# Patient Record
Sex: FEMALE | Race: BLACK OR AFRICAN AMERICAN | Employment: FULL TIME | URBAN - METROPOLITAN AREA
[De-identification: names, ages, dates, MRNs, and addresses within clinical notes are randomized per-mention and may not be internally consistent; named-entity substitution may affect disease eponyms.]

---

## 2017-03-21 ENCOUNTER — OFFICE VISIT (OUTPATIENT)
Dept: FAMILY MEDICINE CLINIC | Age: 22
End: 2017-03-21
Payer: MEDICARE

## 2017-03-21 VITALS
HEART RATE: 69 BPM | TEMPERATURE: 97.4 F | RESPIRATION RATE: 20 BRPM | HEIGHT: 62 IN | WEIGHT: 177.25 LBS | BODY MASS INDEX: 32.62 KG/M2

## 2017-03-21 DIAGNOSIS — E66.9 NON MORBID OBESITY, UNSPECIFIED OBESITY TYPE: ICD-10-CM

## 2017-03-21 DIAGNOSIS — F41.9 ANXIETY: ICD-10-CM

## 2017-03-21 DIAGNOSIS — G40.909 SEIZURE DISORDER (HCC): Primary | ICD-10-CM

## 2017-03-21 PROCEDURE — 99213 OFFICE O/P EST LOW 20 MIN: CPT | Performed by: HOSPITALIST

## 2017-03-21 RX ORDER — SERTRALINE HYDROCHLORIDE 25 MG/1
25 TABLET, FILM COATED ORAL DAILY
Qty: 30 TABLET | Refills: 3 | Status: SHIPPED | OUTPATIENT
Start: 2017-03-21 | End: 2017-05-22 | Stop reason: SDUPTHER

## 2017-03-21 RX ORDER — LEVETIRACETAM 500 MG/1
500 TABLET ORAL 2 TIMES DAILY
Qty: 60 TABLET | Refills: 2 | Status: SHIPPED | OUTPATIENT
Start: 2017-03-21 | End: 2017-05-22 | Stop reason: SDUPTHER

## 2017-03-21 ASSESSMENT — ENCOUNTER SYMPTOMS
APNEA: 0
ABDOMINAL DISTENTION: 0
CHEST TIGHTNESS: 0

## 2017-05-22 ENCOUNTER — OFFICE VISIT (OUTPATIENT)
Dept: FAMILY MEDICINE CLINIC | Age: 22
End: 2017-05-22
Payer: MEDICARE

## 2017-05-22 VITALS
TEMPERATURE: 97.1 F | SYSTOLIC BLOOD PRESSURE: 116 MMHG | WEIGHT: 183 LBS | BODY MASS INDEX: 33.68 KG/M2 | HEART RATE: 77 BPM | DIASTOLIC BLOOD PRESSURE: 66 MMHG | HEIGHT: 62 IN

## 2017-05-22 DIAGNOSIS — F41.9 ANXIETY: ICD-10-CM

## 2017-05-22 DIAGNOSIS — G40.909 SEIZURE DISORDER (HCC): ICD-10-CM

## 2017-05-22 DIAGNOSIS — Z00.00 HEALTH CARE MAINTENANCE: Primary | ICD-10-CM

## 2017-05-22 DIAGNOSIS — B37.31 VAGINAL CANDIDIASIS: ICD-10-CM

## 2017-05-22 PROCEDURE — 99213 OFFICE O/P EST LOW 20 MIN: CPT | Performed by: FAMILY MEDICINE

## 2017-05-22 RX ORDER — SERTRALINE HYDROCHLORIDE 25 MG/1
25 TABLET, FILM COATED ORAL DAILY
Qty: 30 TABLET | Refills: 3 | Status: SHIPPED | OUTPATIENT
Start: 2017-05-22 | End: 2018-03-28 | Stop reason: SDUPTHER

## 2017-05-22 RX ORDER — FLUCONAZOLE 150 MG/1
150 TABLET ORAL DAILY
Qty: 2 TABLET | Refills: 0 | Status: SHIPPED | OUTPATIENT
Start: 2017-05-22 | End: 2017-10-02

## 2017-05-22 RX ORDER — LEVETIRACETAM 500 MG/1
500 TABLET ORAL 2 TIMES DAILY
Qty: 60 TABLET | Refills: 2 | Status: SHIPPED | OUTPATIENT
Start: 2017-05-22 | End: 2017-10-02 | Stop reason: SDUPTHER

## 2017-05-22 ASSESSMENT — ENCOUNTER SYMPTOMS
SHORTNESS OF BREATH: 0
GASTROINTESTINAL NEGATIVE: 1
COUGH: 0

## 2017-08-10 ENCOUNTER — OFFICE VISIT (OUTPATIENT)
Dept: FAMILY MEDICINE CLINIC | Age: 22
End: 2017-08-10
Payer: MEDICARE

## 2017-08-10 VITALS
HEART RATE: 66 BPM | DIASTOLIC BLOOD PRESSURE: 79 MMHG | WEIGHT: 180.2 LBS | HEIGHT: 62 IN | TEMPERATURE: 97.1 F | BODY MASS INDEX: 33.16 KG/M2 | SYSTOLIC BLOOD PRESSURE: 114 MMHG

## 2017-08-10 DIAGNOSIS — H92.02 OTALGIA, LEFT: Primary | ICD-10-CM

## 2017-08-10 PROBLEM — H92.09 OTALGIA: Status: ACTIVE | Noted: 2017-08-10

## 2017-08-10 PROCEDURE — 99213 OFFICE O/P EST LOW 20 MIN: CPT | Performed by: FAMILY MEDICINE

## 2017-08-10 RX ORDER — NEOMYCIN SULFATE, POLYMYXIN B SULFATE AND HYDROCORTISONE 10; 3.5; 1 MG/ML; MG/ML; [USP'U]/ML
SUSPENSION/ DROPS AURICULAR (OTIC)
COMMUNITY
Start: 2017-08-08 | End: 2017-08-18

## 2017-08-10 RX ORDER — AMOXICILLIN AND CLAVULANATE POTASSIUM 875; 125 MG/1; MG/1
1 TABLET, FILM COATED ORAL 2 TIMES DAILY
Qty: 20 TABLET | Refills: 0 | Status: SHIPPED | OUTPATIENT
Start: 2017-08-10 | End: 2017-08-20

## 2017-08-10 ASSESSMENT — ENCOUNTER SYMPTOMS
SINUS PRESSURE: 0
SHORTNESS OF BREATH: 0
CHOKING: 0
COUGH: 0
RHINORRHEA: 0
GASTROINTESTINAL NEGATIVE: 1
EYES NEGATIVE: 1

## 2017-08-17 ENCOUNTER — HOSPITAL ENCOUNTER (OUTPATIENT)
Age: 22
Setting detail: SPECIMEN
Discharge: HOME | End: 2017-08-17
Payer: MEDICARE

## 2017-08-17 ENCOUNTER — OFFICE VISIT (OUTPATIENT)
Dept: FAMILY MEDICINE CLINIC | Age: 22
End: 2017-08-17
Payer: MEDICARE

## 2017-08-17 VITALS
HEART RATE: 76 BPM | BODY MASS INDEX: 33.31 KG/M2 | TEMPERATURE: 98.1 F | WEIGHT: 181 LBS | DIASTOLIC BLOOD PRESSURE: 77 MMHG | HEIGHT: 62 IN | SYSTOLIC BLOOD PRESSURE: 109 MMHG

## 2017-08-17 DIAGNOSIS — Z00.00 HEALTH CARE MAINTENANCE: ICD-10-CM

## 2017-08-17 DIAGNOSIS — H66.002 ACUTE SUPPURATIVE OTITIS MEDIA OF LEFT EAR WITHOUT SPONTANEOUS RUPTURE OF TYMPANIC MEMBRANE, RECURRENCE NOT SPECIFIED: Primary | ICD-10-CM

## 2017-08-17 PROCEDURE — 99213 OFFICE O/P EST LOW 20 MIN: CPT | Performed by: FAMILY MEDICINE

## 2017-08-17 PROCEDURE — 90715 TDAP VACCINE 7 YRS/> IM: CPT | Performed by: FAMILY MEDICINE

## 2017-08-17 PROCEDURE — 90471 IMMUNIZATION ADMIN: CPT | Performed by: FAMILY MEDICINE

## 2017-08-17 ASSESSMENT — PATIENT HEALTH QUESTIONNAIRE - PHQ9
SUM OF ALL RESPONSES TO PHQ9 QUESTIONS 1 & 2: 0
SUM OF ALL RESPONSES TO PHQ QUESTIONS 1-9: 0
2. FEELING DOWN, DEPRESSED OR HOPELESS: 0
1. LITTLE INTEREST OR PLEASURE IN DOING THINGS: 0

## 2017-08-17 ASSESSMENT — ENCOUNTER SYMPTOMS
GASTROINTESTINAL NEGATIVE: 1
COUGH: 0
SHORTNESS OF BREATH: 0
WHEEZING: 0

## 2017-08-18 LAB — C. TRACHOMATIS DNA ,URINE: NEGATIVE

## 2017-10-02 ENCOUNTER — OFFICE VISIT (OUTPATIENT)
Dept: FAMILY MEDICINE CLINIC | Age: 22
End: 2017-10-02
Payer: MEDICARE

## 2017-10-02 VITALS
DIASTOLIC BLOOD PRESSURE: 72 MMHG | BODY MASS INDEX: 32.57 KG/M2 | WEIGHT: 177 LBS | TEMPERATURE: 98.4 F | HEIGHT: 62 IN | HEART RATE: 83 BPM | SYSTOLIC BLOOD PRESSURE: 115 MMHG

## 2017-10-02 DIAGNOSIS — G40.909 SEIZURE DISORDER (HCC): Primary | ICD-10-CM

## 2017-10-02 DIAGNOSIS — Z00.00 HEALTH CARE MAINTENANCE: ICD-10-CM

## 2017-10-02 PROCEDURE — 99213 OFFICE O/P EST LOW 20 MIN: CPT | Performed by: FAMILY MEDICINE

## 2017-10-02 RX ORDER — LEVETIRACETAM 500 MG/1
500 TABLET ORAL 2 TIMES DAILY
Qty: 60 TABLET | Refills: 2 | Status: SHIPPED | OUTPATIENT
Start: 2017-10-02 | End: 2017-12-14 | Stop reason: SDUPTHER

## 2017-10-02 ASSESSMENT — ENCOUNTER SYMPTOMS
CHEST TIGHTNESS: 0
APNEA: 0
CHOKING: 0
COUGH: 0

## 2017-10-02 NOTE — MR AVS SNAPSHOT
cancers. BMI is not perfect. It may overestimate body fat in athletes and people who are more muscular. Even a small weight loss (between 5 and 10 percent of your current weight) by decreasing your calorie intake and becoming more physically active will help lower your risk of developing or worsening diseases associated with obesity. Learn more at: [employer]          Instructions    Thank you for letting us take care of you today. We hope all your questions were addressed. If a question was overlooked or something else comes to mind after you return home, please contact a member of your Care Team listed below. Please make sure you have a routine office visit set up to follow-up on [address]. Your Care Team at [place] [address] is Team #2  Erna Light DO (Faculty)  Mike Haque MD (Resindent)  Sonny Shields MD (Resident)  Mirian Pradhan MD (Resident)  Nathan Chowdhury MD (Resident)  Harley Granda MD (Resident)  Chas Morse TERRELL  [facility] DAVID Escobedo, DAVID Matthews ([facility])  Alan Henriquez RN, ([address] [facility])  Kale Smith, Ph.D., (Behavioral Services)  Kofi Prince, [address] [place] [address] (Clinical Pharmacist)     Office phone number: [phone]    If you need to get in right away due to illness, please be advised we have \"Same Day\" appointments available Monday-Friday. Please call us at [phone] option #1 to schedule your \"Same Day\" appointment. Today's Medication Changes          These changes are accurate as of: 10/2/17  3:06 PM.  If you have any questions, ask your nurse or doctor.                STOP taking these medications           fluconazole 150 MG tablet   Commonly known as:  DIFLUCAN   Stopped by:  Goldy Stapleton MD            Where to Get Your Medications      These medications were sent to [address] [place] [address] - [address] [facility], [address] [place] [address]     Phone:  [phone]     levETIRAcetam 500 MG tablet               Your Current Medications Are              levETIRAcetam (KEPPRA) 500 MG tablet Take 1 tablet by mouth 2 times daily    sertraline (ZOLOFT) 25 MG tablet Take 1 tablet by mouth daily    folic acid (V-R FOLIC ACID) 511 MCG tablet Take 1 tablet by mouth daily    Prenatal Vit-Fe Fumarate-FA (PRENATAL VITAMINS PLUS) 27-1 MG TABS tablet Take 1 tablet by mouth daily      Allergies           No Known Allergies         Additional Information        Basic Information     Date Of Birth Sex Race Ethnicity Preferred Language    1995 Female Black Non-/Non  English      Problem List as of 10/2/2017  Date Reviewed: 3/21/2017                Acute suppurative otitis media of left ear without spontaneous rupture of tympanic membrane    Health care maintenance    Otalgia    Encounter to establish care    Seizure disorder ([facility] [address]    Trying to get pregnant      Immunizations as of 10/2/2017     Name Date    Tdap (Boostrix, Adacel) 8/17/2017      Preventive Care        Date Due    HIV screening is recommended for all people regardless of risk factors  aged 15-65 years at least once (lifetime) who have never been HIV tested. 2/11/2010    Pap Smear 2/11/2016    Yearly Flu Vaccine (1) 10/16/2017 (Originally 9/1/2017)    Tetanus Combination Vaccine (2 - Td) 8/17/2027            MyChart Signup           Our records indicate that you have declined MyChart signup.

## 2017-12-14 ENCOUNTER — HOSPITAL ENCOUNTER (OUTPATIENT)
Age: 22
Discharge: HOME | End: 2017-12-14
Payer: MEDICARE

## 2017-12-14 ENCOUNTER — OFFICE VISIT (OUTPATIENT)
Dept: FAMILY MEDICINE CLINIC | Age: 22
End: 2017-12-14
Payer: MEDICARE

## 2017-12-14 ENCOUNTER — HOSPITAL ENCOUNTER (OUTPATIENT)
Dept: GENERAL RADIOLOGY | Age: 22
Discharge: HOME | End: 2017-12-14
Payer: MEDICARE

## 2017-12-14 VITALS
BODY MASS INDEX: 31.98 KG/M2 | DIASTOLIC BLOOD PRESSURE: 64 MMHG | TEMPERATURE: 97.6 F | WEIGHT: 173.8 LBS | SYSTOLIC BLOOD PRESSURE: 102 MMHG | HEIGHT: 62 IN | HEART RATE: 78 BPM

## 2017-12-14 DIAGNOSIS — Z00.00 HEALTH CARE MAINTENANCE: ICD-10-CM

## 2017-12-14 DIAGNOSIS — M25.571 ACUTE RIGHT ANKLE PAIN: ICD-10-CM

## 2017-12-14 DIAGNOSIS — M25.571 ACUTE RIGHT ANKLE PAIN: Primary | ICD-10-CM

## 2017-12-14 PROCEDURE — G8417 CALC BMI ABV UP PARAM F/U: HCPCS | Performed by: FAMILY MEDICINE

## 2017-12-14 PROCEDURE — 99213 OFFICE O/P EST LOW 20 MIN: CPT | Performed by: FAMILY MEDICINE

## 2017-12-14 PROCEDURE — 73600 X-RAY EXAM OF ANKLE: CPT

## 2017-12-14 PROCEDURE — G8427 DOCREV CUR MEDS BY ELIG CLIN: HCPCS | Performed by: FAMILY MEDICINE

## 2017-12-14 PROCEDURE — 1036F TOBACCO NON-USER: CPT | Performed by: FAMILY MEDICINE

## 2017-12-14 PROCEDURE — G8484 FLU IMMUNIZE NO ADMIN: HCPCS | Performed by: FAMILY MEDICINE

## 2017-12-14 RX ORDER — IBUPROFEN 800 MG/1
800 TABLET ORAL EVERY 6 HOURS PRN
Qty: 120 TABLET | Refills: 0 | Status: SHIPPED | OUTPATIENT
Start: 2017-12-14 | End: 2018-10-23

## 2017-12-14 RX ORDER — LEVETIRACETAM 500 MG/1
500 TABLET ORAL 2 TIMES DAILY
Qty: 60 TABLET | Refills: 2 | Status: SHIPPED | OUTPATIENT
Start: 2017-12-14 | End: 2018-03-28 | Stop reason: SDUPTHER

## 2017-12-14 ASSESSMENT — ENCOUNTER SYMPTOMS
GASTROINTESTINAL NEGATIVE: 1
SHORTNESS OF BREATH: 0
COUGH: 0

## 2017-12-14 NOTE — PROGRESS NOTES
Subjective:      Patient ID: Marie Suazo is a 25 y.o. female. HPI  Patient presents today with complain of right ankle pain   She fell 3 weeks ago down the stairs and hurt her right ankle  Reports swelling right after the injury  Difficult to ambulate  Is not taking anything for the pain  Patient states she put ice on it but swelling continued   Denies smoking or any other complains    Review of Systems   Constitutional: Negative for fatigue and fever. Respiratory: Negative for cough and shortness of breath. Cardiovascular: Negative. Gastrointestinal: Negative. Endocrine: Negative. Musculoskeletal: Positive for arthralgias. Objective:   Physical Exam   Constitutional: She appears well-developed. No distress. Cardiovascular: Normal rate and regular rhythm. Exam reveals no friction rub. No murmur heard. Pulmonary/Chest: Effort normal and breath sounds normal. No respiratory distress. She has no wheezes. Abdominal: Soft. Bowel sounds are normal.   Musculoskeletal:   Swelling and tenderness around the right lateral malleolus   Skin: She is not diaphoretic. Nursing note and vitals reviewed. Assessment and plan:       1. Acute right ankle pain  - XR ANKLE RIGHT (2 VIEWS); Future  - ibuprofen (ADVIL;MOTRIN) 800 MG tablet; Take 1 tablet by mouth every 6 hours as needed for Pain  Dispense: 120 tablet; Refill: 0    2. Health care maintenance  - HIV-1 And HIV-2 Antibodies; Future          Livia received counseling on the following healthy behaviors: nutrition, exercise and medication adherence    Patient given educational materials : see patient instruction        Discussed use, benefit, and side effects of prescribed medications. Barriers to medication compliance addressed. All patient questions answered. Pt voiced understanding. Return in about 4 weeks (around 1/11/2018) for Pap smear.

## 2018-03-28 ENCOUNTER — OFFICE VISIT (OUTPATIENT)
Dept: FAMILY MEDICINE CLINIC | Age: 23
End: 2018-03-28
Payer: MEDICARE

## 2018-03-28 VITALS
DIASTOLIC BLOOD PRESSURE: 75 MMHG | HEIGHT: 62 IN | HEART RATE: 67 BPM | WEIGHT: 166.8 LBS | SYSTOLIC BLOOD PRESSURE: 118 MMHG | TEMPERATURE: 96.7 F | BODY MASS INDEX: 30.69 KG/M2

## 2018-03-28 DIAGNOSIS — G40.909 SEIZURE DISORDER (HCC): Primary | ICD-10-CM

## 2018-03-28 DIAGNOSIS — F41.9 ANXIETY: ICD-10-CM

## 2018-03-28 DIAGNOSIS — Z00.00 HEALTH CARE MAINTENANCE: ICD-10-CM

## 2018-03-28 PROCEDURE — G8484 FLU IMMUNIZE NO ADMIN: HCPCS | Performed by: FAMILY MEDICINE

## 2018-03-28 PROCEDURE — 99213 OFFICE O/P EST LOW 20 MIN: CPT | Performed by: FAMILY MEDICINE

## 2018-03-28 PROCEDURE — G8427 DOCREV CUR MEDS BY ELIG CLIN: HCPCS | Performed by: FAMILY MEDICINE

## 2018-03-28 PROCEDURE — G8417 CALC BMI ABV UP PARAM F/U: HCPCS | Performed by: FAMILY MEDICINE

## 2018-03-28 PROCEDURE — 4004F PT TOBACCO SCREEN RCVD TLK: CPT | Performed by: FAMILY MEDICINE

## 2018-03-28 RX ORDER — LEVETIRACETAM 500 MG/1
500 TABLET ORAL 2 TIMES DAILY
Qty: 60 TABLET | Refills: 0 | Status: SHIPPED | OUTPATIENT
Start: 2018-03-28 | End: 2018-06-21 | Stop reason: SDUPTHER

## 2018-03-28 RX ORDER — SERTRALINE HYDROCHLORIDE 25 MG/1
25 TABLET, FILM COATED ORAL DAILY
Qty: 30 TABLET | Refills: 3 | Status: SHIPPED | OUTPATIENT
Start: 2018-03-28 | End: 2019-07-12 | Stop reason: SDUPTHER

## 2018-03-28 ASSESSMENT — ENCOUNTER SYMPTOMS
WHEEZING: 0
SHORTNESS OF BREATH: 0
COUGH: 0
GASTROINTESTINAL NEGATIVE: 1

## 2018-04-26 ENCOUNTER — OFFICE VISIT (OUTPATIENT)
Dept: FAMILY MEDICINE CLINIC | Age: 23
End: 2018-04-26
Payer: MEDICARE

## 2018-04-26 VITALS — SYSTOLIC BLOOD PRESSURE: 120 MMHG | DIASTOLIC BLOOD PRESSURE: 74 MMHG | HEIGHT: 62 IN

## 2018-04-26 DIAGNOSIS — J03.01 RECURRENT STREPTOCOCCAL TONSILLITIS: ICD-10-CM

## 2018-04-26 DIAGNOSIS — J02.0 ACUTE STREPTOCOCCAL PHARYNGITIS: Primary | ICD-10-CM

## 2018-04-26 LAB — S PYO AG THROAT QL: POSITIVE

## 2018-04-26 PROCEDURE — G8427 DOCREV CUR MEDS BY ELIG CLIN: HCPCS | Performed by: FAMILY MEDICINE

## 2018-04-26 PROCEDURE — 87880 STREP A ASSAY W/OPTIC: CPT | Performed by: FAMILY MEDICINE

## 2018-04-26 PROCEDURE — 99213 OFFICE O/P EST LOW 20 MIN: CPT | Performed by: FAMILY MEDICINE

## 2018-04-26 PROCEDURE — G8417 CALC BMI ABV UP PARAM F/U: HCPCS | Performed by: FAMILY MEDICINE

## 2018-04-26 PROCEDURE — 4004F PT TOBACCO SCREEN RCVD TLK: CPT | Performed by: FAMILY MEDICINE

## 2018-04-26 RX ORDER — PENICILLIN V POTASSIUM 500 MG/1
500 TABLET ORAL 3 TIMES DAILY
Qty: 30 TABLET | Refills: 0 | Status: SHIPPED | OUTPATIENT
Start: 2018-04-26 | End: 2018-05-06

## 2018-04-26 ASSESSMENT — ENCOUNTER SYMPTOMS
SHORTNESS OF BREATH: 0
WHEEZING: 0
RHINORRHEA: 0
SORE THROAT: 1
SINUS PRESSURE: 0

## 2018-04-28 ENCOUNTER — HOSPITAL ENCOUNTER (EMERGENCY)
Age: 23
Discharge: HOME | End: 2018-04-28
Attending: EMERGENCY MEDICINE
Payer: MEDICARE

## 2018-04-28 VITALS
DIASTOLIC BLOOD PRESSURE: 78 MMHG | SYSTOLIC BLOOD PRESSURE: 120 MMHG | OXYGEN SATURATION: 98 % | HEART RATE: 84 BPM | TEMPERATURE: 98.1 F | RESPIRATION RATE: 16 BRPM

## 2018-04-28 DIAGNOSIS — B96.89 BACTERIAL VAGINOSIS: ICD-10-CM

## 2018-04-28 DIAGNOSIS — N76.0 BACTERIAL VAGINOSIS: ICD-10-CM

## 2018-04-28 DIAGNOSIS — B37.9 YEAST INFECTION: Primary | ICD-10-CM

## 2018-04-28 LAB
-: ABNORMAL
AMORPHOUS: ABNORMAL
BACTERIA: ABNORMAL
BILIRUBIN URINE: NEGATIVE
CASTS UA: ABNORMAL /LPF (ref 0–2)
COLOR: ABNORMAL
COMMENT UA: ABNORMAL
CRYSTALS, UA: ABNORMAL /HPF
DIRECT EXAM: ABNORMAL
EPITHELIAL CELLS UA: ABNORMAL /HPF (ref 0–5)
GLUCOSE URINE: NEGATIVE
HCG(URINE) PREGNANCY TEST: NEGATIVE
KETONES, URINE: ABNORMAL
LEUKOCYTE ESTERASE, URINE: ABNORMAL
Lab: ABNORMAL
MUCUS: ABNORMAL
NITRITE, URINE: NEGATIVE
OTHER OBSERVATIONS UA: ABNORMAL
PH UA: 5.5 (ref 5–8)
PROTEIN UA: ABNORMAL
RBC UA: ABNORMAL /HPF (ref 0–2)
RENAL EPITHELIAL, UA: ABNORMAL /HPF
SPECIFIC GRAVITY UA: 1.03 (ref 1–1.03)
SPECIMEN DESCRIPTION: ABNORMAL
STATUS: ABNORMAL
TRICHOMONAS: ABNORMAL
TURBIDITY: ABNORMAL
URINE HGB: ABNORMAL
UROBILINOGEN, URINE: NORMAL
WBC UA: ABNORMAL /HPF (ref 0–5)
YEAST: ABNORMAL

## 2018-04-28 PROCEDURE — 87510 GARDNER VAG DNA DIR PROBE: CPT

## 2018-04-28 PROCEDURE — 6370000000 HC RX 637 (ALT 250 FOR IP): Performed by: EMERGENCY MEDICINE

## 2018-04-28 PROCEDURE — 87491 CHLMYD TRACH DNA AMP PROBE: CPT

## 2018-04-28 PROCEDURE — 87186 SC STD MICRODIL/AGAR DIL: CPT

## 2018-04-28 PROCEDURE — 87591 N.GONORRHOEAE DNA AMP PROB: CPT

## 2018-04-28 PROCEDURE — 87088 URINE BACTERIA CULTURE: CPT

## 2018-04-28 PROCEDURE — 87480 CANDIDA DNA DIR PROBE: CPT

## 2018-04-28 PROCEDURE — 84703 CHORIONIC GONADOTROPIN ASSAY: CPT

## 2018-04-28 PROCEDURE — 99283 EMERGENCY DEPT VISIT LOW MDM: CPT

## 2018-04-28 PROCEDURE — 87086 URINE CULTURE/COLONY COUNT: CPT

## 2018-04-28 PROCEDURE — 87660 TRICHOMONAS VAGIN DIR PROBE: CPT

## 2018-04-28 PROCEDURE — 81001 URINALYSIS AUTO W/SCOPE: CPT

## 2018-04-28 RX ORDER — FLUCONAZOLE 50 MG/1
150 TABLET ORAL ONCE
Status: COMPLETED | OUTPATIENT
Start: 2018-04-28 | End: 2018-04-28

## 2018-04-28 RX ORDER — METRONIDAZOLE 500 MG/1
500 TABLET ORAL 2 TIMES DAILY
Qty: 14 TABLET | Refills: 0 | Status: SHIPPED | OUTPATIENT
Start: 2018-04-28 | End: 2018-05-05

## 2018-04-28 RX ORDER — METRONIDAZOLE 500 MG/1
500 TABLET ORAL ONCE
Status: COMPLETED | OUTPATIENT
Start: 2018-04-28 | End: 2018-04-28

## 2018-04-28 RX ADMIN — METRONIDAZOLE 500 MG: 500 TABLET ORAL at 11:49

## 2018-04-28 RX ADMIN — FLUCONAZOLE 150 MG: 50 TABLET ORAL at 11:49

## 2018-04-28 ASSESSMENT — ENCOUNTER SYMPTOMS
ABDOMINAL PAIN: 0
SHORTNESS OF BREATH: 0
RHINORRHEA: 0

## 2018-04-29 LAB
CULTURE: ABNORMAL
CULTURE: ABNORMAL
Lab: ABNORMAL
ORGANISM: ABNORMAL
SPECIMEN DESCRIPTION: ABNORMAL
STATUS: ABNORMAL

## 2018-04-30 LAB
C TRACH DNA GENITAL QL NAA+PROBE: NEGATIVE
N. GONORRHOEAE DNA: NEGATIVE

## 2018-05-26 ENCOUNTER — HOSPITAL ENCOUNTER (EMERGENCY)
Age: 23
Discharge: HOME | End: 2018-05-26
Attending: EMERGENCY MEDICINE
Payer: MEDICARE

## 2018-05-26 VITALS
OXYGEN SATURATION: 100 % | RESPIRATION RATE: 12 BRPM | HEART RATE: 93 BPM | DIASTOLIC BLOOD PRESSURE: 81 MMHG | SYSTOLIC BLOOD PRESSURE: 127 MMHG | WEIGHT: 174 LBS | BODY MASS INDEX: 32.02 KG/M2 | TEMPERATURE: 98.2 F | HEIGHT: 62 IN

## 2018-05-26 DIAGNOSIS — Z20.2 STD EXPOSURE: Primary | ICD-10-CM

## 2018-05-26 DIAGNOSIS — N30.00 ACUTE CYSTITIS WITHOUT HEMATURIA: ICD-10-CM

## 2018-05-26 LAB
-: ABNORMAL
AMORPHOUS: ABNORMAL
BACTERIA: ABNORMAL
BILIRUBIN URINE: NEGATIVE
CASTS UA: ABNORMAL /LPF (ref 0–2)
COLOR: ABNORMAL
CRYSTALS, UA: ABNORMAL /HPF
DIRECT EXAM: NORMAL
EPITHELIAL CELLS UA: ABNORMAL /HPF (ref 0–5)
GLUCOSE URINE: NEGATIVE
HCG(URINE) PREGNANCY TEST: NEGATIVE
KETONES, URINE: ABNORMAL
LEUKOCYTE ESTERASE, URINE: ABNORMAL
Lab: NORMAL
MUCUS: ABNORMAL
NITRITE, URINE: NEGATIVE
OTHER OBSERVATIONS UA: ABNORMAL
PH UA: 6.5 (ref 5–8)
PROTEIN UA: ABNORMAL
RBC UA: ABNORMAL /HPF (ref 0–2)
RENAL EPITHELIAL, UA: ABNORMAL /HPF
SPECIFIC GRAVITY UA: 1.03 (ref 1–1.03)
SPECIMEN DESCRIPTION: NORMAL
STATUS: NORMAL
TRICHOMONAS: ABNORMAL
TURBIDITY: ABNORMAL
URINE HGB: NEGATIVE
UROBILINOGEN, URINE: NORMAL
WBC UA: ABNORMAL /HPF (ref 0–5)
YEAST: ABNORMAL

## 2018-05-26 PROCEDURE — 81001 URINALYSIS AUTO W/SCOPE: CPT

## 2018-05-26 PROCEDURE — 87591 N.GONORRHOEAE DNA AMP PROB: CPT

## 2018-05-26 PROCEDURE — 87480 CANDIDA DNA DIR PROBE: CPT

## 2018-05-26 PROCEDURE — 87660 TRICHOMONAS VAGIN DIR PROBE: CPT

## 2018-05-26 PROCEDURE — 6370000000 HC RX 637 (ALT 250 FOR IP): Performed by: EMERGENCY MEDICINE

## 2018-05-26 PROCEDURE — 99283 EMERGENCY DEPT VISIT LOW MDM: CPT

## 2018-05-26 PROCEDURE — 84703 CHORIONIC GONADOTROPIN ASSAY: CPT

## 2018-05-26 PROCEDURE — 87510 GARDNER VAG DNA DIR PROBE: CPT

## 2018-05-26 PROCEDURE — 87491 CHLMYD TRACH DNA AMP PROBE: CPT

## 2018-05-26 RX ORDER — CEFTRIAXONE SODIUM 250 MG/1
250 INJECTION, POWDER, FOR SOLUTION INTRAMUSCULAR; INTRAVENOUS ONCE
Status: DISCONTINUED | OUTPATIENT
Start: 2018-05-26 | End: 2018-05-26 | Stop reason: HOSPADM

## 2018-05-26 RX ORDER — AZITHROMYCIN 250 MG/1
1000 TABLET, FILM COATED ORAL ONCE
Status: COMPLETED | OUTPATIENT
Start: 2018-05-26 | End: 2018-05-26

## 2018-05-26 RX ORDER — CEPHALEXIN 500 MG/1
500 CAPSULE ORAL ONCE
Status: COMPLETED | OUTPATIENT
Start: 2018-05-26 | End: 2018-05-26

## 2018-05-26 RX ORDER — CEPHALEXIN 500 MG/1
500 CAPSULE ORAL 2 TIMES DAILY
Qty: 14 CAPSULE | Refills: 0 | Status: SHIPPED | OUTPATIENT
Start: 2018-05-26 | End: 2018-06-02

## 2018-05-26 RX ADMIN — CEPHALEXIN 500 MG: 500 CAPSULE ORAL at 07:10

## 2018-05-26 RX ADMIN — AZITHROMYCIN 1000 MG: 250 TABLET, FILM COATED ORAL at 07:10

## 2018-05-26 ASSESSMENT — ENCOUNTER SYMPTOMS
VOMITING: 0
DIARRHEA: 0
NAUSEA: 0
COUGH: 0
STRIDOR: 0
CHEST TIGHTNESS: 0
ABDOMINAL DISTENTION: 0
SHORTNESS OF BREATH: 0
BLOOD IN STOOL: 0
ABDOMINAL PAIN: 0
WHEEZING: 0
SORE THROAT: 0

## 2018-05-29 LAB
C TRACH DNA GENITAL QL NAA+PROBE: NEGATIVE
N. GONORRHOEAE DNA: ABNORMAL

## 2018-06-22 RX ORDER — LEVETIRACETAM 500 MG/1
TABLET ORAL
Qty: 60 TABLET | Refills: 0 | Status: SHIPPED | OUTPATIENT
Start: 2018-06-22 | End: 2018-06-27 | Stop reason: SDUPTHER

## 2018-06-27 ENCOUNTER — OFFICE VISIT (OUTPATIENT)
Dept: FAMILY MEDICINE CLINIC | Age: 23
End: 2018-06-27
Payer: MEDICARE

## 2018-06-27 VITALS
SYSTOLIC BLOOD PRESSURE: 130 MMHG | BODY MASS INDEX: 29.3 KG/M2 | WEIGHT: 159.2 LBS | HEART RATE: 99 BPM | TEMPERATURE: 97.9 F | HEIGHT: 62 IN | DIASTOLIC BLOOD PRESSURE: 76 MMHG

## 2018-06-27 DIAGNOSIS — A54.9 GONORRHEA: Primary | ICD-10-CM

## 2018-06-27 PROCEDURE — G8427 DOCREV CUR MEDS BY ELIG CLIN: HCPCS | Performed by: FAMILY MEDICINE

## 2018-06-27 PROCEDURE — 4004F PT TOBACCO SCREEN RCVD TLK: CPT | Performed by: FAMILY MEDICINE

## 2018-06-27 PROCEDURE — 99213 OFFICE O/P EST LOW 20 MIN: CPT | Performed by: FAMILY MEDICINE

## 2018-06-27 PROCEDURE — G8417 CALC BMI ABV UP PARAM F/U: HCPCS | Performed by: FAMILY MEDICINE

## 2018-06-27 RX ORDER — CEFTRIAXONE SODIUM 250 MG/1
250 INJECTION, POWDER, FOR SOLUTION INTRAMUSCULAR; INTRAVENOUS ONCE
Status: COMPLETED | OUTPATIENT
Start: 2018-06-27 | End: 2018-06-27

## 2018-06-27 RX ORDER — AZITHROMYCIN 250 MG/1
1000 TABLET, FILM COATED ORAL ONCE
Status: COMPLETED | OUTPATIENT
Start: 2018-06-27 | End: 2018-06-27

## 2018-06-27 RX ORDER — ALBUTEROL SULFATE 90 UG/1
2 AEROSOL, METERED RESPIRATORY (INHALATION) EVERY 6 HOURS PRN
Qty: 1 INHALER | Refills: 3 | Status: SHIPPED | OUTPATIENT
Start: 2018-06-27 | End: 2018-10-24 | Stop reason: SDUPTHER

## 2018-06-27 RX ORDER — LEVETIRACETAM 500 MG/1
TABLET ORAL
Qty: 60 TABLET | Refills: 0 | Status: SHIPPED | OUTPATIENT
Start: 2018-06-27 | End: 2018-10-24 | Stop reason: SDUPTHER

## 2018-06-27 RX ADMIN — AZITHROMYCIN 1000 MG: 250 TABLET, FILM COATED ORAL at 16:28

## 2018-06-27 RX ADMIN — CEFTRIAXONE SODIUM 250 MG: 250 INJECTION, POWDER, FOR SOLUTION INTRAMUSCULAR; INTRAVENOUS at 16:31

## 2018-06-27 ASSESSMENT — ENCOUNTER SYMPTOMS
COUGH: 0
GASTROINTESTINAL NEGATIVE: 1
SHORTNESS OF BREATH: 0

## 2018-07-18 ENCOUNTER — HOSPITAL ENCOUNTER (EMERGENCY)
Age: 23
Discharge: HOME | End: 2018-07-18
Attending: EMERGENCY MEDICINE
Payer: MEDICARE

## 2018-07-18 VITALS
OXYGEN SATURATION: 100 % | SYSTOLIC BLOOD PRESSURE: 112 MMHG | WEIGHT: 150 LBS | BODY MASS INDEX: 27.6 KG/M2 | RESPIRATION RATE: 16 BRPM | HEART RATE: 79 BPM | HEIGHT: 62 IN | TEMPERATURE: 98.4 F | DIASTOLIC BLOOD PRESSURE: 62 MMHG

## 2018-07-18 DIAGNOSIS — G40.919 BREAKTHROUGH SEIZURE (HCC): Primary | ICD-10-CM

## 2018-07-18 LAB
-: ABNORMAL
ABSOLUTE EOS #: 0.12 K/UL (ref 0–0.44)
ABSOLUTE IMMATURE GRANULOCYTE: 0.04 K/UL (ref 0–0.3)
ABSOLUTE LYMPH #: 2.95 K/UL (ref 1.1–3.7)
ABSOLUTE MONO #: 0.72 K/UL (ref 0.1–1.2)
AMORPHOUS: ABNORMAL
ANION GAP SERPL CALCULATED.3IONS-SCNC: 17 MMOL/L (ref 9–17)
BACTERIA: ABNORMAL
BASOPHILS # BLD: 1 % (ref 0–2)
BASOPHILS ABSOLUTE: 0.05 K/UL (ref 0–0.2)
BILIRUBIN URINE: NEGATIVE
BUN BLDV-MCNC: 10 MG/DL (ref 6–20)
BUN/CREAT BLD: ABNORMAL (ref 9–20)
CALCIUM SERPL-MCNC: 8.6 MG/DL (ref 8.6–10.4)
CASTS UA: ABNORMAL /LPF (ref 0–8)
CHLORIDE BLD-SCNC: 103 MMOL/L (ref 98–107)
CO2: 22 MMOL/L (ref 20–31)
COLOR: YELLOW
COMMENT UA: ABNORMAL
CREAT SERPL-MCNC: 1.02 MG/DL (ref 0.5–0.9)
CRYSTALS, UA: ABNORMAL /HPF
DIFFERENTIAL TYPE: NORMAL
EOSINOPHILS RELATIVE PERCENT: 1 % (ref 1–4)
EPITHELIAL CELLS UA: ABNORMAL /HPF (ref 0–5)
GFR AFRICAN AMERICAN: >60 ML/MIN
GFR NON-AFRICAN AMERICAN: >60 ML/MIN
GFR SERPL CREATININE-BSD FRML MDRD: ABNORMAL ML/MIN/{1.73_M2}
GFR SERPL CREATININE-BSD FRML MDRD: ABNORMAL ML/MIN/{1.73_M2}
GLUCOSE BLD-MCNC: 100 MG/DL (ref 70–99)
GLUCOSE URINE: NEGATIVE
HCG QUALITATIVE: NEGATIVE
HCT VFR BLD CALC: 44.1 % (ref 36.3–47.1)
HEMOGLOBIN: 13.9 G/DL (ref 11.9–15.1)
IMMATURE GRANULOCYTES: 0 %
KEPPRA: <2 UG/ML
KETONES, URINE: ABNORMAL
LEUKOCYTE ESTERASE, URINE: NEGATIVE
LYMPHOCYTES # BLD: 27 % (ref 24–43)
MAGNESIUM: 1.8 MG/DL (ref 1.6–2.6)
MCH RBC QN AUTO: 30.8 PG (ref 25.2–33.5)
MCHC RBC AUTO-ENTMCNC: 31.5 G/DL (ref 28.4–34.8)
MCV RBC AUTO: 97.6 FL (ref 82.6–102.9)
MONOCYTES # BLD: 7 % (ref 3–12)
MUCUS: ABNORMAL
NITRITE, URINE: NEGATIVE
NRBC AUTOMATED: 0 PER 100 WBC
OTHER OBSERVATIONS UA: ABNORMAL
PDW BLD-RTO: 12.6 % (ref 11.8–14.4)
PH UA: 6 (ref 5–8)
PLATELET # BLD: 253 K/UL (ref 138–453)
PLATELET ESTIMATE: NORMAL
PMV BLD AUTO: 10.6 FL (ref 8.1–13.5)
POTASSIUM SERPL-SCNC: 3.9 MMOL/L (ref 3.7–5.3)
PROTEIN UA: ABNORMAL
RBC # BLD: 4.52 M/UL (ref 3.95–5.11)
RBC # BLD: NORMAL 10*6/UL
RBC UA: ABNORMAL /HPF (ref 0–4)
RENAL EPITHELIAL, UA: ABNORMAL /HPF
SEG NEUTROPHILS: 64 % (ref 36–65)
SEGMENTED NEUTROPHILS ABSOLUTE COUNT: 7.19 K/UL (ref 1.5–8.1)
SODIUM BLD-SCNC: 142 MMOL/L (ref 135–144)
SPECIFIC GRAVITY UA: 1.03 (ref 1–1.03)
TRICHOMONAS: ABNORMAL
TURBIDITY: CLEAR
URINE HGB: NEGATIVE
UROBILINOGEN, URINE: NORMAL
WBC # BLD: 11.1 K/UL (ref 3.5–11.3)
WBC # BLD: NORMAL 10*3/UL
WBC UA: ABNORMAL /HPF (ref 0–5)
YEAST: ABNORMAL

## 2018-07-18 PROCEDURE — 80177 DRUG SCRN QUAN LEVETIRACETAM: CPT

## 2018-07-18 PROCEDURE — 99284 EMERGENCY DEPT VISIT MOD MDM: CPT

## 2018-07-18 PROCEDURE — 81001 URINALYSIS AUTO W/SCOPE: CPT

## 2018-07-18 PROCEDURE — 80048 BASIC METABOLIC PNL TOTAL CA: CPT

## 2018-07-18 PROCEDURE — 96375 TX/PRO/DX INJ NEW DRUG ADDON: CPT

## 2018-07-18 PROCEDURE — 85025 COMPLETE CBC W/AUTO DIFF WBC: CPT

## 2018-07-18 PROCEDURE — 96365 THER/PROPH/DIAG IV INF INIT: CPT

## 2018-07-18 PROCEDURE — 83735 ASSAY OF MAGNESIUM: CPT

## 2018-07-18 PROCEDURE — 6360000002 HC RX W HCPCS: Performed by: EMERGENCY MEDICINE

## 2018-07-18 PROCEDURE — 94762 N-INVAS EAR/PLS OXIMTRY CONT: CPT

## 2018-07-18 PROCEDURE — 84703 CHORIONIC GONADOTROPIN ASSAY: CPT

## 2018-07-18 PROCEDURE — 87086 URINE CULTURE/COLONY COUNT: CPT

## 2018-07-18 RX ORDER — LEVETIRACETAM 10 MG/ML
1000 INJECTION INTRAVASCULAR ONCE
Status: COMPLETED | OUTPATIENT
Start: 2018-07-18 | End: 2018-07-18

## 2018-07-18 RX ORDER — ONDANSETRON 2 MG/ML
4 INJECTION INTRAMUSCULAR; INTRAVENOUS ONCE
Status: COMPLETED | OUTPATIENT
Start: 2018-07-18 | End: 2018-07-18

## 2018-07-18 RX ADMIN — ONDANSETRON 4 MG: 2 INJECTION INTRAMUSCULAR; INTRAVENOUS at 06:41

## 2018-07-18 RX ADMIN — LEVETIRACETAM 1000 MG: 10 INJECTION INTRAVENOUS at 07:41

## 2018-07-18 ASSESSMENT — ENCOUNTER SYMPTOMS
ABDOMINAL PAIN: 0
VOMITING: 1
EYE PAIN: 0
COUGH: 0
DIARRHEA: 0
NAUSEA: 1
SORE THROAT: 0
EYE DISCHARGE: 0
SHORTNESS OF BREATH: 0

## 2018-07-18 NOTE — ED NOTES
Bed: 15  Expected date: 7/18/18  Expected time: 6:23 AM  Means of arrival:   Comments:  Medic 3   Seizure history       Kimberlee Dempsey RN  07/18/18 0581

## 2018-07-18 NOTE — ED PROVIDER NOTES
known allergies. Home Medications:  Prior to Admission medications    Medication Sig Start Date End Date Taking? Authorizing Provider   levETIRAcetam (KEPPRA) 500 MG tablet take 1 tablet by mouth twice a day 6/27/18  Yes Yudy Rausch MD   albuterol sulfate HFA (VENTOLIN HFA) 108 (90 Base) MCG/ACT inhaler Inhale 2 puffs into the lungs every 6 hours as needed for Wheezing 6/27/18  Yes Yudy Rausch MD   sertraline (ZOLOFT) 25 MG tablet Take 1 tablet by mouth daily 3/28/18  Yes Yudy Rausch MD   ibuprofen (ADVIL;MOTRIN) 800 MG tablet Take 1 tablet by mouth every 6 hours as needed for Pain 12/14/17  Yes Yudy Rausch MD   folic acid (V-R FOLIC ACID) 261 MCG tablet Take 1 tablet by mouth daily 5/12/16  Yes Yudy Rausch MD   Prenatal Vit-Fe Fumarate-FA (PRENATAL VITAMINS PLUS) 27-1 MG TABS tablet Take 1 tablet by mouth daily 5/12/16   Yudy Rausch MD       REVIEW OF SYSTEMS    (2-9 systems for level 4, 10 or more for level 5)      Review of Systems   Constitutional: Negative for chills, diaphoresis and fever. HENT: Negative for congestion and sore throat. Eyes: Negative for pain and discharge. Respiratory: Negative for cough and shortness of breath. Cardiovascular: Negative for chest pain and leg swelling. Gastrointestinal: Positive for nausea and vomiting. Negative for abdominal pain and diarrhea. Endocrine: Negative for polydipsia and polyuria. Genitourinary: Negative for dysuria and hematuria. Musculoskeletal: Negative for neck pain and neck stiffness. Skin: Negative for pallor and rash. Allergic/Immunologic: Negative for environmental allergies and food allergies. Neurological: Positive for seizures. Negative for dizziness, weakness, numbness and headaches. Hematological: Negative for adenopathy. Does not bruise/bleed easily. Psychiatric/Behavioral: Negative for hallucinations and suicidal ideas.        PHYSICAL EXAM   (up to 7 for level 4, 8 or more for level 5)      INITIAL Result Value Ref Range    Magnesium 1.8 1.6 - 2.6 mg/dL   Levetiracetam Level   Result Value Ref Range    Levetiracetam Lvl <2 ug/mL   HCG Qualitative, Serum   Result Value Ref Range    hCG Qual NEGATIVE NEG   Urinalysis Reflex to Culture   Result Value Ref Range    Color, UA YELLOW YEL    Turbidity UA CLEAR CLEAR    Glucose, Ur NEGATIVE NEG    Bilirubin Urine NEGATIVE NEG    Ketones, Urine TRACE (A) NEG    Specific Gravity, UA 1.026 1.005 - 1.030    Urine Hgb NEGATIVE NEG    pH, UA 6.0 5.0 - 8.0    Protein, UA TRACE (A) NEG    Urobilinogen, Urine Normal NORM    Nitrite, Urine NEGATIVE NEG    Leukocyte Esterase, Urine NEGATIVE NEG    Urinalysis Comments NOT REPORTED    Microscopic Urinalysis   Result Value Ref Range    -          WBC, UA 5 TO 10 0 - 5 /HPF    RBC, UA 0 TO 2 0 - 4 /HPF    Casts UA 2 TO 5 HYALINE 0 - 8 /LPF    Crystals UA NOT REPORTED NONE /HPF    Epithelial Cells UA 10 TO 20 0 - 5 /HPF    Renal Epithelial, Urine NOT REPORTED 0 /HPF    Bacteria, UA FEW (A) NONE    Mucus, UA NOT REPORTED NONE    Trichomonas, UA NOT REPORTED NONE    Amorphous, UA NOT REPORTED NONE    Other Observations UA NOT REPORTED NREQ    Yeast, UA NOT REPORTED NONE       IMPRESSION: 66-year-old female with known seizure disorder presents following a seizure lasting approximately 1 minute, patient alert and oriented without any neurologic deficits, denies any symptoms currently. We'll check basic labs, urinalysis, pregnancy and continue to reassess. RADIOLOGY:  None    EKG  None    All EKG's are interpreted by the Emergency Department Physician who either signs or Co-signs this chart in the absence of a cardiologist.    EMERGENCY DEPARTMENT COURSE:  Pt signed out to Dr. Percy Soto, labs pending    PROCEDURES:  None    CONSULTS:  None    CRITICAL CARE:  None    FINAL IMPRESSION      1.  Breakthrough seizure ([address]          DISPOSITION / PLAN     DISPOSITION Decision To Discharge 07/18/2018 08:10:31 AM      PATIENT REFERRED TO:  Lorri Ochoa

## 2018-07-18 NOTE — ED NOTES
Pt to ED room 15 in wheelchair by EMS for c/o seizure this morning. Pt has hx of brain surgery for tumor in 2015 and has had seizures since. Pt was witnessed by friends having a tonic clonic seizure per EMS. Pt was post-ictal when EMS arrived to scene. Pt vomited in ambulance. Pt arrives alert and oriented X3, answering questions appropriately in complete sentences. Pt placed on full cardiac monitoring. IV established, labs drawn and labeled. Resident at bedside to assess. Seizure precautions initiated, side rails up X2 and padded. Call light provided. Waiting further orders.       Gabriel Cardozo RN  07/18/18 2844

## 2018-07-18 NOTE — ED NOTES
Bedside report given to [facility].       Lian Torres, RN  07/18/18 [address], RN  07/18/18 8731

## 2018-07-19 LAB
CULTURE: NORMAL
Lab: NORMAL
SPECIMEN DESCRIPTION: NORMAL
STATUS: NORMAL

## 2018-08-31 ENCOUNTER — HOSPITAL ENCOUNTER (OUTPATIENT)
Age: 23
Setting detail: SPECIMEN
Discharge: HOME | End: 2018-08-31
Payer: MEDICARE

## 2018-08-31 DIAGNOSIS — Z00.00 HEALTH CARE MAINTENANCE: ICD-10-CM

## 2018-08-31 LAB — HIV AG/AB: NONREACTIVE

## 2018-09-26 PROBLEM — Z00.00 HEALTH CARE MAINTENANCE: Status: RESOLVED | Noted: 2017-08-17 | Resolved: 2018-09-26

## 2018-10-23 ENCOUNTER — HOSPITAL ENCOUNTER (EMERGENCY)
Age: 23
Discharge: LEFT BEFORE DISCHARGE | End: 2018-10-23
Attending: EMERGENCY MEDICINE | Admitting: EMERGENCY MEDICINE
Payer: MEDICARE

## 2018-10-23 VITALS
RESPIRATION RATE: 20 BRPM | SYSTOLIC BLOOD PRESSURE: 127 MMHG | HEART RATE: 78 BPM | TEMPERATURE: 98 F | OXYGEN SATURATION: 100 % | DIASTOLIC BLOOD PRESSURE: 53 MMHG | WEIGHT: 150 LBS | BODY MASS INDEX: 27.44 KG/M2

## 2018-10-23 DIAGNOSIS — G40.919 BREAKTHROUGH SEIZURE (HCC): Primary | ICD-10-CM

## 2018-10-23 LAB
ABSOLUTE EOS #: 0.12 K/UL (ref 0–0.44)
ABSOLUTE IMMATURE GRANULOCYTE: <0.03 K/UL (ref 0–0.3)
ABSOLUTE LYMPH #: 2.07 K/UL (ref 1.1–3.7)
ABSOLUTE MONO #: 0.49 K/UL (ref 0.1–1.2)
ANION GAP SERPL CALCULATED.3IONS-SCNC: 13 MMOL/L (ref 9–17)
BASOPHILS # BLD: 1 % (ref 0–2)
BASOPHILS ABSOLUTE: 0.04 K/UL (ref 0–0.2)
BUN BLDV-MCNC: 8 MG/DL (ref 6–20)
BUN/CREAT BLD: ABNORMAL (ref 9–20)
CALCIUM SERPL-MCNC: 8.6 MG/DL (ref 8.6–10.4)
CHLORIDE BLD-SCNC: 103 MMOL/L (ref 98–107)
CO2: 22 MMOL/L (ref 20–31)
CREAT SERPL-MCNC: 0.97 MG/DL (ref 0.5–0.9)
DIFFERENTIAL TYPE: NORMAL
EOSINOPHILS RELATIVE PERCENT: 2 % (ref 1–4)
GFR AFRICAN AMERICAN: >60 ML/MIN
GFR NON-AFRICAN AMERICAN: >60 ML/MIN
GFR SERPL CREATININE-BSD FRML MDRD: ABNORMAL ML/MIN/{1.73_M2}
GFR SERPL CREATININE-BSD FRML MDRD: ABNORMAL ML/MIN/{1.73_M2}
GLUCOSE BLD-MCNC: 86 MG/DL (ref 70–99)
HCT VFR BLD CALC: 42.7 % (ref 36.3–47.1)
HEMOGLOBIN: 13.7 G/DL (ref 11.9–15.1)
IMMATURE GRANULOCYTES: 0 %
KEPPRA: <2 UG/ML
LYMPHOCYTES # BLD: 26 % (ref 24–43)
MCH RBC QN AUTO: 31.1 PG (ref 25.2–33.5)
MCHC RBC AUTO-ENTMCNC: 32.1 G/DL (ref 28.4–34.8)
MCV RBC AUTO: 96.8 FL (ref 82.6–102.9)
MONOCYTES # BLD: 6 % (ref 3–12)
NRBC AUTOMATED: 0 PER 100 WBC
PDW BLD-RTO: 12.3 % (ref 11.8–14.4)
PLATELET # BLD: 241 K/UL (ref 138–453)
PLATELET ESTIMATE: NORMAL
PMV BLD AUTO: 10.6 FL (ref 8.1–13.5)
POTASSIUM SERPL-SCNC: 4.2 MMOL/L (ref 3.7–5.3)
RBC # BLD: 4.41 M/UL (ref 3.95–5.11)
RBC # BLD: NORMAL 10*6/UL
SEG NEUTROPHILS: 65 % (ref 36–65)
SEGMENTED NEUTROPHILS ABSOLUTE COUNT: 5.16 K/UL (ref 1.5–8.1)
SODIUM BLD-SCNC: 138 MMOL/L (ref 135–144)
WBC # BLD: 7.9 K/UL (ref 3.5–11.3)
WBC # BLD: NORMAL 10*3/UL

## 2018-10-23 PROCEDURE — 80048 BASIC METABOLIC PNL TOTAL CA: CPT

## 2018-10-23 PROCEDURE — 12011 RPR F/E/E/N/L/M 2.5 CM/<: CPT

## 2018-10-23 PROCEDURE — 85025 COMPLETE CBC W/AUTO DIFF WBC: CPT

## 2018-10-23 PROCEDURE — 99284 EMERGENCY DEPT VISIT MOD MDM: CPT

## 2018-10-23 PROCEDURE — 99284 EMERGENCY DEPT VISIT MOD MDM: CPT | Performed by: NURSE PRACTITIONER

## 2018-10-23 PROCEDURE — G0378 HOSPITAL OBSERVATION PER HR: HCPCS

## 2018-10-23 PROCEDURE — 80177 DRUG SCRN QUAN LEVETIRACETAM: CPT

## 2018-10-23 PROCEDURE — 6360000002 HC RX W HCPCS: Performed by: EMERGENCY MEDICINE

## 2018-10-23 RX ORDER — LEVETIRACETAM 10 MG/ML
1000 INJECTION INTRAVASCULAR ONCE
Status: DISCONTINUED | OUTPATIENT
Start: 2018-10-23 | End: 2018-10-23

## 2018-10-23 RX ORDER — LORAZEPAM 2 MG/ML
INJECTION INTRAMUSCULAR
Status: DISCONTINUED
Start: 2018-10-23 | End: 2018-10-23 | Stop reason: HOSPADM

## 2018-10-23 RX ORDER — SODIUM CHLORIDE 0.9 % (FLUSH) 0.9 %
10 SYRINGE (ML) INJECTION PRN
Status: CANCELLED | OUTPATIENT
Start: 2018-10-23

## 2018-10-23 RX ORDER — LEVETIRACETAM 100 MG/ML
500 SOLUTION ORAL 2 TIMES DAILY
Status: CANCELLED | OUTPATIENT
Start: 2018-10-23

## 2018-10-23 RX ORDER — LORAZEPAM 2 MG/ML
1 INJECTION INTRAMUSCULAR PRN
Status: CANCELLED | OUTPATIENT
Start: 2018-10-23

## 2018-10-23 RX ORDER — LEVETIRACETAM 10 MG/ML
2000 INJECTION INTRAVASCULAR ONCE
Status: COMPLETED | OUTPATIENT
Start: 2018-10-23 | End: 2018-10-23

## 2018-10-23 RX ORDER — ACETAMINOPHEN 325 MG/1
650 TABLET ORAL EVERY 4 HOURS PRN
Status: CANCELLED | OUTPATIENT
Start: 2018-10-23

## 2018-10-23 RX ORDER — LEVETIRACETAM 100 MG/ML
500 SOLUTION ORAL 2 TIMES DAILY
Status: DISCONTINUED | OUTPATIENT
Start: 2018-10-23 | End: 2018-10-23

## 2018-10-23 RX ORDER — ONDANSETRON 4 MG/1
4 TABLET, FILM COATED ORAL ONCE
Status: COMPLETED | OUTPATIENT
Start: 2018-10-23 | End: 2018-10-23

## 2018-10-23 RX ORDER — LEVETIRACETAM 500 MG/1
500 TABLET ORAL 2 TIMES DAILY
Status: CANCELLED | OUTPATIENT
Start: 2018-10-23

## 2018-10-23 RX ORDER — SODIUM CHLORIDE 0.9 % (FLUSH) 0.9 %
10 SYRINGE (ML) INJECTION EVERY 12 HOURS SCHEDULED
Status: CANCELLED | OUTPATIENT
Start: 2018-10-23

## 2018-10-23 RX ADMIN — LEVETIRACETAM 1000 MG: 10 INJECTION INTRAVENOUS at 11:28

## 2018-10-23 RX ADMIN — ONDANSETRON HYDROCHLORIDE 4 MG: 4 TABLET, FILM COATED ORAL at 10:44

## 2018-10-23 ASSESSMENT — PAIN DESCRIPTION - DESCRIPTORS: DESCRIPTORS: HEADACHE

## 2018-10-23 ASSESSMENT — PAIN SCALES - GENERAL: PAINLEVEL_OUTOF10: 10

## 2018-10-23 NOTE — Clinical Note
Patient Class: Observation [104]   REQUIRED: Diagnosis: Breakthrough seizure ([facility] [address] [232722]   Estimated Length of Stay: Estimated stay of less than 2 midnights   Future Attending Provider: Scout Wall [[id number]]   Admitting Provider: Scout Wall [[id number]]   Preferred Department: [facility]

## 2018-10-23 NOTE — ED PROVIDER NOTES
[facility] ED  Emergency Department Encounter  Emergency Medicine Resident     Pt Name: Hiram Browne  MRN: [id number]  [place] 1995  Date of evaluation: 10/23/18  PCP:  Jabari Porter MD    [address] [place] [address]       Chief Complaint   Patient presents with    Seizures     breakthrough seizure, pt hit head, vomited. no loss of bowel or bladder. pt a&o x4, placed on full monitor       HISTORY OF PRESENT ILLNESS  (Location/Symptom, Timing/Onset, Context/Setting, Quality, Duration, Modifying Factors, Severity.)      Hiram Browne is a 21 y.o. female who presents with Breakthrough seizure. Patient states she's been having 4-5 breakthrough seizures a year since first starting to have seizures in 2015. Briefly patient was diagnosed with brain mass in 2015 which was removed by Dr. Jeny Fried at [facility].  Patient then began to have seizures 2015 and followed up with neurology one time, who started her on Keppra 500 mg twice a day. Patient states she is not seeing neurology again since 2015. Receive family practice prescribes her Keppra. Patient states she was walking door when she woke up lying on the ground with a wound to right face and bite to her tongue prompting her mother to drive her to the emergency room. While in emergency room patient has no other complaints including headache, neck pain, back pain, fevers, chills, weakness, numbness, dysuria, hematuria. Of note patient did have full tonic clonic seizure lasted approximately 2 minutes while she was in the emergency room back on one hour after his prior seizure. Patient's Keppra level came back less than 2. Patient loaded with 2 g of Keppra. Patient was postictal after the second seizure for approximately 10 minutes then back to her baseline. PAST MEDICAL / SURGICAL / SOCIAL / FAMILY HISTORY      has a past medical history of Anxiety and Seizures ([address] has a past surgical history that includes brain surgery.     Social

## 2018-10-24 ENCOUNTER — HOSPITAL ENCOUNTER (OUTPATIENT)
Age: 23
Setting detail: SPECIMEN
Discharge: HOME | End: 2018-10-24
Payer: MEDICARE

## 2018-10-24 ENCOUNTER — OFFICE VISIT (OUTPATIENT)
Dept: FAMILY MEDICINE CLINIC | Age: 23
End: 2018-10-24
Payer: MEDICARE

## 2018-10-24 VITALS
DIASTOLIC BLOOD PRESSURE: 65 MMHG | TEMPERATURE: 97.9 F | WEIGHT: 166 LBS | HEIGHT: 62 IN | BODY MASS INDEX: 30.55 KG/M2 | SYSTOLIC BLOOD PRESSURE: 119 MMHG | HEART RATE: 106 BPM

## 2018-10-24 DIAGNOSIS — J30.9 ALLERGIC RHINITIS, UNSPECIFIED SEASONALITY, UNSPECIFIED TRIGGER: ICD-10-CM

## 2018-10-24 DIAGNOSIS — G40.909 SEIZURE DISORDER (HCC): Primary | ICD-10-CM

## 2018-10-24 DIAGNOSIS — R30.0 DYSURIA: ICD-10-CM

## 2018-10-24 LAB
BILIRUBIN, POC: NEGATIVE
BLOOD URINE, POC: NEGATIVE
CLARITY, POC: CLEAR
COLOR, POC: YELLOW
GLUCOSE URINE, POC: NEGATIVE
KETONES, POC: NEGATIVE
LEUKOCYTE EST, POC: NEGATIVE
NITRITE, POC: NEGATIVE
PH, POC: 6
PROTEIN, POC: NEGATIVE
SPECIFIC GRAVITY, POC: 1.02
UROBILINOGEN, POC: 1

## 2018-10-24 PROCEDURE — 99213 OFFICE O/P EST LOW 20 MIN: CPT | Performed by: STUDENT IN AN ORGANIZED HEALTH CARE EDUCATION/TRAINING PROGRAM

## 2018-10-24 PROCEDURE — 81003 URINALYSIS AUTO W/O SCOPE: CPT | Performed by: STUDENT IN AN ORGANIZED HEALTH CARE EDUCATION/TRAINING PROGRAM

## 2018-10-24 RX ORDER — LEVETIRACETAM 500 MG/1
TABLET ORAL
Qty: 60 TABLET | Refills: 0 | Status: SHIPPED | OUTPATIENT
Start: 2018-10-24 | End: 2018-11-27 | Stop reason: SDUPTHER

## 2018-10-24 RX ORDER — LEVETIRACETAM 500 MG/1
TABLET ORAL
Qty: 60 TABLET | Refills: 0 | Status: SHIPPED | OUTPATIENT
Start: 2018-10-24 | End: 2018-10-24 | Stop reason: SDUPTHER

## 2018-10-24 RX ORDER — CETIRIZINE HYDROCHLORIDE 10 MG/1
10 TABLET ORAL DAILY
Qty: 30 TABLET | Refills: 0 | Status: SHIPPED | OUTPATIENT
Start: 2018-10-24 | End: 2019-07-12 | Stop reason: SDUPTHER

## 2018-10-24 RX ORDER — ALBUTEROL SULFATE 90 UG/1
2 AEROSOL, METERED RESPIRATORY (INHALATION) EVERY 6 HOURS PRN
Qty: 1 INHALER | Refills: 3 | Status: SHIPPED | OUTPATIENT
Start: 2018-10-24 | End: 2020-06-03 | Stop reason: SDUPTHER

## 2018-10-24 ASSESSMENT — PATIENT HEALTH QUESTIONNAIRE - PHQ9
2. FEELING DOWN, DEPRESSED OR HOPELESS: 0
SUM OF ALL RESPONSES TO PHQ9 QUESTIONS 1 & 2: 0
SUM OF ALL RESPONSES TO PHQ QUESTIONS 1-9: 0
1. LITTLE INTEREST OR PLEASURE IN DOING THINGS: 0
SUM OF ALL RESPONSES TO PHQ QUESTIONS 1-9: 0

## 2018-10-24 ASSESSMENT — ENCOUNTER SYMPTOMS
BACK PAIN: 0
NAUSEA: 0
VOMITING: 0

## 2018-10-24 NOTE — LETTER
[address]  [address] [place] [address]  Phone: [phone]  Fax: [phone]    Alvin Farmer MD        October 24, 2018     Patient: Michael Tolbert   YOB: 1995   Date of Visit: 10/24/2018       To Whom It May Concern: It is my medical opinion that Leticia Land may return to full duty immediately with no restrictions. If you have any questions or concerns, please don't hesitate to call.     Sincerely,            Alvin Farmer MD

## 2018-10-25 LAB
CULTURE: NORMAL
Lab: NORMAL
SPECIMEN DESCRIPTION: NORMAL
STATUS: NORMAL

## 2018-10-29 ENCOUNTER — TELEPHONE (OUTPATIENT)
Dept: ADMINISTRATIVE | Age: 23
End: 2018-10-29

## 2018-12-18 ENCOUNTER — TELEPHONE (OUTPATIENT)
Dept: FAMILY MEDICINE CLINIC | Age: 23
End: 2018-12-18

## 2019-01-08 ENCOUNTER — OFFICE VISIT (OUTPATIENT)
Dept: FAMILY MEDICINE CLINIC | Age: 24
End: 2019-01-08
Payer: MEDICARE

## 2019-01-08 VITALS
WEIGHT: 163 LBS | TEMPERATURE: 98.4 F | BODY MASS INDEX: 30 KG/M2 | HEART RATE: 71 BPM | SYSTOLIC BLOOD PRESSURE: 107 MMHG | HEIGHT: 62 IN | DIASTOLIC BLOOD PRESSURE: 70 MMHG

## 2019-01-08 DIAGNOSIS — G40.909 SEIZURE DISORDER (HCC): Primary | ICD-10-CM

## 2019-01-08 DIAGNOSIS — N89.8 VAGINAL DISCHARGE: ICD-10-CM

## 2019-01-08 PROCEDURE — 99213 OFFICE O/P EST LOW 20 MIN: CPT | Performed by: FAMILY MEDICINE

## 2019-01-08 PROCEDURE — G8484 FLU IMMUNIZE NO ADMIN: HCPCS | Performed by: FAMILY MEDICINE

## 2019-01-08 PROCEDURE — G8427 DOCREV CUR MEDS BY ELIG CLIN: HCPCS | Performed by: FAMILY MEDICINE

## 2019-01-08 PROCEDURE — 4004F PT TOBACCO SCREEN RCVD TLK: CPT | Performed by: FAMILY MEDICINE

## 2019-01-08 PROCEDURE — G8417 CALC BMI ABV UP PARAM F/U: HCPCS | Performed by: FAMILY MEDICINE

## 2019-01-08 RX ORDER — FLUCONAZOLE 150 MG/1
150 TABLET ORAL ONCE
Qty: 1 TABLET | Refills: 0 | Status: SHIPPED | OUTPATIENT
Start: 2019-01-08 | End: 2019-01-08

## 2019-01-08 ASSESSMENT — ENCOUNTER SYMPTOMS
ABDOMINAL PAIN: 0
NAUSEA: 0
CONSTIPATION: 0
DIARRHEA: 0
COUGH: 0
VOMITING: 0
SHORTNESS OF BREATH: 0

## 2019-03-09 ENCOUNTER — HOSPITAL ENCOUNTER (EMERGENCY)
Age: 24
Discharge: HOME | End: 2019-03-09
Attending: EMERGENCY MEDICINE
Payer: MEDICARE

## 2019-03-09 VITALS
DIASTOLIC BLOOD PRESSURE: 70 MMHG | SYSTOLIC BLOOD PRESSURE: 103 MMHG | RESPIRATION RATE: 16 BRPM | BODY MASS INDEX: 29.07 KG/M2 | HEART RATE: 79 BPM | WEIGHT: 159 LBS | TEMPERATURE: 97.2 F | OXYGEN SATURATION: 100 %

## 2019-03-09 DIAGNOSIS — N39.0 URINARY TRACT INFECTION WITHOUT HEMATURIA, SITE UNSPECIFIED: Primary | ICD-10-CM

## 2019-03-09 LAB
-: ABNORMAL
AMORPHOUS: ABNORMAL
BACTERIA: ABNORMAL
BILIRUBIN URINE: NEGATIVE
CASTS UA: ABNORMAL /LPF (ref 0–2)
CASTS UA: ABNORMAL /LPF (ref 0–2)
COLOR: ABNORMAL
CRYSTALS, UA: ABNORMAL /HPF
CRYSTALS, UA: ABNORMAL /HPF
DIRECT EXAM: NORMAL
EPITHELIAL CELLS UA: ABNORMAL /HPF (ref 0–5)
GLUCOSE URINE: NEGATIVE
HCG(URINE) PREGNANCY TEST: NEGATIVE
KETONES, URINE: ABNORMAL
LEUKOCYTE ESTERASE, URINE: ABNORMAL
Lab: NORMAL
MUCUS: ABNORMAL
NITRITE, URINE: NEGATIVE
OTHER OBSERVATIONS UA: ABNORMAL
PH UA: 6 (ref 5–8)
PROTEIN UA: ABNORMAL
RBC UA: ABNORMAL /HPF (ref 0–2)
RENAL EPITHELIAL, UA: ABNORMAL /HPF
SPECIFIC GRAVITY UA: 1.04 (ref 1–1.03)
SPECIMEN DESCRIPTION: NORMAL
TRICHOMONAS: ABNORMAL
TURBIDITY: ABNORMAL
URINE HGB: NEGATIVE
UROBILINOGEN, URINE: NORMAL
WBC UA: ABNORMAL /HPF (ref 0–5)
YEAST: ABNORMAL

## 2019-03-09 PROCEDURE — 87591 N.GONORRHOEAE DNA AMP PROB: CPT

## 2019-03-09 PROCEDURE — 87660 TRICHOMONAS VAGIN DIR PROBE: CPT

## 2019-03-09 PROCEDURE — 84703 CHORIONIC GONADOTROPIN ASSAY: CPT

## 2019-03-09 PROCEDURE — 87491 CHLMYD TRACH DNA AMP PROBE: CPT

## 2019-03-09 PROCEDURE — G0382 LEV 3 HOSP TYPE B ED VISIT: HCPCS

## 2019-03-09 PROCEDURE — 87480 CANDIDA DNA DIR PROBE: CPT

## 2019-03-09 PROCEDURE — 87510 GARDNER VAG DNA DIR PROBE: CPT

## 2019-03-09 PROCEDURE — 81001 URINALYSIS AUTO W/SCOPE: CPT

## 2019-03-09 RX ORDER — CEPHALEXIN 500 MG/1
500 CAPSULE ORAL ONCE
Status: DISCONTINUED | OUTPATIENT
Start: 2019-03-09 | End: 2019-03-09 | Stop reason: HOSPADM

## 2019-03-09 RX ORDER — METRONIDAZOLE 500 MG/1
500 TABLET ORAL 3 TIMES DAILY
COMMUNITY
End: 2019-03-24 | Stop reason: SDUPTHER

## 2019-03-09 RX ORDER — CEPHALEXIN 250 MG/5ML
500 POWDER, FOR SUSPENSION ORAL 4 TIMES DAILY
Qty: 400 ML | Refills: 0 | Status: SHIPPED | OUTPATIENT
Start: 2019-03-09 | End: 2019-03-19

## 2019-03-09 RX ORDER — CEPHALEXIN 500 MG/1
500 CAPSULE ORAL 4 TIMES DAILY
Qty: 28 CAPSULE | Refills: 0 | Status: SHIPPED | OUTPATIENT
Start: 2019-03-09 | End: 2019-03-09

## 2019-03-09 RX ORDER — CEPHALEXIN 500 MG/1
500 CAPSULE ORAL 4 TIMES DAILY
Qty: 40 CAPSULE | Refills: 0 | Status: SHIPPED | OUTPATIENT
Start: 2019-03-09 | End: 2019-03-09

## 2019-03-09 ASSESSMENT — ENCOUNTER SYMPTOMS
EYE PAIN: 0
SORE THROAT: 0
PHOTOPHOBIA: 0
COUGH: 0
NAUSEA: 0
VOMITING: 0
EYE DISCHARGE: 0
SHORTNESS OF BREATH: 0
BACK PAIN: 0
ABDOMINAL DISTENTION: 0
RHINORRHEA: 0
WHEEZING: 0
CHEST TIGHTNESS: 0

## 2019-03-11 LAB
C TRACH DNA GENITAL QL NAA+PROBE: NEGATIVE
N. GONORRHOEAE DNA: NEGATIVE
SPECIMEN DESCRIPTION: NORMAL

## 2019-03-24 ENCOUNTER — HOSPITAL ENCOUNTER (EMERGENCY)
Age: 24
Discharge: HOME | End: 2019-03-24
Attending: EMERGENCY MEDICINE
Payer: MEDICARE

## 2019-03-24 VITALS
SYSTOLIC BLOOD PRESSURE: 124 MMHG | DIASTOLIC BLOOD PRESSURE: 81 MMHG | RESPIRATION RATE: 16 BRPM | HEART RATE: 80 BPM | OXYGEN SATURATION: 100 % | TEMPERATURE: 97.9 F

## 2019-03-24 DIAGNOSIS — B96.89 BACTERIAL VAGINOSIS: Primary | ICD-10-CM

## 2019-03-24 DIAGNOSIS — N76.0 BACTERIAL VAGINOSIS: Primary | ICD-10-CM

## 2019-03-24 DIAGNOSIS — N39.0 URINARY TRACT INFECTION WITHOUT HEMATURIA, SITE UNSPECIFIED: ICD-10-CM

## 2019-03-24 LAB
-: ABNORMAL
AMORPHOUS: ABNORMAL
AMPHETAMINE SCREEN URINE: NEGATIVE
BACTERIA: ABNORMAL
BARBITURATE SCREEN URINE: NEGATIVE
BENZODIAZEPINE SCREEN, URINE: NEGATIVE
BILIRUBIN URINE: ABNORMAL
BUPRENORPHINE URINE: ABNORMAL
CANNABINOID SCREEN URINE: POSITIVE
CASTS UA: ABNORMAL /LPF (ref 0–8)
COCAINE METABOLITE, URINE: NEGATIVE
COLOR: ABNORMAL
COMMENT UA: ABNORMAL
CRYSTALS, UA: ABNORMAL /HPF
DIRECT EXAM: ABNORMAL
DIRECT EXAM: NORMAL
EPITHELIAL CELLS UA: ABNORMAL /HPF (ref 0–5)
GLUCOSE URINE: NEGATIVE
HCG(URINE) PREGNANCY TEST: NEGATIVE
KETONES, URINE: ABNORMAL
LEUKOCYTE ESTERASE, URINE: NEGATIVE
Lab: ABNORMAL
Lab: NORMAL
MDMA URINE: ABNORMAL
METHADONE SCREEN, URINE: NEGATIVE
METHAMPHETAMINE, URINE: ABNORMAL
MUCUS: ABNORMAL
NITRITE, URINE: NEGATIVE
OPIATES, URINE: NEGATIVE
OTHER OBSERVATIONS UA: ABNORMAL
OXYCODONE SCREEN URINE: NEGATIVE
PH UA: 6 (ref 5–8)
PHENCYCLIDINE, URINE: NEGATIVE
PROPOXYPHENE, URINE: ABNORMAL
PROTEIN UA: ABNORMAL
RBC UA: ABNORMAL /HPF (ref 0–4)
RENAL EPITHELIAL, UA: ABNORMAL /HPF
SPECIFIC GRAVITY UA: 1.03 (ref 1–1.03)
SPECIMEN DESCRIPTION: ABNORMAL
SPECIMEN DESCRIPTION: NORMAL
TEST INFORMATION: ABNORMAL
TRICHOMONAS: ABNORMAL
TRICYCLIC ANTIDEPRESSANTS, UR: ABNORMAL
TURBIDITY: ABNORMAL
URINE HGB: NEGATIVE
UROBILINOGEN, URINE: NORMAL
WBC UA: ABNORMAL /HPF (ref 0–5)
YEAST: ABNORMAL

## 2019-03-24 PROCEDURE — 80307 DRUG TEST PRSMV CHEM ANLYZR: CPT

## 2019-03-24 PROCEDURE — 87804 INFLUENZA ASSAY W/OPTIC: CPT

## 2019-03-24 PROCEDURE — 84703 CHORIONIC GONADOTROPIN ASSAY: CPT

## 2019-03-24 PROCEDURE — 81001 URINALYSIS AUTO W/SCOPE: CPT

## 2019-03-24 PROCEDURE — 87491 CHLMYD TRACH DNA AMP PROBE: CPT

## 2019-03-24 PROCEDURE — 87591 N.GONORRHOEAE DNA AMP PROB: CPT

## 2019-03-24 PROCEDURE — 87510 GARDNER VAG DNA DIR PROBE: CPT

## 2019-03-24 PROCEDURE — 87660 TRICHOMONAS VAGIN DIR PROBE: CPT

## 2019-03-24 PROCEDURE — 87480 CANDIDA DNA DIR PROBE: CPT

## 2019-03-24 PROCEDURE — 99284 EMERGENCY DEPT VISIT MOD MDM: CPT

## 2019-03-24 PROCEDURE — 6370000000 HC RX 637 (ALT 250 FOR IP): Performed by: STUDENT IN AN ORGANIZED HEALTH CARE EDUCATION/TRAINING PROGRAM

## 2019-03-24 PROCEDURE — 87086 URINE CULTURE/COLONY COUNT: CPT

## 2019-03-24 RX ORDER — CEPHALEXIN 500 MG/1
500 CAPSULE ORAL 2 TIMES DAILY
Qty: 14 CAPSULE | Refills: 0 | Status: SHIPPED | OUTPATIENT
Start: 2019-03-24 | End: 2019-03-31

## 2019-03-24 RX ORDER — ONDANSETRON 4 MG/1
4 TABLET, FILM COATED ORAL 3 TIMES DAILY PRN
Qty: 20 TABLET | Refills: 0 | Status: SHIPPED | OUTPATIENT
Start: 2019-03-24 | End: 2020-11-19

## 2019-03-24 RX ORDER — FAMOTIDINE 20 MG/1
20 TABLET, FILM COATED ORAL ONCE
Status: COMPLETED | OUTPATIENT
Start: 2019-03-24 | End: 2019-03-24

## 2019-03-24 RX ORDER — DICYCLOMINE HYDROCHLORIDE 10 MG/1
10 CAPSULE ORAL ONCE
Status: COMPLETED | OUTPATIENT
Start: 2019-03-24 | End: 2019-03-24

## 2019-03-24 RX ORDER — ONDANSETRON 4 MG/1
4 TABLET, FILM COATED ORAL ONCE
Status: COMPLETED | OUTPATIENT
Start: 2019-03-24 | End: 2019-03-24

## 2019-03-24 RX ORDER — METRONIDAZOLE 500 MG/1
500 TABLET ORAL 2 TIMES DAILY
Qty: 14 TABLET | Refills: 0 | Status: SHIPPED | OUTPATIENT
Start: 2019-03-24 | End: 2019-03-31

## 2019-03-24 RX ORDER — DICYCLOMINE HYDROCHLORIDE 10 MG/1
10 CAPSULE ORAL DAILY PRN
Qty: 10 CAPSULE | Refills: 0 | Status: SHIPPED | OUTPATIENT
Start: 2019-03-24 | End: 2019-07-12

## 2019-03-24 RX ADMIN — ONDANSETRON HYDROCHLORIDE 4 MG: 4 TABLET, FILM COATED ORAL at 09:57

## 2019-03-24 RX ADMIN — FAMOTIDINE 20 MG: 20 TABLET, FILM COATED ORAL at 09:57

## 2019-03-24 RX ADMIN — DICYCLOMINE HYDROCHLORIDE 10 MG: 10 CAPSULE ORAL at 09:57

## 2019-03-24 ASSESSMENT — ENCOUNTER SYMPTOMS
RHINORRHEA: 0
WHEEZING: 0
EYE DISCHARGE: 0
ABDOMINAL DISTENTION: 0
VOMITING: 1
EYE ITCHING: 0
DIARRHEA: 1
COUGH: 1
ABDOMINAL PAIN: 1
SHORTNESS OF BREATH: 0
BACK PAIN: 0
NAUSEA: 1

## 2019-03-24 ASSESSMENT — PAIN DESCRIPTION - LOCATION: LOCATION: ABDOMEN

## 2019-03-24 ASSESSMENT — PAIN DESCRIPTION - ORIENTATION: ORIENTATION: LOWER

## 2019-03-24 ASSESSMENT — PAIN DESCRIPTION - PAIN TYPE: TYPE: ACUTE PAIN

## 2019-03-24 ASSESSMENT — PAIN SCALES - GENERAL: PAINLEVEL_OUTOF10: 7

## 2019-03-25 LAB
CULTURE: NORMAL
Lab: NORMAL
SPECIMEN DESCRIPTION: NORMAL

## 2019-04-24 ENCOUNTER — APPOINTMENT (OUTPATIENT)
Dept: MRI IMAGING | Age: 24
DRG: 053 | End: 2019-04-24
Payer: MEDICARE

## 2019-04-24 ENCOUNTER — APPOINTMENT (OUTPATIENT)
Dept: GENERAL RADIOLOGY | Age: 24
DRG: 053 | End: 2019-04-24
Payer: MEDICARE

## 2019-04-24 ENCOUNTER — HOSPITAL ENCOUNTER (INPATIENT)
Age: 24
LOS: 1 days | Discharge: HOME | DRG: 053 | End: 2019-04-25
Attending: EMERGENCY MEDICINE | Admitting: PSYCHIATRY & NEUROLOGY
Payer: MEDICARE

## 2019-04-24 DIAGNOSIS — R56.9 SEIZURE (HCC): Primary | ICD-10-CM

## 2019-04-24 LAB
-: NORMAL
ABSOLUTE EOS #: 0.11 K/UL (ref 0–0.44)
ABSOLUTE IMMATURE GRANULOCYTE: 0.07 K/UL (ref 0–0.3)
ABSOLUTE LYMPH #: 2.99 K/UL (ref 1.1–3.7)
ABSOLUTE MONO #: 0.8 K/UL (ref 0.1–1.2)
AMORPHOUS: NORMAL
AMPHETAMINE SCREEN URINE: NEGATIVE
ANION GAP SERPL CALCULATED.3IONS-SCNC: 18 MMOL/L (ref 9–17)
BACTERIA: NORMAL
BARBITURATE SCREEN URINE: NEGATIVE
BASOPHILS # BLD: 0 % (ref 0–2)
BASOPHILS ABSOLUTE: 0.03 K/UL (ref 0–0.2)
BENZODIAZEPINE SCREEN, URINE: NEGATIVE
BILIRUBIN URINE: NEGATIVE
BUN BLDV-MCNC: 10 MG/DL (ref 6–20)
BUN/CREAT BLD: ABNORMAL (ref 9–20)
BUPRENORPHINE URINE: ABNORMAL
CALCIUM SERPL-MCNC: 8.9 MG/DL (ref 8.6–10.4)
CANNABINOID SCREEN URINE: POSITIVE
CASTS UA: NORMAL /LPF (ref 0–8)
CHLORIDE BLD-SCNC: 101 MMOL/L (ref 98–107)
CO2: 16 MMOL/L (ref 20–31)
COCAINE METABOLITE, URINE: NEGATIVE
COLOR: YELLOW
COMMENT UA: ABNORMAL
CREAT SERPL-MCNC: 1.05 MG/DL (ref 0.5–0.9)
CRYSTALS, UA: NORMAL /HPF
CULTURE: NO GROWTH
DIFFERENTIAL TYPE: ABNORMAL
EOSINOPHILS RELATIVE PERCENT: 1 % (ref 1–4)
EPITHELIAL CELLS UA: NORMAL /HPF (ref 0–5)
GFR AFRICAN AMERICAN: >60 ML/MIN
GFR NON-AFRICAN AMERICAN: >60 ML/MIN
GFR SERPL CREATININE-BSD FRML MDRD: ABNORMAL ML/MIN/{1.73_M2}
GFR SERPL CREATININE-BSD FRML MDRD: ABNORMAL ML/MIN/{1.73_M2}
GLUCOSE BLD-MCNC: 94 MG/DL (ref 70–99)
GLUCOSE URINE: NEGATIVE
HCG QUALITATIVE: NEGATIVE
HCG(URINE) PREGNANCY TEST: NEGATIVE
HCT VFR BLD CALC: 44.1 % (ref 36.3–47.1)
HEMOGLOBIN: 14.2 G/DL (ref 11.9–15.1)
IMMATURE GRANULOCYTES: 1 %
KEPPRA: <2 UG/ML
KETONES, URINE: ABNORMAL
LEUKOCYTE ESTERASE, URINE: ABNORMAL
LYMPHOCYTES # BLD: 24 % (ref 24–43)
Lab: NORMAL
MCH RBC QN AUTO: 31.2 PG (ref 25.2–33.5)
MCHC RBC AUTO-ENTMCNC: 32.2 G/DL (ref 28.4–34.8)
MCV RBC AUTO: 96.9 FL (ref 82.6–102.9)
MDMA URINE: ABNORMAL
METHADONE SCREEN, URINE: NEGATIVE
METHAMPHETAMINE, URINE: ABNORMAL
MONOCYTES # BLD: 6 % (ref 3–12)
MUCUS: NORMAL
NITRITE, URINE: NEGATIVE
NRBC AUTOMATED: 0 PER 100 WBC
OPIATES, URINE: NEGATIVE
OTHER OBSERVATIONS UA: NORMAL
OXYCODONE SCREEN URINE: NEGATIVE
PDW BLD-RTO: 12.5 % (ref 11.8–14.4)
PH UA: 5.5 (ref 5–8)
PHENCYCLIDINE, URINE: NEGATIVE
PLATELET # BLD: 218 K/UL (ref 138–453)
PLATELET ESTIMATE: ABNORMAL
PMV BLD AUTO: 10.6 FL (ref 8.1–13.5)
POTASSIUM SERPL-SCNC: 3.7 MMOL/L (ref 3.7–5.3)
PROPOXYPHENE, URINE: ABNORMAL
PROTEIN UA: NEGATIVE
RBC # BLD: 4.55 M/UL (ref 3.95–5.11)
RBC # BLD: ABNORMAL 10*6/UL
RBC UA: NORMAL /HPF (ref 0–4)
RENAL EPITHELIAL, UA: NORMAL /HPF
SEG NEUTROPHILS: 68 % (ref 36–65)
SEGMENTED NEUTROPHILS ABSOLUTE COUNT: 8.53 K/UL (ref 1.5–8.1)
SODIUM BLD-SCNC: 135 MMOL/L (ref 135–144)
SPECIFIC GRAVITY UA: 1.02 (ref 1–1.03)
SPECIMEN DESCRIPTION: NORMAL
TEST INFORMATION: ABNORMAL
TRICHOMONAS: NORMAL
TRICYCLIC ANTIDEPRESSANTS, UR: ABNORMAL
TURBIDITY: CLEAR
URINE HGB: ABNORMAL
UROBILINOGEN, URINE: NORMAL
WBC # BLD: 12.5 K/UL (ref 3.5–11.3)
WBC # BLD: ABNORMAL 10*3/UL
WBC UA: NORMAL /HPF (ref 0–5)
YEAST: NORMAL

## 2019-04-24 PROCEDURE — 96375 TX/PRO/DX INJ NEW DRUG ADDON: CPT

## 2019-04-24 PROCEDURE — 2580000003 HC RX 258: Performed by: EMERGENCY MEDICINE

## 2019-04-24 PROCEDURE — 94762 N-INVAS EAR/PLS OXIMTRY CONT: CPT

## 2019-04-24 PROCEDURE — 70553 MRI BRAIN STEM W/O & W/DYE: CPT

## 2019-04-24 PROCEDURE — 81001 URINALYSIS AUTO W/SCOPE: CPT

## 2019-04-24 PROCEDURE — 6370000000 HC RX 637 (ALT 250 FOR IP): Performed by: PSYCHIATRY & NEUROLOGY

## 2019-04-24 PROCEDURE — 2580000003 HC RX 258: Performed by: FAMILY MEDICINE

## 2019-04-24 PROCEDURE — 80048 BASIC METABOLIC PNL TOTAL CA: CPT

## 2019-04-24 PROCEDURE — 85025 COMPLETE CBC W/AUTO DIFF WBC: CPT

## 2019-04-24 PROCEDURE — 6370000000 HC RX 637 (ALT 250 FOR IP): Performed by: FAMILY MEDICINE

## 2019-04-24 PROCEDURE — 96365 THER/PROPH/DIAG IV INF INIT: CPT

## 2019-04-24 PROCEDURE — 99406 BEHAV CHNG SMOKING 3-10 MIN: CPT

## 2019-04-24 PROCEDURE — 80307 DRUG TEST PRSMV CHEM ANLYZR: CPT

## 2019-04-24 PROCEDURE — 71045 X-RAY EXAM CHEST 1 VIEW: CPT

## 2019-04-24 PROCEDURE — 6360000002 HC RX W HCPCS: Performed by: EMERGENCY MEDICINE

## 2019-04-24 PROCEDURE — 87086 URINE CULTURE/COLONY COUNT: CPT

## 2019-04-24 PROCEDURE — 99285 EMERGENCY DEPT VISIT HI MDM: CPT

## 2019-04-24 PROCEDURE — 1200000000 HC SEMI PRIVATE

## 2019-04-24 PROCEDURE — 99222 1ST HOSP IP/OBS MODERATE 55: CPT | Performed by: PSYCHIATRY & NEUROLOGY

## 2019-04-24 PROCEDURE — A9576 INJ PROHANCE MULTIPACK: HCPCS | Performed by: FAMILY MEDICINE

## 2019-04-24 PROCEDURE — 80177 DRUG SCRN QUAN LEVETIRACETAM: CPT

## 2019-04-24 PROCEDURE — 6360000004 HC RX CONTRAST MEDICATION: Performed by: FAMILY MEDICINE

## 2019-04-24 PROCEDURE — 84703 CHORIONIC GONADOTROPIN ASSAY: CPT

## 2019-04-24 RX ORDER — ALBUTEROL SULFATE 90 UG/1
2 AEROSOL, METERED RESPIRATORY (INHALATION) EVERY 6 HOURS PRN
Status: DISCONTINUED | OUTPATIENT
Start: 2019-04-24 | End: 2019-04-25 | Stop reason: HOSPADM

## 2019-04-24 RX ORDER — SERTRALINE HYDROCHLORIDE 25 MG/1
25 TABLET, FILM COATED ORAL DAILY
Status: DISCONTINUED | OUTPATIENT
Start: 2019-04-24 | End: 2019-04-25 | Stop reason: HOSPADM

## 2019-04-24 RX ORDER — 0.9 % SODIUM CHLORIDE 0.9 %
1000 INTRAVENOUS SOLUTION INTRAVENOUS ONCE
Status: COMPLETED | OUTPATIENT
Start: 2019-04-24 | End: 2019-04-24

## 2019-04-24 RX ORDER — ONDANSETRON 4 MG/1
4 TABLET, FILM COATED ORAL 3 TIMES DAILY PRN
Status: DISCONTINUED | OUTPATIENT
Start: 2019-04-24 | End: 2019-04-25 | Stop reason: HOSPADM

## 2019-04-24 RX ORDER — LORAZEPAM 2 MG/ML
1 INJECTION INTRAMUSCULAR ONCE
Status: COMPLETED | OUTPATIENT
Start: 2019-04-24 | End: 2019-04-24

## 2019-04-24 RX ORDER — SODIUM CHLORIDE 0.9 % (FLUSH) 0.9 %
10 SYRINGE (ML) INJECTION EVERY 12 HOURS SCHEDULED
Status: DISCONTINUED | OUTPATIENT
Start: 2019-04-24 | End: 2019-04-25 | Stop reason: HOSPADM

## 2019-04-24 RX ORDER — SODIUM CHLORIDE 0.9 % (FLUSH) 0.9 %
10 SYRINGE (ML) INJECTION PRN
Status: DISCONTINUED | OUTPATIENT
Start: 2019-04-24 | End: 2019-04-25 | Stop reason: HOSPADM

## 2019-04-24 RX ORDER — CETIRIZINE HYDROCHLORIDE 10 MG/1
10 TABLET ORAL DAILY
Status: DISCONTINUED | OUTPATIENT
Start: 2019-04-24 | End: 2019-04-25 | Stop reason: HOSPADM

## 2019-04-24 RX ORDER — ONDANSETRON 2 MG/ML
4 INJECTION INTRAMUSCULAR; INTRAVENOUS EVERY 6 HOURS PRN
Status: DISCONTINUED | OUTPATIENT
Start: 2019-04-24 | End: 2019-04-25 | Stop reason: HOSPADM

## 2019-04-24 RX ORDER — ACETAMINOPHEN 325 MG/1
650 TABLET ORAL EVERY 4 HOURS PRN
Status: DISCONTINUED | OUTPATIENT
Start: 2019-04-24 | End: 2019-04-25 | Stop reason: HOSPADM

## 2019-04-24 RX ORDER — LEVETIRACETAM 500 MG/1
500 TABLET ORAL 2 TIMES DAILY
Status: DISCONTINUED | OUTPATIENT
Start: 2019-04-24 | End: 2019-04-24

## 2019-04-24 RX ORDER — LEVETIRACETAM 100 MG/ML
500 SOLUTION ORAL 2 TIMES DAILY
Status: DISCONTINUED | OUTPATIENT
Start: 2019-04-24 | End: 2019-04-25 | Stop reason: HOSPADM

## 2019-04-24 RX ORDER — DICYCLOMINE HYDROCHLORIDE 10 MG/1
10 CAPSULE ORAL DAILY PRN
Status: DISCONTINUED | OUTPATIENT
Start: 2019-04-24 | End: 2019-04-25 | Stop reason: HOSPADM

## 2019-04-24 RX ORDER — LEVETIRACETAM 10 MG/ML
1000 INJECTION INTRAVASCULAR ONCE
Status: COMPLETED | OUTPATIENT
Start: 2019-04-24 | End: 2019-04-24

## 2019-04-24 RX ORDER — LORAZEPAM 2 MG/ML
1 INJECTION INTRAMUSCULAR PRN
Status: DISCONTINUED | OUTPATIENT
Start: 2019-04-24 | End: 2019-04-25 | Stop reason: HOSPADM

## 2019-04-24 RX ADMIN — LEVETIRACETAM 1000 MG: 10 INJECTION INTRAVENOUS at 04:01

## 2019-04-24 RX ADMIN — GADOTERIDOL 13 ML: 279.3 INJECTION, SOLUTION INTRAVENOUS at 15:15

## 2019-04-24 RX ADMIN — Medication 10 ML: at 09:03

## 2019-04-24 RX ADMIN — SODIUM CHLORIDE, PRESERVATIVE FREE 10 ML: 5 INJECTION INTRAVENOUS at 09:04

## 2019-04-24 RX ADMIN — LEVETIRACETAM 500 MG: 500 TABLET, FILM COATED ORAL at 11:11

## 2019-04-24 RX ADMIN — Medication 10 ML: at 20:15

## 2019-04-24 RX ADMIN — SERTRALINE 25 MG: 50 TABLET, FILM COATED ORAL at 20:13

## 2019-04-24 RX ADMIN — LORAZEPAM 1 MG: 2 INJECTION INTRAMUSCULAR; INTRAVENOUS at 03:11

## 2019-04-24 RX ADMIN — LEVETIRACETAM 500 MG: 100 SOLUTION ORAL at 20:13

## 2019-04-24 RX ADMIN — LORAZEPAM 1 MG: 2 INJECTION INTRAMUSCULAR; INTRAVENOUS at 03:08

## 2019-04-24 RX ADMIN — SODIUM CHLORIDE 1000 ML: 9 INJECTION, SOLUTION INTRAVENOUS at 04:29

## 2019-04-24 RX ADMIN — CETIRIZINE HYDROCHLORIDE 10 MG: 10 TABLET ORAL at 11:11

## 2019-04-24 ASSESSMENT — ENCOUNTER SYMPTOMS
VOMITING: 0
RHINORRHEA: 0
EYE PAIN: 0
COLOR CHANGE: 0
ABDOMINAL PAIN: 0
SORE THROAT: 0
COUGH: 0
NAUSEA: 0
SHORTNESS OF BREATH: 0

## 2019-04-24 NOTE — ED NOTES
Attempted to call report to floor, they will call back when they have obtained seizure pads.       Marta June, RN  04/24/19 6027

## 2019-04-24 NOTE — ED PROVIDER NOTES
[address]  ED  eMERGENCY dEPARTMENT eNCOUnter   Attending Attestation     Pt Name: Alireza Pino  MRN: [id number]  [place] 1995  Date of evaluation: 4/24/19       Alireza Pino is a 25 y.o. female who presents with Seizures (Pt has known seizure disorder but generally only has one a week, today she had 2. )      History: Pt presents with seizures. Pt had two seizures prior to arrival. Limited hx due to seizure and post ictal state. Pt having tonic clonic seizure shortly after arrival. All seizures within 1.5 hours. Pt stated to resident that she is taking her meds but missed a dose yesterday. Exam: HR elevated, lungs CTABL, abdomen soft and non tender. Pt somnolent after ativan. Plan for levels, bloodwork, urinalysis, pregnancy, will give O2. Plan for admission. Will consider airway protection if pt continues to seize. CT brain if seizures continue. I performed a history and physical examination of the patient and discussed management with the resident. I reviewed the residents note and agree with the documented findings and plan of care. Any areas of disagreement are noted on the chart. I was personally present for the key portions of any procedures. I have documented in the chart those procedures where I was not present during the key portions. I have personally reviewed all images and agree with the resident's interpretation. I have reviewed the emergency nurses triage note. I agree with the chief complaint, past medical history, past surgical history, allergies, medications, social and family history as documented unless otherwise noted below. Documentation of the HPI, Physical Exam and Medical Decision Making performed by medical students or scribes is based on my personal performance of the HPI, PE and MDM.  For Phys Assistant/ Nurse Practitioner cases/documentation I have had a face to face evaluation of this patient and have completed at least one if not all key elements

## 2019-04-24 NOTE — ED PROVIDER NOTES
Non-medical: Not on file   Tobacco Use    Smoking status: Current Every Day Smoker     Packs/day: 0.50     Types: Cigarettes    Smokeless tobacco: Current User   Substance and Sexual Activity    Alcohol use: No    Drug use: Yes     Types: Marijuana     Comment: daily    Sexual activity: Not on file   Lifestyle    Physical activity:     Days per week: Not on file     Minutes per session: Not on file    Stress: Not on file   Relationships    Social connections:     Talks on phone: Not on file     Gets together: Not on file     Attends Buddhist service: Not on file     Active member of club or organization: Not on file     Attends meetings of clubs or organizations: Not on file     Relationship status: Not on file    Intimate partner violence:     Fear of current or ex partner: Not on file     Emotionally abused: Not on file     Physically abused: Not on file     Forced sexual activity: Not on file   Other Topics Concern    Not on file   Social History Narrative    Not on file       Family History   Problem Relation Age of Onset    Asthma Mother     Depression Mother     High Blood Pressure Mother        Allergies:  Patient has no known allergies. Home Medications:  Prior to Admission medications    Medication Sig Start Date End Date Taking?  Authorizing Provider   ondansetron (ZOFRAN) 4 MG tablet Take 1 tablet by mouth 3 times daily as needed for Nausea or Vomiting 3/24/19   Pastor Bee MD   dicyclomine (BENTYL) 10 MG capsule Take 1 capsule by mouth daily as needed (for abdominal pain) 3/24/19   Jami Guallpa MD   levETIRAcetam (KEPPRA) 500 MG tablet take 1 tablet by mouth twice a day 3/5/19   Td Minaya MD   albuterol sulfate HFA (VENTOLIN HFA) 108 (90 Base) MCG/ACT inhaler Inhale 2 puffs into the lungs every 6 hours as needed for Wheezing 10/24/18   Td Minaya MD   cetirizine (ZYRTEC ALLERGY) 10 MG tablet Take 1 tablet by mouth daily 10/24/18   Td Minaya MD   sertraline motor subscore is 6. Skin: Skin is warm and dry.    Psychiatric: Her behavior is normal.       DIFFERENTIAL  DIAGNOSIS     PLAN (LABS / [place]o Fine / EKG):  Orders Placed This Encounter   Procedures    Urine Culture    XR CHEST PORTABLE    Basic Metabolic Panel    HCG Qualitative, Serum    Levetiracetam Level    Urinalysis Reflex to Culture    CBC Auto Differential    Microscopic Urinalysis    Diet NPO Effective Now    Vital signs per unit routine    Notify physician    Notify physician    Up with assistance    Place intermittent pneumatic compression device    Full Code    Inpatient consult to Neurology    Insert peripheral IV    PATIENT STATUS (FROM ED OR OR/PROCEDURAL) Inpatient       MEDICATIONS ORDERED:  Orders Placed This Encounter   Medications    LORazepam (ATIVAN) injection 1 mg    LORazepam (ATIVAN) injection 1 mg    levetiracetam (KEPPRA) 1000 mg/100 mL IVPB    0.9 % sodium chloride bolus    sodium chloride flush 0.9 % injection 10 mL    sodium chloride flush 0.9 % injection 10 mL    acetaminophen (TYLENOL) tablet 650 mg       DIAGNOSTIC RESULTS / EMERGENCY DEPARTMENT COURSE / MDM     LABS:  Results for orders placed or performed during the hospital encounter of 16/16/74   Basic Metabolic Panel   Result Value Ref Range    Glucose 94 70 - 99 mg/dL    BUN 10 6 - 20 mg/dL    CREATININE 1.05 (H) 0.50 - 0.90 mg/dL    Bun/Cre Ratio NOT REPORTED 9 - 20    Calcium 8.9 8.6 - 10.4 mg/dL    Sodium 135 135 - 144 mmol/L    Potassium 3.7 3.7 - 5.3 mmol/L    Chloride 101 98 - 107 mmol/L    CO2 16 (L) 20 - 31 mmol/L    Anion Gap 18 (H) 9 - 17 mmol/L    GFR Non-African American >60 >60 mL/min    GFR African American >60 >60 mL/min    GFR Comment          GFR Staging NOT REPORTED    HCG Qualitative, Serum   Result Value Ref Range    hCG Qual NEGATIVE NEGATIVE   Levetiracetam Level   Result Value Ref Range    Levetiracetam Lvl <2 ug/mL   Urinalysis Reflex to Culture   Result Value Ref Range    Color, UA YELLOW YELLOW    Turbidity UA CLEAR CLEAR    Glucose, Ur NEGATIVE NEGATIVE    Bilirubin Urine NEGATIVE NEGATIVE    Ketones, Urine TRACE (A) NEGATIVE    Specific Gravity, UA 1.019 1.005 - 1.030    Urine Hgb TRACE (A) NEGATIVE    pH, UA 5.5 5.0 - 8.0    Protein, UA NEGATIVE NEGATIVE    Urobilinogen, Urine Normal Normal    Nitrite, Urine NEGATIVE NEGATIVE    Leukocyte Esterase, Urine TRACE (A) NEGATIVE    Urinalysis Comments NOT REPORTED    CBC Auto Differential   Result Value Ref Range    WBC 12.5 (H) 3.5 - 11.3 k/uL    RBC 4.55 3.95 - 5.11 m/uL    Hemoglobin 14.2 11.9 - 15.1 g/dL    Hematocrit 44.1 36.3 - 47.1 %    MCV 96.9 82.6 - 102.9 fL    MCH 31.2 25.2 - 33.5 pg    MCHC 32.2 28.4 - 34.8 g/dL    RDW 12.5 11.8 - 14.4 %    Platelets 354 261 - 683 k/uL    MPV 10.6 8.1 - 13.5 fL    NRBC Automated 0.0 0.0 per 100 WBC    Differential Type NOT REPORTED     Seg Neutrophils 68 (H) 36 - 65 %    Lymphocytes 24 24 - 43 %    Monocytes 6 3 - 12 %    Eosinophils % 1 1 - 4 %    Basophils 0 0 - 2 %    Immature Granulocytes 1 (H) 0 %    Segs Absolute 8.53 (H) 1.50 - 8.10 k/uL    Absolute Lymph # 2.99 1.10 - 3.70 k/uL    Absolute Mono # 0.80 0.10 - 1.20 k/uL    Absolute Eos # 0.11 0.00 - 0.44 k/uL    Basophils # 0.03 0.00 - 0.20 k/uL    Absolute Immature Granulocyte 0.07 0.00 - 0.30 k/uL    WBC Morphology NOT REPORTED     RBC Morphology NOT REPORTED     Platelet Estimate NOT REPORTED    Microscopic Urinalysis   Result Value Ref Range    -          WBC, UA 2 TO 5 0 - 5 /HPF    RBC, UA 0 TO 2 0 - 4 /HPF    Casts UA  0 - 8 /LPF     0 TO 2 HYALINE Reference range defined for non-centrifuged specimen. Crystals UA NOT REPORTED None /HPF    Epithelial Cells UA 0 TO 2 0 - 5 /HPF    Renal Epithelial, Urine NOT REPORTED 0 /HPF    Bacteria, UA NOT REPORTED None    Mucus, UA NOT REPORTED None    Trichomonas, UA NOT REPORTED None    Amorphous, UA NOT REPORTED None    Other Observations UA NOT REPORTED NOT REQ.     Yeast, UA NOT REPORTED None       IMPRESSION: Patient presents with seizure. Has a known history of seizure, supposed be on Keppra. However, patient apparently has been missing doses. Seizures could be due to subtherapeutic doses. Patient is currently at her baseline. She is neurologically intact with no gross motor sensory deficit. She does have some evidence of tongue biting. Given her presentation, we'll plan to obtain a Keppra level, basic labs, reassess. RADIOLOGY:  No results found. EKG  None    All EKG's are interpreted by the Emergency Department Physician who either signs or Co-signs this chart in the absence of a cardiologist.    EMERGENCY DEPARTMENT COURSE:  3:05 AM  After walking out of the patient's room, she had another seizure. Had tonic-clonic shaking with tongue biting. Nursing staff quickly stabilized the patient, placed her on a non-rebreather and she was given 2 mg of Ativan. Seizure stopped, now post-ictal.    4:28 AM  Spoke with neurology team, who is agreeable with plans for admission. Patient reassessed and found to be sleeping on cot. PROCEDURES:  None    CONSULTS:  IP CONSULT TO NEUROLOGY    CRITICAL CARE:  None    FINAL IMPRESSION      1.  Seizure [facility])          DISPOSITION / PLAN     DISPOSITION Admitted 04/24/2019 04:28:02 AM      PATIENT REFERRED TO:  Sima Antonio MD  [facility] [address] [phone]            DISCHARGE MEDICATIONS:  Current Discharge Medication List          Kalli Kemp MD  Emergency Medicine Resident    (Please note that portions of thisnote were completed with a voice recognition program.  Efforts were made to edit the dictations but occasionally words are mis-transcribed.)       Serina Holter, MD  Resident  04/24/19 4103

## 2019-04-24 NOTE — ED TRIAGE NOTES
Pt states that she has had seizures since she was 19 but generally only has one per week, today she had two consecutive seizures so her bf called 911. Still postictal for EMS per report, AAOx4 once she arrived here. Pt states the forgot to take her keppra last night. Side rails padded, suction set up at bedside. Will continue to monitor.

## 2019-04-24 NOTE — H&P
Department of Neurology                                         Resident H&P        CHIEF COMPLAINT:       seizures    HISTORY OF PRESENT ILLNESS:       The patient is a 25 y.o. female who presented on 04/24/19 with 3 seizures. She was sleeping in her bed and was noted to have a seizure,witnessed by the boyfriend, duration unknown, she had another seizure 30 minutes after and 1 witnessed tonic-clonic seizure in the ER. She is on keppra,is non compliant. keppra level in the ER <2.  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 Patient was sleepy. History was obtained from staff and chart review. No family at bedside. First seizure ;23years old,she had nocturnal seizures,tonic-clonic. 3-4 episodes/year. She was started on keppra about 4-5 years ago. she was last in the hospital for seizure in oct. 2018. At that time also she reported that she did nottake her morning dose of keppra and her keppra level was<2.             PAST MEDICAL HISTORY :       Past Medical History:        Diagnosis Date    Anxiety     Seizures ([facility] [address]        Past Surgical History:        Procedure Laterality Date    BRAIN SURGERY         Social History:   Social History     Socioeconomic History    Marital status: Single     Spouse name: Not on file    Number of children: Not on file    Years of education: Not on file    Highest education level: Not on file   Occupational History    Not on file   Social Needs    Financial resource strain: Not on file    Food insecurity:     Worry: Not on file     Inability: Not on file    Transportation needs:     Medical: Not on file     Non-medical: Not on file   Tobacco Use    Smoking status: Current Every Day Smoker     Packs/day: 0.50     Types: Cigarettes    Smokeless tobacco: Current User   Substance and Sexual Activity    Alcohol use: No    Drug use: Yes     Types: Marijuana     Comment: daily    Sexual activity: Not on file   Lifestyle    Physical activity:     Days per week: Not on file     Minutes per session: Not on file    Stress: Not on file   Relationships    Social connections:     Talks on phone: Not on file     Gets together: Not on file     Attends [employer] service: Not on file     Active member of club or organization: Not on file     Attends meetings of clubs or organizations: Not on file     Relationship status: Not on file    Intimate partner violence:     Fear of current or ex partner: Not on file     Emotionally abused: Not on file     Physically abused: Not on file     Forced sexual activity: Not on file   Other Topics Concern    Not on file   Social History Narrative    Not on file       Family History:       Problem Relation Age of Onset    Asthma Mother     Depression Mother     High Blood Pressure Mother        Allergies:  Patient has no known allergies. Home Medications:  Prior to Admission medications    Medication Sig Start Date End Date Taking? Authorizing Provider   ondansetron (ZOFRAN) 4 MG tablet Take 1 tablet by mouth 3 times daily as needed for Nausea or Vomiting 3/24/19   Pastor Bee MD   dicyclomine (BENTYL) 10 MG capsule Take 1 capsule by mouth daily as needed (for abdominal pain) 3/24/19   Jami Guallpa MD   levETIRAcetam (KEPPRA) 500 MG tablet take 1 tablet by mouth twice a day 3/5/19   Td Minaya MD   albuterol sulfate HFA (VENTOLIN HFA) 108 (90 Base) MCG/ACT inhaler Inhale 2 puffs into the lungs every 6 hours as needed for Wheezing 10/24/18   Td Minaya MD   cetirizine (ZYRTEC ALLERGY) 10 MG tablet Take 1 tablet by mouth daily 10/24/18   Td Minaya MD   sertraline (ZOLOFT) 25 MG tablet Take 1 tablet by mouth daily 3/28/18   Merced Gleason MD       Current Medications:   No current facility-administered medications for this encounter.      REVIEW OF SYSTEMS: CONSTITUTIONAL: negative for fatigue and malaise   EYES: negative for double vision and photophobia    HEENT: negative for tinnitus and sore throat   RESPIRATORY: negative for cough, shortness of breath   CARDIOVASCULAR: negative for chest pain, palpitations   GASTROINTESTINAL: negative for nausea, vomiting   GENITOURINARY: negative for incontinence   MUSCULOSKELETAL: negative for neck or back pain   NEUROLOGICAL: negative for seizures   PSYCHIATRIC: negative for fatigue     Review of systems otherwise negative. PHYSICAL EXAM:       BP 91/69   Pulse 100   Temp 98 °F (36.7 °C) (Oral)   Resp 18   Ht 5' (1.524 m)   Wt 149 lb (67.6 kg)   SpO2 100%   BMI 29.10 kg/m²     CONSTITUTIONAL:  Well developed, well nourished, alert and oriented x 3, in no acute distress. GCS 15, nontoxic. + dysarthria(due to tongue biting), no aphasia. EOMI.     HEAD:  normocephalic, atraumatic    EYES:  PERRLA, EOMI.   ENT:  moist mucous membranes   NECK:  supple, symmetric, no midline tenderness to palpation    BACK:  without midline tenderness, step-offs or deformities    LUNGS:  Equal air entry bilaterally   CARDIOVASCULAR:  normal s1 / s2   ABDOMEN:  Soft, no rigidity   NEUROLOGIC:    Mental status   Alert and oriented; intact memory with no confusion,slurred speech due to tongue biting; no hallucinations or delusions     Cranial nerves   II - visual fields intact to confrontation                                                III, IV, VI - extra-ocular muscles full: no pupillary defect; no HOLLY, no nystagmus, no ptosis                                                                      V - normal facial sensation                                                               VII - normal facial symmetry                                                             VIII - intact hearing                                                                             IX, X - symmetrical palate XI - symmetrical shoulder shrug                                                       XII - midline tongue without atrophy or fasciculation     Motor function  Normal muscle bulk and tone; normal power 4+/5     Sensory function Intact to touch,proprioception     Cerebellar  No involuntary movements or tremors     Reflex function Intact 1+ DTR and symmetric with no pathologic reflex or Babinski sign     Gait                  Not tested             INITIAL NIH STROKE SCALE:    Time Performed:  05:30 AM     1a. Level of consciousness:  0 - alert; keenly responsive  1b. Level of consciousness questions:  0 - answers both questions correctly  1c. Level of consciousness questions:  0 - performs both tasks correctly  2. Best Gaze:  0 - normal  3. Visual:  0 - no visual loss  4. Facial Palsy:  0 - normal symmetric movement  5a. Motor left arm:  0 - no drift, limb holds 90 (or 45) degrees for full 10 seconds  5b. Motor right arm:  0 - no drift, limb holds 90 (or 45) degrees for full 10 seconds  6a. Motor left le - no drift; leg holds 30 degree position for full 5 seconds  6b. Motor right le - no drift; leg holds 30 degree position for full 5 seconds  7. Limb Ataxia:  0 - absent  8. Sensory:  0 - normal; no sensory loss  9. Best Language:  0 - no aphasia, normal  10. Dysarthria:  1 - mild to moderate, patient slurs at least some words and at worst, can be understood with some difficulty  11.   Extinction and Inattention:  0 - no abnormality    TOTAL:  1     SKIN:  no rash      LABS AND IMAGING:     CBC with Differential:    Lab Results   Component Value Date    WBC 12.5 2019    RBC 4.55 2019    HGB 14.2 2019    HCT 44.1 2019     2019    MCV 96.9 2019    MCH 31.2 2019    MCHC 32.2 2019    RDW 12.5 2019    LYMPHOPCT 24 2019    MONOPCT 6 2019    BASOPCT 0 2019    MONOSABS 0.80 2019 LYMPHSABS 2.99 04/24/2019    EOSABS 0.11 04/24/2019    BASOSABS 0.03 04/24/2019    DIFFTYPE NOT REPORTED 04/24/2019     BMP:    Lab Results   Component Value Date     04/24/2019    K 3.7 04/24/2019     04/24/2019    CO2 16 04/24/2019    BUN 10 04/24/2019    LABALBU 3.9 11/10/2013    CREATININE 1.05 04/24/2019    CALCIUM 8.9 04/24/2019    GFRAA >60 04/24/2019    LABGLOM >60 04/24/2019    GLUCOSE 94 04/24/2019       Radiology Review:  As above      ASSESSMENT AND PLAN:       Patient Active Problem List   Diagnosis    Encounter to establish care    Seizure disorder ([address]    Trying to get pregnant    Otalgia    Acute suppurative otitis media of left ear without spontaneous rupture of tympanic membrane    Breakthrough seizure ([address]    Bacterial vaginosis    Urinary tract infection without hematuria    Seizure ([address]       25 y.o. female who presents with seizures,PMHx of L temporal lobe mass s/p resection.       - MRI Brain  w WO   - s/p keppra 1 g             -cont. keppra 500 mg bid             -seizures precaution            -ativan 1 mg prn for seizures lasting >1 minute,max 4 mg/day. - Fasting Lipid panel   - PT, OT, Speech eval    - Hydrate with  IVF NS @ 75cc/hr    - Telemetry    - Neuro checks per protocol  - We recommend SBP normotension  - Blood glucose goal less than 180  - Please avoid dextrose containing solutions    Additional recommendations may follow    Please contact neurology with any changes in patients neurologic status. Thank you for your consult.        Misha Acosta MD   4/24/2019  5:36 AM

## 2019-04-24 NOTE — PROGRESS NOTES
Patient states she is unable to swallow any pills/tabs. Meds slurried in water and pt took. Dr. Delmar Holliday notified.

## 2019-04-24 NOTE — PROGRESS NOTES
Prior to arrival nurse set up room for seizure precautions including continuous telemetry and pulse ox monitoring,  telesitter monitoring, bed fellows, oxygen and tubing suction for safety. Patient arrived on unit very weak and tired. Patient very somnolant during completion of head to toe assessment, and also through admission history questions. Nurse will attempt admission history questions when patient is more alert. Physician at bedside for assessment and stated will put in more orders including Keppra and seizure precautions. Nurse will continue to monitor closely.

## 2019-04-24 NOTE — ED NOTES
Boyfriend at bedside states the patient regularly skips her medication. Patients mother has also \"been on her\" about it.       Ruth Bernardo RN  04/24/19 [id number]

## 2019-04-24 NOTE — PROGRESS NOTES
Smoking Cessation - topics covered   [x]  Health Risks  [x]  Benefits of Quitting   [x]  Smoking Cessation  []  Patient has no history of tobacco use  []  Patient is former smoker. []  No need for tobacco cessation education. [x]  Booklet given  [x]  Patient verbalizes understanding. []  Patient denies need for tobacco cessation education. []  Unable to meet with patient today. Will follow up as able.   BA HINES  1:32 PM

## 2019-04-24 NOTE — CARE COORDINATION
Case Management Initial Discharge Plan  Ning Willson,             Met with:patient to discuss discharge plans. Information verified: address, contacts, phone number, , insurance Yes  PCP: Tracey Wan MD  Date of last visit: 19    Insurance Provider: [employer]    Discharge Planning    Living Arrangements:  Family Members   Support Systems:  Spouse/Significant Other, Parent    Home has 1 stories  6 stairs to climb to get into front door, stairs to climb to reach second floor  Location of bedroom/bathroom in home     Patient able to perform ADL's:Independent    Current Services (outpatient & in home) none  DME equipment: none  DME provider:     Pharmacy: [facility] Medications:     Does patient want to participate in local refill/ meds to beds program?       Potential Assistance Needed:  N/A    Patient agreeable to home care: n/a  Freedom of choice provided:  n/a    Prior SNF/Rehab Placement and Facility: n/a  Agreeable to SNF/Rehab: n/a  [place] of choice provided: n/a   Evaluation: no    Expected Discharge date:     Patient expects to be discharged to:  home  Follow Up Appointment: Best Day/ Time:      Transportation provider: mom  Transportation arrangements needed for discharge: No    Readmission Risk              Risk of Unplanned Readmission:        10             Does patient have a readmission risk score greater than 14?: No  If yes, follow-up appointment must be made within 7 days of discharge. Discharge Plan: Home independently w/support from mother and significant other. Pt stays at significant other's home frequently but mother's address is home. Pt will need return to work note from physician.           Electronically signed by Goldie Valerio RN on 19 at 3:51 PM

## 2019-04-24 NOTE — LETTER
[facility]  [address] [address]  Phone: [phone]             April 25, 2019    Patient: Lilian Manriquez   YOB: 1995   Date of Visit: 4/24/2019       To Whom It May Concern:    Alexia Martinez was seen and treated in our facility  beginning 4/24/2019 until 4/25/2019. She was treated for breakthrough seizures. She may return to work on 4/26/2019.       Sincerely,       Rina Moreno DO         Signature:__________________________________

## 2019-04-25 VITALS
WEIGHT: 149 LBS | OXYGEN SATURATION: 100 % | RESPIRATION RATE: 12 BRPM | TEMPERATURE: 98.3 F | DIASTOLIC BLOOD PRESSURE: 62 MMHG | HEIGHT: 60 IN | HEART RATE: 84 BPM | BODY MASS INDEX: 29.25 KG/M2 | SYSTOLIC BLOOD PRESSURE: 108 MMHG

## 2019-04-25 LAB
ABSOLUTE EOS #: 0.12 K/UL (ref 0–0.44)
ABSOLUTE IMMATURE GRANULOCYTE: 0.03 K/UL (ref 0–0.3)
ABSOLUTE LYMPH #: 3.67 K/UL (ref 1.1–3.7)
ABSOLUTE MONO #: 0.83 K/UL (ref 0.1–1.2)
ANION GAP SERPL CALCULATED.3IONS-SCNC: 10 MMOL/L (ref 9–17)
BASOPHILS # BLD: 0 % (ref 0–2)
BASOPHILS ABSOLUTE: 0.04 K/UL (ref 0–0.2)
BUN BLDV-MCNC: 7 MG/DL (ref 6–20)
BUN/CREAT BLD: ABNORMAL (ref 9–20)
CALCIUM SERPL-MCNC: 8.2 MG/DL (ref 8.6–10.4)
CHLORIDE BLD-SCNC: 107 MMOL/L (ref 98–107)
CO2: 20 MMOL/L (ref 20–31)
CREAT SERPL-MCNC: 0.8 MG/DL (ref 0.5–0.9)
DIFFERENTIAL TYPE: NORMAL
EOSINOPHILS RELATIVE PERCENT: 1 % (ref 1–4)
GFR AFRICAN AMERICAN: >60 ML/MIN
GFR NON-AFRICAN AMERICAN: >60 ML/MIN
GFR SERPL CREATININE-BSD FRML MDRD: ABNORMAL ML/MIN/{1.73_M2}
GFR SERPL CREATININE-BSD FRML MDRD: ABNORMAL ML/MIN/{1.73_M2}
GLUCOSE BLD-MCNC: 89 MG/DL (ref 70–99)
HCT VFR BLD CALC: 40 % (ref 36.3–47.1)
HEMOGLOBIN: 13.2 G/DL (ref 11.9–15.1)
IMMATURE GRANULOCYTES: 0 %
LYMPHOCYTES # BLD: 38 % (ref 24–43)
MCH RBC QN AUTO: 31.3 PG (ref 25.2–33.5)
MCHC RBC AUTO-ENTMCNC: 33 G/DL (ref 28.4–34.8)
MCV RBC AUTO: 94.8 FL (ref 82.6–102.9)
MONOCYTES # BLD: 9 % (ref 3–12)
NRBC AUTOMATED: 0 PER 100 WBC
PDW BLD-RTO: 12.5 % (ref 11.8–14.4)
PLATELET # BLD: 205 K/UL (ref 138–453)
PLATELET ESTIMATE: NORMAL
PMV BLD AUTO: 10.6 FL (ref 8.1–13.5)
POTASSIUM SERPL-SCNC: 3.6 MMOL/L (ref 3.7–5.3)
RBC # BLD: 4.22 M/UL (ref 3.95–5.11)
RBC # BLD: NORMAL 10*6/UL
SEG NEUTROPHILS: 52 % (ref 36–65)
SEGMENTED NEUTROPHILS ABSOLUTE COUNT: 5.07 K/UL (ref 1.5–8.1)
SODIUM BLD-SCNC: 137 MMOL/L (ref 135–144)
WBC # BLD: 9.8 K/UL (ref 3.5–11.3)
WBC # BLD: NORMAL 10*3/UL

## 2019-04-25 PROCEDURE — 6370000000 HC RX 637 (ALT 250 FOR IP): Performed by: FAMILY MEDICINE

## 2019-04-25 PROCEDURE — 85025 COMPLETE CBC W/AUTO DIFF WBC: CPT

## 2019-04-25 PROCEDURE — 99239 HOSP IP/OBS DSCHRG MGMT >30: CPT | Performed by: PSYCHIATRY & NEUROLOGY

## 2019-04-25 PROCEDURE — 80048 BASIC METABOLIC PNL TOTAL CA: CPT

## 2019-04-25 PROCEDURE — 2580000003 HC RX 258: Performed by: FAMILY MEDICINE

## 2019-04-25 PROCEDURE — 6360000002 HC RX W HCPCS: Performed by: FAMILY MEDICINE

## 2019-04-25 PROCEDURE — 6370000000 HC RX 637 (ALT 250 FOR IP): Performed by: PSYCHIATRY & NEUROLOGY

## 2019-04-25 PROCEDURE — 36415 COLL VENOUS BLD VENIPUNCTURE: CPT

## 2019-04-25 RX ORDER — LEVETIRACETAM 100 MG/ML
500 SOLUTION ORAL 2 TIMES DAILY
Qty: 300 ML | Refills: 3 | Status: SHIPPED | OUTPATIENT
Start: 2019-04-25 | End: 2019-07-12 | Stop reason: SDUPTHER

## 2019-04-25 RX ADMIN — LEVETIRACETAM 500 MG: 100 SOLUTION ORAL at 09:27

## 2019-04-25 RX ADMIN — Medication 10 ML: at 09:32

## 2019-04-25 RX ADMIN — CETIRIZINE HYDROCHLORIDE 10 MG: 10 TABLET ORAL at 09:27

## 2019-04-25 RX ADMIN — SERTRALINE 25 MG: 50 TABLET, FILM COATED ORAL at 09:31

## 2019-04-25 ASSESSMENT — PAIN SCALES - GENERAL: PAINLEVEL_OUTOF10: 0

## 2019-04-25 NOTE — CARE COORDINATION
Discharge [facility] Case Management Department  Written by: Saad Burk RN    Patient Name: Alireza Pino  Attending Provider: Archie Guy DO  Admit Date: 2019  2:38 AM  MRN: [id number]  Account: [de-identified]                     : 1995  Discharge Date: 19      Disposition: home    Saad Burk RN

## 2019-04-25 NOTE — PROGRESS NOTES
Physical Therapy  DATE: 2019    NAME: Ashwin Hernandez  MRN: [id number]   : 1995    Patient not seen this date for Physical Therapy due to:  [] Blood transfusion in progress  [] Hemodialysis  []  Patient Declined  [] Spine Precautions   [] Strict Bedrest  [] Surgery/ Procedure  [] Testing      [] Other        [x] PT being discontinued at this time. Patient independently ambulating in the hallways. No further acute PT needs. [] PT being discontinued at this time as the patient has been transferred to palliative care. No further needs.     Lenora Dhaliwal, PT

## 2019-04-25 NOTE — DISCHARGE SUMMARY
[facility] Neurology  [address] [facility]    Discharge Summary     Patient ID: Radha Peters  :  1995   MRN: [id number]     ACCOUNT:  [de-identified]   Patient's PCP: Jim Jacobo MD  Admit Date: 2019   Discharge Date: 2019   Length of Stay: 1  Code Status:  Full Code  Admitting Physician: Brook Orozco DO  Discharge Physician: Brook Orozco DO     Active Discharge Diagnoses:     Hospital Problem Lists:  Active Problems:    Seizure ([address]    Localization-related epilepsy ([address]    History of brain surgery  Resolved Problems:    * No resolved hospital problems. *      Admission Condition:  good     Discharged Condition: good    Hospital Stay:     Hospital Course:  Radha Peters is a 25 y.o. female who was admitted for the management of breakthrough seizures. Patient has history of localization-related epilepsy, status post left temporal lobe mass resection in . She is supposed to be on Keppra 500 mg twice a day which she has been noncompliant with due to difficulty in swallowing oral pills. She follows with [facility] neurology. She presented with 3 nocturnal seizures. Levetiracetam level is undetectable in ED. Patient admitted for observation. Updated MRI brain was performed which showed evidence of left temporal gliosis and lobectomy without any residual mass or abnormal enhancement. Patient was watched for over 24 hours and was stable without any further seizures. She was instructed to perform an EEG as an outpatient and follow up with her neurologist. Her Keppra was switched from tablet form to liquid formulation which she was able to tolerate and requested to stay on. Patient was stable for discharge on this day.     Significant therapeutic interventions: Ativan, Keppra    Significant Diagnostic Studies:   Labs / Micro:  Levetiracetam level < 2    Radiology:    Xr Chest Portable    Result Date: 2019  EXAMINATION: SINGLE XRAY VIEW OF THE CHEST 4/24/2019 7:03 am COMPARISON: None HISTORY: ORDERING SYSTEM PROVIDED HISTORY: seizure TECHNOLOGIST PROVIDED HISTORY: seizure FINDINGS: Low lung volumes. No focal consolidation. No cardiomegaly. No acute pulmonary process. Mri Brain With And Without Contrast    Result Date: 4/24/2019  EXAMINATION: MRI OF THE BRAIN WITHOUT AND WITH CONTRAST  4/24/2019 2:33 pm TECHNIQUE: Multiplanar multisequence MRI of the head/brain was performed without and with the administration of intravenous contrast. COMPARISON: CT head 05/11/2015 HISTORY: ORDERING SYSTEM PROVIDED HISTORY: left temporal mass ,s/p resection,seizures FINDINGS: INTRACRANIAL STRUCTURES/VENTRICLES:  There are changes of interval partial left temporal lobectomy with mild T2/FLAIR hyperintensity surrounding the resection cavity. No mass is seen. There is no acute infarct. No mass effect or midline shift. No evidence of an acute intracranial hemorrhage. The ventricles and sulci are normal in size and configuration. The sellar/suprasellar regions appear unremarkable. The normal signal voids within the major intracranial vessels appear maintained. No abnormal focus of enhancement is seen within the brain. ORBITS: The visualized portion of the orbits demonstrate no acute abnormality. SINUSES: The visualized paranasal sinuses and mastoid air cells are well aerated. BONES/SOFT TISSUES: Changes of interval left temporal craniotomy. Marrow signal is within normal limits. No acute soft tissue abnormality is seen. Changes of partial left temporal lobectomy with mild gliosis surrounding the resection cavity. No residual mass or abnormal enhancement. Consultations:    Consults:     Final Specialist Recommendations/Findings:   IP CONSULT TO NEUROLOGY      The patient was seen and examined on day of discharge and this discharge summary is in conjunction with any daily progress note from day of discharge.     Discharge plan:     Disposition: Home    Physician Follow Up:     [facility] [address] [phone]    Schedule an appointment as soon as possible for a visit  always recommended to follow up with your PCP in the next few weeks to update on hospitalizationj    [facility]  [phone]  In 2 weeks  seizure hospital follow up. Requiring Further Evaluation/Follow Up POST HOSPITALIZATION/Incidental Findings:     Diet: regular diet    Activity: As tolerated    Instructions to Patient: No driving for 6 months.     Discharge Medications:      Medication List      START taking these medications    levETIRAcetam 100 MG/ML solution  Commonly known as:  KEPPRA  Take 5 mLs by mouth 2 times daily  Replaces:  levETIRAcetam 500 MG tablet        CONTINUE taking these medications    albuterol sulfate  (90 Base) MCG/ACT inhaler  Commonly known as:  VENTOLIN HFA  Inhale 2 puffs into the lungs every 6 hours as needed for Wheezing     cetirizine 10 MG tablet  Commonly known as:  ZYRTEC ALLERGY  Take 1 tablet by mouth daily     dicyclomine 10 MG capsule  Commonly known as:  BENTYL  Take 1 capsule by mouth daily as needed (for abdominal pain)     ondansetron 4 MG tablet  Commonly known as:  ZOFRAN  Take 1 tablet by mouth 3 times daily as needed for Nausea or Vomiting     sertraline 25 MG tablet  Commonly known as:  ZOLOFT  Take 1 tablet by mouth daily        STOP taking these medications    levETIRAcetam 500 MG tablet  Commonly known as:  KEPPRA  Replaced by:  levETIRAcetam 100 MG/ML solution           Where to Get Your Medications      These medications were sent to [address] [place] [address]    Phone:  [phone]   · levETIRAcetam 100 MG/ML solution         Time Spent on discharge is  32 mins in patient examination, evaluation, counseling as well as medication reconciliation, prescriptions for required medications, discharge plan and follow up. Electronically signed by   Facundo Bonilla DO  4/25/2019  9:35 AM      Thank you Dr. Erik Hoffman MD for the opportunity to be involved in this patient's care.

## 2019-06-26 ENCOUNTER — HOSPITAL ENCOUNTER (EMERGENCY)
Age: 24
Discharge: HOME | End: 2019-06-26
Attending: EMERGENCY MEDICINE

## 2019-06-26 VITALS
SYSTOLIC BLOOD PRESSURE: 118 MMHG | TEMPERATURE: 100.4 F | DIASTOLIC BLOOD PRESSURE: 84 MMHG | WEIGHT: 149 LBS | HEART RATE: 110 BPM | RESPIRATION RATE: 16 BRPM | OXYGEN SATURATION: 99 % | BODY MASS INDEX: 27.42 KG/M2 | HEIGHT: 62 IN

## 2019-06-26 DIAGNOSIS — J02.9 ACUTE PHARYNGITIS, UNSPECIFIED ETIOLOGY: Primary | ICD-10-CM

## 2019-06-26 LAB
DIRECT EXAM: NORMAL
Lab: NORMAL
SPECIMEN DESCRIPTION: NORMAL

## 2019-06-26 PROCEDURE — 96372 THER/PROPH/DIAG INJ SC/IM: CPT

## 2019-06-26 PROCEDURE — 99283 EMERGENCY DEPT VISIT LOW MDM: CPT

## 2019-06-26 PROCEDURE — 87880 STREP A ASSAY W/OPTIC: CPT

## 2019-06-26 PROCEDURE — 6360000002 HC RX W HCPCS: Performed by: STUDENT IN AN ORGANIZED HEALTH CARE EDUCATION/TRAINING PROGRAM

## 2019-06-26 RX ORDER — DEXAMETHASONE SODIUM PHOSPHATE 10 MG/ML
10 INJECTION INTRAMUSCULAR; INTRAVENOUS ONCE
Status: COMPLETED | OUTPATIENT
Start: 2019-06-26 | End: 2019-06-26

## 2019-06-26 RX ADMIN — PENICILLIN G BENZATHINE AND PENICILLIN G PROCAINE 1.2 MILLION UNITS: 600000; 600000 INJECTION, SUSPENSION INTRAMUSCULAR at 16:33

## 2019-06-26 RX ADMIN — DEXAMETHASONE SODIUM PHOSPHATE 10 MG: 10 INJECTION INTRAMUSCULAR; INTRAVENOUS at 16:40

## 2019-06-26 ASSESSMENT — PAIN DESCRIPTION - FREQUENCY: FREQUENCY: CONTINUOUS

## 2019-06-26 ASSESSMENT — ENCOUNTER SYMPTOMS
ABDOMINAL PAIN: 0
SORE THROAT: 1
VOMITING: 0
NAUSEA: 0
COUGH: 0
ABDOMINAL DISTENTION: 0
TROUBLE SWALLOWING: 1
WHEEZING: 0
VOICE CHANGE: 1
BACK PAIN: 0
SHORTNESS OF BREATH: 0

## 2019-06-26 ASSESSMENT — PAIN DESCRIPTION - PAIN TYPE: TYPE: ACUTE PAIN

## 2019-06-26 ASSESSMENT — PAIN DESCRIPTION - LOCATION: LOCATION: THROAT

## 2019-06-26 ASSESSMENT — PAIN SCALES - GENERAL: PAINLEVEL_OUTOF10: 8

## 2019-06-26 ASSESSMENT — PAIN DESCRIPTION - DESCRIPTORS: DESCRIPTORS: SORE

## 2019-06-26 NOTE — ED PROVIDER NOTES
[facility]  Emergency Department Encounter  Emergency Medicine Resident     Pt Name: Koko Richard  MRN: [id number]  [place] 1995  Date of evaluation: 6/26/19  PCP:  Portia Davison MD    CHIEF COMPLAINT       Chief Complaint   Patient presents with    Pharyngitis       HISTORY OF PRESENT ILLNESS  (Location/Symptom, Timing/Onset, Context/Setting, Quality, Duration, Modifying Factors, Severity.)    Koko Richard is a 25 y.o. female who presents with sore throat. Patient states that her throat began to hurt yesterday. Patient states that she has a long-standing history of strep throat. Also states that her dentist wants to know if she has another episode of strep throat as she will likely need her tonsils removed. Patient states that her voice feels different but she is able to speak and she is able to swallow fluids. Patient endorses some odontophagia with solids. Reports no odynophagia with fluids. Denies any fever or chills at home. Denies any nausea or vomiting. Denies any abdominal pain. Denies any tongue or subungual pain. Denies any submandibular pain. Denies any neck rigidity and denies any difficulty in turning her head. PAST MEDICAL / SURGICAL / SOCIAL / FAMILY HISTORY    has a past medical history of Anxiety and Seizures ([facility] [address] has a past surgical history that includes brain surgery.     Social History     Socioeconomic History    Marital status: Single     Spouse name: Not on file    Number of children: Not on file    Years of education: Not on file    Highest education level: Not on file   Occupational History    Not on file   Social Needs    Financial resource strain: Not on file    Food insecurity:     Worry: Not on file     Inability: Not on file    Transportation needs:     Medical: Not on file     Non-medical: Not on file   Tobacco Use    Smoking status: Current Every Day Smoker     Packs/day: 0.50     Types: Cigarettes    Smokeless tobacco: Current User   Substance and Sexual Activity    Alcohol use: No    Drug use: Yes     Types: Marijuana     Comment: daily    Sexual activity: Not on file   Lifestyle    Physical activity:     Days per week: Not on file     Minutes per session: Not on file    Stress: Not on file   Relationships    Social connections:     Talks on phone: Not on file     Gets together: Not on file     Attends Anabaptism service: Not on file     Active member of club or organization: Not on file     Attends meetings of clubs or organizations: Not on file     Relationship status: Not on file    Intimate partner violence:     Fear of current or ex partner: Not on file     Emotionally abused: Not on file     Physically abused: Not on file     Forced sexual activity: Not on file   Other Topics Concern    Not on file   Social History Narrative    Not on file       Family History   Problem Relation Age of Onset    Asthma Mother     Depression Mother     High Blood Pressure Mother        Allergies:    Patient has no known allergies. Home Medications:  Prior to Admission medications    Medication Sig Start Date End Date Taking?  Authorizing Provider   levETIRAcetam (KEPPRA) 100 MG/ML solution Take 5 mLs by mouth 2 times daily 4/25/19   Segundo Rice DO   ondansetron (ZOFRAN) 4 MG tablet Take 1 tablet by mouth 3 times daily as needed for Nausea or Vomiting 3/24/19   Pastor Frye MD   dicyclomine (BENTYL) 10 MG capsule Take 1 capsule by mouth daily as needed (for abdominal pain) 3/24/19   Pastor Frye MD   albuterol sulfate HFA (VENTOLIN HFA) 108 (90 Base) MCG/ACT inhaler Inhale 2 puffs into the lungs every 6 hours as needed for Wheezing 10/24/18   Warfield Space, MD   cetirizine (ZYRTEC ALLERGY) 10 MG tablet Take 1 tablet by mouth daily 10/24/18   Katherine Quevedo MD   sertraline (ZOLOFT) 25 MG tablet Take 1 tablet by mouth daily 3/28/18   Eliezer Hagan MD       REVIEW OF SYSTEMS    (2-9 systems for level 4, 10 or more for level 2+ on the left side. Pulmonary/Chest: Effort normal and breath sounds normal. No respiratory distress. She has no decreased breath sounds. Abdominal: Soft. Bowel sounds are normal. She exhibits no distension. There is no tenderness. Neurological: She is alert and oriented to person, place, and time. Skin: Skin is warm and dry. Capillary refill takes less than 2 seconds. Nursing note and vitals reviewed. DIFFERENTIAL  DIAGNOSIS   PLAN (LABS / IMAGING / EKG):  Orders Placed This Encounter   Procedures    STREP SCREEN GROUP A THROAT       MEDICATIONS ORDERED:  Orders Placed This Encounter   Medications    penicillin G benzathine and procaine (BICILLIN C-R) [id number] UNIT per 2ML suspension 1.2 Million Units    dexamethasone (DECADRON) injection 10 mg       DDX:   Epiglottitis, peritonsilar abscess, strep pharyngitis, uvulitis, post-nasal drip, GC/Chl, viral pharyngitis, dental abscess, hand-foot-mouth disease, herpetic stomatitis, other viral infections    Evaluate for: fever, cough, abdominal pain, exudates, uvula midline, voice changes, tender cervical nodes, drooling. DIAGNOSTIC RESULTS / EMERGENCYDEPARTMENT COURSE / MDM   LABS:  Labs Reviewed   STREP SCREEN GROUP A THROAT       RADIOLOGY:  No results found. EKG    none    EMERGENCY DEPARTMENT COURSE:   Patient presented with sore throat. Significant pharyngeal edema with tonsillar exudate. Denied cough. Patient has a mild temperature of 100.4. Patient has a Centor score of 4. Given the patient has had a long-standing history of strep throat with significant symptoms, I decided to go ahead and treat patient with IM penicillin. Patient stated that she would like documentation of strep screening as her physician is keeping track of this as she may require tonsillectomy in the future if she continues to have multiple episodes of pharyngitis per year. Patient was also given IM Decadron. Rapid strep screen was negative.   Instructed patient to follow-up with her primary care provider. Instructed patient to return to the emergency room if symptoms worsened she had any difficulty swallowing or talking. Patient voiced understanding of these instructions. Patient stated that she began feeling slightly better and felt that she was able to swallow a little easier. MDM  Number of Diagnoses or Management Options  Acute pharyngitis, unspecified etiology: new, needed workup  Diagnosis management comments: Negative testing for strep. Treated with IM penicillin empirically. Testing was done so patient could have documentation. Patient to follow-up with her primary care provider or to return to the emergency room if symptoms worsen. Patient voiced understanding of these instructions. Amount and/or Complexity of Data Reviewed  Clinical lab tests: ordered and reviewed  Review and summarize past medical records: yes  Discuss the patient with other providers: yes  Independent visualization of images, tracings, or specimens: yes    Risk of Complications, Morbidity, and/or Mortality  Presenting problems: low  Diagnostic procedures: low  Management options: low    Patient Progress  Patient progress: improved      PROCEDURES:  none    CONSULTS:  None    CRITICAL CARE:  Please see attending note    FINAL IMPRESSION     1. Acute pharyngitis, unspecified etiology          DISPOSITION / PLAN   DISPOSITION Decision To Discharge 06/26/2019 05:08:29 PM      Evaluation and treatment course in the ED, and plan of care upon discharge was discussed in length with the patient. Patient had no further questions prior to being discharged and was instructed to return to the ED for new or worsening symptoms. Any changes to existing medications or new prescriptions were reviewed with patient and they expressed understanding of how to correctly take their medications and the possible side effects.     PATIENT REFERRED TO:  Sung Gibson MD  [facility]

## 2019-07-08 ENCOUNTER — TELEPHONE (OUTPATIENT)
Dept: FAMILY MEDICINE CLINIC | Age: 24
End: 2019-07-08

## 2019-07-12 ENCOUNTER — OFFICE VISIT (OUTPATIENT)
Dept: FAMILY MEDICINE CLINIC | Age: 24
End: 2019-07-12

## 2019-07-12 VITALS
TEMPERATURE: 98.2 F | BODY MASS INDEX: 28.85 KG/M2 | DIASTOLIC BLOOD PRESSURE: 78 MMHG | HEIGHT: 62 IN | HEART RATE: 85 BPM | WEIGHT: 156.8 LBS | SYSTOLIC BLOOD PRESSURE: 114 MMHG

## 2019-07-12 DIAGNOSIS — F41.9 ANXIETY: ICD-10-CM

## 2019-07-12 DIAGNOSIS — J30.9 ALLERGIC RHINITIS, UNSPECIFIED SEASONALITY, UNSPECIFIED TRIGGER: ICD-10-CM

## 2019-07-12 DIAGNOSIS — Z02.89 PHYSICAL EXAMINATION OF EMPLOYEE: Primary | ICD-10-CM

## 2019-07-12 DIAGNOSIS — J45.909 UNCOMPLICATED ASTHMA, UNSPECIFIED ASTHMA SEVERITY, UNSPECIFIED WHETHER PERSISTENT: ICD-10-CM

## 2019-07-12 DIAGNOSIS — G40.909 SEIZURE DISORDER (HCC): ICD-10-CM

## 2019-07-12 PROCEDURE — 90707 MMR VACCINE SC: CPT | Performed by: HOSPITALIST

## 2019-07-12 PROCEDURE — 99213 OFFICE O/P EST LOW 20 MIN: CPT | Performed by: STUDENT IN AN ORGANIZED HEALTH CARE EDUCATION/TRAINING PROGRAM

## 2019-07-12 PROCEDURE — 99211 OFF/OP EST MAY X REQ PHY/QHP: CPT | Performed by: STUDENT IN AN ORGANIZED HEALTH CARE EDUCATION/TRAINING PROGRAM

## 2019-07-12 RX ORDER — ALBUTEROL SULFATE 90 UG/1
2 AEROSOL, METERED RESPIRATORY (INHALATION) 2 TIMES DAILY
Qty: 1 INHALER | Refills: 3 | Status: SHIPPED
Start: 2019-07-12 | End: 2020-02-18 | Stop reason: SDUPTHER

## 2019-07-12 RX ORDER — LEVETIRACETAM 500 MG/1
500 TABLET ORAL
COMMUNITY
End: 2019-07-12

## 2019-07-12 RX ORDER — LEVETIRACETAM 100 MG/ML
500 SOLUTION ORAL 2 TIMES DAILY
Qty: 300 ML | Refills: 3 | Status: SHIPPED | OUTPATIENT
Start: 2019-07-12 | End: 2019-11-07 | Stop reason: SDUPTHER

## 2019-07-12 RX ORDER — CETIRIZINE HYDROCHLORIDE 10 MG/1
10 TABLET ORAL DAILY
Qty: 30 TABLET | Refills: 1 | Status: SHIPPED | OUTPATIENT
Start: 2019-07-12

## 2019-07-12 RX ORDER — SERTRALINE HYDROCHLORIDE 25 MG/1
25 TABLET, FILM COATED ORAL DAILY
Qty: 30 TABLET | Refills: 3 | Status: SHIPPED | OUTPATIENT
Start: 2019-07-12 | End: 2022-01-20 | Stop reason: SDUPTHER

## 2019-07-12 RX ORDER — ALBUTEROL SULFATE 90 UG/1
AEROSOL, METERED RESPIRATORY (INHALATION)
COMMUNITY
Start: 2018-10-24 | End: 2019-07-12 | Stop reason: SDUPTHER

## 2019-07-12 ASSESSMENT — PATIENT HEALTH QUESTIONNAIRE - PHQ9
SUM OF ALL RESPONSES TO PHQ QUESTIONS 1-9: 0
2. FEELING DOWN, DEPRESSED OR HOPELESS: 0
SUM OF ALL RESPONSES TO PHQ9 QUESTIONS 1 & 2: 0
SUM OF ALL RESPONSES TO PHQ QUESTIONS 1-9: 0
1. LITTLE INTEREST OR PLEASURE IN DOING THINGS: 0

## 2019-07-12 ASSESSMENT — ENCOUNTER SYMPTOMS
SHORTNESS OF BREATH: 0
ABDOMINAL PAIN: 0
CHEST TIGHTNESS: 0
ABDOMINAL DISTENTION: 0
SORE THROAT: 0
SINUS PAIN: 0
APNEA: 0
WHEEZING: 0

## 2019-07-12 NOTE — PROGRESS NOTES
Subjective:    Treva Stock is a 25 y.o. female with  has a past medical history of Anxiety, Asthma, and Seizures ([facility] [address] Presented to the office today for:  Chief Complaint   Patient presents with    Employment Physical     Work physical. Had physical 3 months ago for another job. HPI    Pt is a 25year old female with pmhx of seizure disorder and asthma. She is presenting to the clinic today for a work physical. 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 As a part of the documentation, pt was required to have Tdap and MMR. Pt also requested for medication refill. Review of Systems   Constitutional: Negative for activity change, fever and unexpected weight change. HENT: Negative for sinus pain and sore throat. Respiratory: Negative for apnea, chest tightness, shortness of breath and wheezing. Cardiovascular: Negative for chest pain and palpitations. Gastrointestinal: Negative for abdominal distention and abdominal pain. Genitourinary: Negative for difficulty urinating, menstrual problem and urgency. Musculoskeletal: Negative for arthralgias and myalgias. Neurological: Negative for dizziness, numbness and headaches. The patient has a   Family History   Problem Relation Age of Onset    Asthma Mother     Depression Mother     High Blood Pressure Mother        Objective:    /78 (Site: Left Upper Arm, Position: Sitting, Cuff Size: Medium Adult) Comment: machine  Pulse 85   Temp 98.2 °F (36.8 °C) (Oral)   Ht 5' 2.01\" (1.575 m)   Wt 156 lb 12.8 oz (71.1 kg)   LMP 06/18/2019   BMI 28.67 kg/m²    BP Readings from Last 3 Encounters:   07/12/19 114/78   06/26/19 118/84   04/25/19 108/62       Physical Exam   Constitutional: She is oriented to person, place, and time.  She appears well-developed and well-nourished. HENT:   Head: Normocephalic and atraumatic. Right Ear: External ear normal.   Left Ear: External ear normal.   Mouth/Throat: Oropharynx is clear and moist.   Eyes: Pupils are equal, round, and reactive to light. Conjunctivae and EOM are normal.   Neck: Normal range of motion. Cardiovascular: Normal rate, regular rhythm and normal heart sounds. Exam reveals no gallop and no friction rub. No murmur heard. Pulmonary/Chest: Effort normal and breath sounds normal. No respiratory distress. She has no wheezes. She exhibits no tenderness. Abdominal: Soft. Bowel sounds are normal. She exhibits no distension. There is no tenderness. Musculoskeletal: Normal range of motion. Neurological: She is alert and oriented to person, place, and time. No cranial nerve deficit. She exhibits normal muscle tone. Skin: Skin is warm. Lab Results   Component Value Date    WBC 9.8 04/25/2019    HGB 13.2 04/25/2019    HCT 40.0 04/25/2019     04/25/2019    ALT 25 11/10/2013    AST 21 11/10/2013     04/25/2019    K 3.6 (L) 04/25/2019     04/25/2019    CREATININE 0.80 04/25/2019    BUN 7 04/25/2019    CO2 20 04/25/2019     Lab Results   Component Value Date    CALCIUM 8.2 (L) 04/25/2019     No results found for: LDLCALC, LDLCHOLESTEROL, LDLDIRECT    Assessment and Plan:    1. Physical examination of employee  - Tdap received 2017  - MMR vaccine subcutaneous - last dose 2001 as per [facility] records, booster administered in office today    2. Anxiety  - sertraline (ZOLOFT) 25 MG tablet; Take 1 tablet by mouth daily  Dispense: 30 tablet; Refill: 3    3. Allergic rhinitis, unspecified seasonality, unspecified trigger  - cetirizine (ZYRTEC ALLERGY) 10 MG tablet; Take 1 tablet by mouth daily  Dispense: 30 tablet; Refill: 1    4. Uncomplicated asthma, unspecified asthma severity, unspecified whether persistent  - albuterol sulfate HFA (PROVENTIL HFA) 108 (90 Base) MCG/ACT inhaler;  Inhale 2 puffs into

## 2019-11-07 ENCOUNTER — OFFICE VISIT (OUTPATIENT)
Dept: FAMILY MEDICINE CLINIC | Age: 24
End: 2019-11-07

## 2019-11-07 VITALS
BODY MASS INDEX: 28.49 KG/M2 | SYSTOLIC BLOOD PRESSURE: 121 MMHG | HEART RATE: 67 BPM | WEIGHT: 155.8 LBS | DIASTOLIC BLOOD PRESSURE: 79 MMHG

## 2019-11-07 DIAGNOSIS — G40.909 SEIZURE DISORDER (HCC): ICD-10-CM

## 2019-11-07 PROCEDURE — 99213 OFFICE O/P EST LOW 20 MIN: CPT | Performed by: STUDENT IN AN ORGANIZED HEALTH CARE EDUCATION/TRAINING PROGRAM

## 2019-11-07 RX ORDER — LEVETIRACETAM 100 MG/ML
500 SOLUTION ORAL 2 TIMES DAILY
Qty: 300 ML | Refills: 3 | Status: SHIPPED | OUTPATIENT
Start: 2019-11-07 | End: 2020-01-03 | Stop reason: SDUPTHER

## 2019-11-07 ASSESSMENT — ENCOUNTER SYMPTOMS
COUGH: 0
SHORTNESS OF BREATH: 0

## 2020-01-03 ENCOUNTER — OFFICE VISIT (OUTPATIENT)
Dept: FAMILY MEDICINE CLINIC | Age: 25
End: 2020-01-03
Payer: MEDICAID

## 2020-01-03 VITALS
SYSTOLIC BLOOD PRESSURE: 98 MMHG | DIASTOLIC BLOOD PRESSURE: 61 MMHG | BODY MASS INDEX: 29.59 KG/M2 | HEIGHT: 62 IN | HEART RATE: 73 BPM | WEIGHT: 160.8 LBS

## 2020-01-03 PROCEDURE — 99213 OFFICE O/P EST LOW 20 MIN: CPT | Performed by: STUDENT IN AN ORGANIZED HEALTH CARE EDUCATION/TRAINING PROGRAM

## 2020-01-03 PROCEDURE — 99211 OFF/OP EST MAY X REQ PHY/QHP: CPT | Performed by: FAMILY MEDICINE

## 2020-01-03 RX ORDER — LEVETIRACETAM 100 MG/ML
500 SOLUTION ORAL 2 TIMES DAILY
Qty: 300 ML | Refills: 3 | Status: SHIPPED | OUTPATIENT
Start: 2020-01-03 | End: 2020-02-18 | Stop reason: SDUPTHER

## 2020-01-03 ASSESSMENT — ENCOUNTER SYMPTOMS
EYE PAIN: 0
PHOTOPHOBIA: 0
SHORTNESS OF BREATH: 0
NAUSEA: 0
ABDOMINAL PAIN: 0
SORE THROAT: 0
WHEEZING: 0
COUGH: 0
VOMITING: 0
RHINORRHEA: 0

## 2020-01-03 NOTE — PATIENT INSTRUCTIONS
Patient Education        Deciding About Using Medicines To Quit Smoking  How can you decide about using medicines to quit smoking? What are the medicines you can use? Your doctor may prescribe varenicline (Chantix) or bupropion (Zyban). These medicines can help you cope with cravings for tobacco. They are pills that don't contain nicotine. You also can use nicotine replacement products. These do contain nicotine. There are many types. · Gum and lozenges slowly release nicotine into your mouth. · Patches stick to your skin. They slowly release nicotine into your bloodstream.  · An inhaler has a robins that contains nicotine. You breathe in a puff of nicotine vapor through your mouth and throat. · Nasal spray releases a mist that contains nicotine. What are key points about this decision? · Using medicines can double your chances of quitting smoking. They can ease cravings and withdrawal symptoms. · Getting counseling along with using medicine can raise your chances of quitting even more. · If you smoke fewer than 5 cigarettes a day, you may not need medicines to help you quit smoking. · These medicines have less nicotine than cigarettes. And by itself, nicotine is not nearly as harmful as smoking. The tars, carbon monoxide, and other toxic chemicals in tobacco cause the harmful effects. · The side effects of nicotine replacement products depend on the type of product. For example, a patch can make your skin red and itchy. Medicines in pill form can make you sick to your stomach. They can also cause dry mouth and trouble sleeping. For most people, the side effects are not bad enough to make them stop using the products. Why might you choose to use medicines to quit smoking? · You have tried on your own to stop smoking, but you were not able to stop. · You smoke more than 5 cigarettes a day. · You want to increase your chances of quitting smoking.   · You want to reduce your cravings and withdrawal

## 2020-01-03 NOTE — PROGRESS NOTES
Subjective:    Shivam Dalton is a 25 y.o. female with  has a past medical history of Anxiety, Asthma, and Seizures ([address] Family History   Problem Relation Age of Onset    Asthma Mother     Depression Mother     High Blood Pressure Mother        Presented tot office today for:  Chief Complaint   Patient presents with    Seizures    Orders     Pt wants MRI order     Nicotine Dependence     discuss treatment options, pt interested in patches        HPI   Patient is a 25year old female here for follow up of seizure disorder  Last seen by Neurology inpatient at [facility] on 4/25/19- Dr. Bhandari Speak    Patient has history of localization-related epilepsy, status post left temporal lobe mass resection in 2015. She is supposed to be on Keppra 500 mg twice a day    Follows with [facility] Neurology  Has not been seen there in more than 1 year    Last MRI Brain 4/24/19 was performed which showed evidence of left temporal gliosis and lobectomy without any residual mass or abnormal enhancement. States she is compliant with Keppra solution. Has been told recently of increasing absence seizures. Has also been having increased anxiety. Requesting Emotional Support Animal.    Also interested in in smoking cessation. Smokes 5-6 cigarettes a day for the past 5 years      Review of Systems   Constitutional: Negative for chills and fever. HENT: Negative for congestion, rhinorrhea and sore throat. Eyes: Negative for photophobia and pain. Respiratory: Negative for cough, shortness of breath and wheezing. Cardiovascular: Negative for chest pain and palpitations. Gastrointestinal: Negative for abdominal pain, nausea and vomiting. Genitourinary: Negative for frequency and urgency. Musculoskeletal: Negative for arthralgias and myalgias. Neurological: Positive for seizures. Negative for dizziness, light-headedness and headaches.        Objective:    BP 98/61 (Site: Left Upper Arm, Position: Sitting, Cuff

## 2020-01-03 NOTE — PROGRESS NOTES
Attending Physician Statement  I have discussed the care of Slick Hayward, including pertinent history and exam findings,  with the resident. I have reviewed the key elements of all parts of the encounter with the resident. I agree with the assessment, plan and orders as documented by the resident.   (GE Modifier)    Arlyn Morgan

## 2020-02-06 ENCOUNTER — TELEPHONE (OUTPATIENT)
Dept: NEUROSURGERY | Age: 25
End: 2020-02-06

## 2020-02-17 ENCOUNTER — TELEPHONE (OUTPATIENT)
Dept: FAMILY MEDICINE CLINIC | Age: 25
End: 2020-02-17

## 2020-02-17 NOTE — TELEPHONE ENCOUNTER
Patient called back, wants forms sent to [address] [place] [address], [address] [facility]

## 2020-02-17 NOTE — TELEPHONE ENCOUNTER
Opened chart for purpose of chart review, verification of service requested as per Home Health orders / documentation review for utilities shut off extension.     Mynor Pina DO

## 2020-02-17 NOTE — TELEPHONE ENCOUNTER
Message left-Pt. to call with correct address for completion of [employer] form. We have two different addresses.

## 2020-02-18 ENCOUNTER — OFFICE VISIT (OUTPATIENT)
Dept: NEUROLOGY | Age: 25
End: 2020-02-18
Payer: COMMERCIAL

## 2020-02-18 VITALS
HEART RATE: 72 BPM | DIASTOLIC BLOOD PRESSURE: 75 MMHG | SYSTOLIC BLOOD PRESSURE: 113 MMHG | OXYGEN SATURATION: 100 % | WEIGHT: 159.6 LBS | HEIGHT: 62 IN | BODY MASS INDEX: 29.37 KG/M2

## 2020-02-18 PROCEDURE — 99205 OFFICE O/P NEW HI 60 MIN: CPT | Performed by: STUDENT IN AN ORGANIZED HEALTH CARE EDUCATION/TRAINING PROGRAM

## 2020-02-18 RX ORDER — LEVETIRACETAM 100 MG/ML
750 SOLUTION ORAL 2 TIMES DAILY
Qty: 450 ML | Refills: 3 | Status: SHIPPED | OUTPATIENT
Start: 2020-02-18 | End: 2020-05-20 | Stop reason: SDUPTHER

## 2020-02-18 NOTE — PROGRESS NOTES
[address] [place] [address], [facility] [address], [facility], [address] [facility], [address] [place] [address]  P: [phone]  F: [address] NOTE     PATIENT NAME: Paradise Callaway  PATIENT MRN: [id number]  PRIMARY CARE PHYSICIAN: Yoanna Gordon MD    HPI:      Paradise Callaway is a 22 y.o. 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 She used to follow-up with [facility] neurology, as per patient last time she was seen was around 1-1/2-year ago. Patient started having seizures when she was 23years old, reports nocturnal seizures with generalized tonic-clonic activity at that time. On an average complains of 3-4 seizures per year. She had been seen multiple times in the hospital for breakthrough seizures due to medication noncompliance and found to have subtherapeutic Keppra levels on presentation. Currently she is on Keppra 500 mg twice a day. Patient also complains of staring spells associated with decreased awareness, difficulty with speech, sometimes followed by generalized tonic-clonic seizures. Patient was taking extra dose of Keppra when she was having staring spells to prevent generalized tonic-clonic seizures. The staring spells started around couple of months before, endorses a lot of stress going on in her life too. Currently she is having staring spells 2 or 3 times in a week. Denies any tongue bite or bowel bladder incontinence associated with the staring spells, endorses postictal confusion lasting for 10 to 15 minutes.   As per patient she is compliant with Keppra 500 mg twice a day but still having the staring  nicotine (NICODERM CQ) 7 MG/24HR Place 1 patch onto the skin every 24 hours 30 patch 3    sertraline (ZOLOFT) 25 MG tablet Take 1 tablet by mouth daily 30 tablet 3    cetirizine (ZYRTEC ALLERGY) 10 MG tablet Take 1 tablet by mouth daily 30 tablet 1    albuterol sulfate HFA (VENTOLIN HFA) 108 (90 Base) MCG/ACT inhaler Inhale 2 puffs into the lungs every 6 hours as needed for Wheezing 1 Inhaler 3    ondansetron (ZOFRAN) 4 MG tablet Take 1 tablet by mouth 3 times daily as needed for Nausea or Vomiting (Patient not taking: Reported on 2/18/2020) 20 tablet 0     No current facility-administered medications for this visit. Allergies  No Known Allergies     REVIEW OF SYSTEMS:     Review of Systems   Constitutional: Positive for fatigue. Negative for chills, diaphoresis, fever and unexpected weight change. HENT: Negative for congestion, ear discharge, ear pain, hearing loss, sinus pain and sore throat. Eyes: Negative for photophobia, pain, discharge, redness and visual disturbance. Respiratory: Negative for cough and shortness of breath. Cardiovascular: Negative for chest pain, palpitations and leg swelling. Gastrointestinal: Negative for abdominal pain, constipation, diarrhea, nausea and vomiting. Endocrine: Negative for polydipsia and polyuria. Genitourinary: Negative for difficulty urinating and hematuria. Musculoskeletal: Negative for neck pain. Skin: Negative for pallor and rash. Neurological: Positive for seizures. Negative for dizziness, tremors, syncope, facial asymmetry, speech difficulty, weakness, light-headedness, numbness and headaches. Hematological: Does not bruise/bleed easily. Psychiatric/Behavioral: Positive for confusion, decreased concentration, dysphoric mood and sleep disturbance. Negative for agitation, behavioral problems and hallucinations. The patient is nervous/anxious.          VITALS  /75 (Site: Right Upper Arm, Position: Sitting, Cuff Size: Medium Adult)   Pulse 72   Ht 5' 2\" (1.575 m)   Wt 159 lb 9.6 oz (72.4 kg)   LMP 01/23/2020 (Exact Date)   SpO2 100%   BMI 29.19 kg/m²      PHYSICAL EXAMINATION:     Constitutional: Well developed, well nourished and in no acute distress. Head:  normocephalic, atraumatic. Neck: supple, no carotid bruits, thyroid not palpable  Respiratory: Clear to auscultation bilaterally with no use of accessory muscles during respiration. Cardiovascular: normal rate, regular rhythm, no murmur, gallop, rub. Abdomen: Soft, nontender, nondistended, normal bowel sounds, no hepatomegaly or splenomegaly  Extremities:  peripheral pulses palpable, no pedal edema or calf pain with palpation  Psych: normal affect      NEUROLOGICAL EXAMINATION:     Mental status   Alert and oriented; intact memory with no confusion, speech or language problems; no hallucinations or delusions     Cranial nerves   II - visual fields intact to confrontation                                                III, IV, VI - extra-ocular muscles full: no pupillary defect; no HOLLY, no nystagmus, no ptosis   V - normal facial sensation                                                               VII - normal facial symmetry                                                             VIII - intact hearing                                                                             IX, X - symmetrical palate                                                                  XI - symmetrical shoulder shrug                                                       XII - midline tongue without atrophy or fasciculation     Motor function  Normal muscle bulk and tone  Muscle strength: normal power 5/5     Sensory function Intact to touch in bilateral upper and lower extremities. Cerebellar Intact fine motor movement. No involuntary movements or tremors     Reflex function Intact 2+ DTR and symmetric.  Negative Babinski     Gait                  Normal station and gait understanding.       - Follow up in the clinic in 4 weeks  - Instructed patient to call the clinic if symptoms worsen or develop any new symptoms. I have spent 60 minutes face to face with the patient more than 50% of this time was spent counseling and coordinating care.       Electronically signed by Lucila Sanders MD on 2/18/2020 at 4:41 PM

## 2020-02-19 ASSESSMENT — ENCOUNTER SYMPTOMS
EYE REDNESS: 0
SINUS PAIN: 0
COUGH: 0
SHORTNESS OF BREATH: 0
CONSTIPATION: 0
EYE DISCHARGE: 0
NAUSEA: 0
SORE THROAT: 0
EYE PAIN: 0
DIARRHEA: 0
VOMITING: 0
ABDOMINAL PAIN: 0
PHOTOPHOBIA: 0

## 2020-03-19 ENCOUNTER — TELEPHONE (OUTPATIENT)
Dept: FAMILY MEDICINE CLINIC | Age: 25
End: 2020-03-19

## 2020-03-24 ENCOUNTER — TELEPHONE (OUTPATIENT)
Dept: FAMILY MEDICINE CLINIC | Age: 25
End: 2020-03-24

## 2020-03-24 NOTE — TELEPHONE ENCOUNTER
Patient follows with Dr. Feng Burnham (Neurology) who is managing her seizure disorder. Pt should contact their office. Thank you.

## 2020-03-24 NOTE — TELEPHONE ENCOUNTER
Attempted to contact patient, no answer vm was left. If patient calls back please advise of message below.

## 2020-03-24 NOTE — TELEPHONE ENCOUNTER
Please have patient contact her Neurologist as they are managing her seizure disorder and would be best to assess if it is needed.  Thank you

## 2020-03-24 NOTE — TELEPHONE ENCOUNTER
Patient called stating she is due for her refill of keppra tomorrow and is using liquid but wants to know if she can go back to taking tablets instead. Please advise.

## 2020-05-20 RX ORDER — LEVETIRACETAM 100 MG/ML
SOLUTION ORAL
Qty: 450 ML | Refills: 3 | Status: SHIPPED | OUTPATIENT
Start: 2020-05-20 | End: 2020-06-03 | Stop reason: SDUPTHER

## 2020-05-20 NOTE — TELEPHONE ENCOUNTER
E-scribe requesting refill for Keppra. Please review and e-scribe if applicable.      Last Visit Date: 2.18.2020    Next Visit Date:  Future Appointments   Date Time Provider Travis Viktoriya   6/1/2020  3:15 PM Mario Muniz MD [facility]

## 2020-06-03 ENCOUNTER — VIRTUAL VISIT (OUTPATIENT)
Dept: FAMILY MEDICINE CLINIC | Age: 25
End: 2020-06-03
Payer: MEDICARE

## 2020-06-03 VITALS — BODY MASS INDEX: 27.8 KG/M2 | WEIGHT: 152 LBS

## 2020-06-03 PROBLEM — K21.9 GASTROESOPHAGEAL REFLUX DISEASE: Status: ACTIVE | Noted: 2020-06-03

## 2020-06-03 PROCEDURE — 99212 OFFICE O/P EST SF 10 MIN: CPT | Performed by: STUDENT IN AN ORGANIZED HEALTH CARE EDUCATION/TRAINING PROGRAM

## 2020-06-03 RX ORDER — LEVETIRACETAM 100 MG/ML
SOLUTION ORAL
Qty: 450 ML | Refills: 0 | Status: SHIPPED | OUTPATIENT
Start: 2020-06-03 | End: 2020-06-18

## 2020-06-03 RX ORDER — OMEPRAZOLE 40 MG/1
40 CAPSULE, DELAYED RELEASE ORAL
Qty: 90 CAPSULE | Refills: 1 | Status: SHIPPED | OUTPATIENT
Start: 2020-06-03 | End: 2020-11-19

## 2020-06-03 RX ORDER — ALBUTEROL SULFATE 90 UG/1
2 AEROSOL, METERED RESPIRATORY (INHALATION) EVERY 6 HOURS PRN
Qty: 1 INHALER | Refills: 3 | Status: SHIPPED | OUTPATIENT
Start: 2020-06-03 | End: 2022-02-02 | Stop reason: SDUPTHER

## 2020-06-03 NOTE — PATIENT INSTRUCTIONS
Patient Education        Gastroesophageal Reflux Disease (GERD): Care Instructions  Your Care Instructions     Gastroesophageal reflux disease (GERD) is the backward flow of stomach acid into the esophagus. The esophagus is the tube that leads from your throat to your stomach. A one-way valve prevents the stomach acid from backing up into this tube. When you have GERD, this valve does not close tightly enough. This can also cause pain and swelling in your esophagus (esophagitis). If you have mild GERD symptoms including heartburn, you may be able to control the problem with antacids or over-the-counter medicine. Changing your diet and eating habits, such as not eating late at night, losing weight, and making other lifestyle changes can also help reduce symptoms. Follow-up care is a key part of your treatment and safety. Be sure to make and go to all appointments, and call your doctor if you are having problems. It's also a good idea to know your test results and keep a list of the medicines you take. How can you care for yourself at home? · Take your medicines exactly as prescribed. Call your doctor if you think you are having a problem with your medicine. · Your doctor may recommend over-the-counter medicine. For mild or occasional indigestion, antacids, such as Tums, Gaviscon, Mylanta, or Maalox, may help. Your doctor also may recommend over-the-counter acid reducers, such as Pepcid AC (famotidine), Tagamet HB (cimetidine), or Prilosec (omeprazole). Read and follow all instructions on the label. If you use these medicines often, talk with your doctor. · Change your eating habits. ? It's best to eat several small meals instead of two or three large meals. ? After you eat, wait 2 to 3 hours before you lie down. ? Chocolate, mint, and alcohol can make GERD worse. ? Spicy foods, foods that have a lot of acid (like tomatoes and oranges), and coffee can make GERD symptoms worse in some people.  If your symptoms

## 2020-06-18 ENCOUNTER — OFFICE VISIT (OUTPATIENT)
Dept: NEUROLOGY | Age: 25
End: 2020-06-18
Payer: MEDICARE

## 2020-06-18 VITALS
OXYGEN SATURATION: 100 % | SYSTOLIC BLOOD PRESSURE: 112 MMHG | WEIGHT: 150 LBS | TEMPERATURE: 98.1 F | HEIGHT: 62 IN | DIASTOLIC BLOOD PRESSURE: 72 MMHG | BODY MASS INDEX: 27.6 KG/M2 | HEART RATE: 79 BPM

## 2020-06-18 PROCEDURE — G8419 CALC BMI OUT NRM PARAM NOF/U: HCPCS | Performed by: STUDENT IN AN ORGANIZED HEALTH CARE EDUCATION/TRAINING PROGRAM

## 2020-06-18 PROCEDURE — 99214 OFFICE O/P EST MOD 30 MIN: CPT | Performed by: STUDENT IN AN ORGANIZED HEALTH CARE EDUCATION/TRAINING PROGRAM

## 2020-06-18 PROCEDURE — G8427 DOCREV CUR MEDS BY ELIG CLIN: HCPCS | Performed by: STUDENT IN AN ORGANIZED HEALTH CARE EDUCATION/TRAINING PROGRAM

## 2020-06-18 PROCEDURE — 4004F PT TOBACCO SCREEN RCVD TLK: CPT | Performed by: STUDENT IN AN ORGANIZED HEALTH CARE EDUCATION/TRAINING PROGRAM

## 2020-06-18 RX ORDER — LEVETIRACETAM 1000 MG/1
1000 TABLET ORAL 2 TIMES DAILY
Qty: 60 TABLET | Refills: 2 | Status: SHIPPED | OUTPATIENT
Start: 2020-06-18 | End: 2020-11-09 | Stop reason: SDUPTHER

## 2020-06-18 ASSESSMENT — ENCOUNTER SYMPTOMS
SINUS PAIN: 0
COUGH: 0
SORE THROAT: 0
EYE DISCHARGE: 0
PHOTOPHOBIA: 0
EYE PAIN: 0
ABDOMINAL PAIN: 0
DIARRHEA: 0
VOMITING: 0
EYE REDNESS: 0
CONSTIPATION: 0
SHORTNESS OF BREATH: 0
NAUSEA: 0

## 2020-06-18 NOTE — PROGRESS NOTES
was taking extra dose of Keppra when she was having staring spells to prevent generalized tonic-clonic seizures. The staring spells started around couple of months before, endorses a lot of stress going on in her life too. Currently she is having staring spells 2 or 3 times in a week. Denies any tongue bite or bowel bladder incontinence associated with the staring spells, endorses postictal confusion lasting for 10 to 15 minutes. As per patient she is compliant with Keppra 500 mg twice a day but still having the staring spells. She used to follow-up with neurosurgeon Dr. Fam Davidson, as per patient she is supposed to get MRI of the brain every year, last MRI brain was in April 2019 when she was admitted in the hospital and she said she is due for another MRI brain in April 2020. Semiology:  Type 1: Nocturnal generalized tonic-clonic seizures  Type 2: Staring spells    Description of event:   Type I  Ictal description: Mostly nocturnal seizures,-described as generalized tonic-clonic seizures, no auras. Bowel bladder incontinence: No             Tongue biting / self trauma: No  Post ictal: Symptoms: Confusion for around 30 mins                    Recollection of event: No                Type II: Staring spells associated with loss of awareness, difficulty with speech lasting for 3 to 4 minutes sometimes followed by generalized tonic-clonic seizures. Denies any tongue bite or bowel bladder incontinence. Endorses postictal confusion for around 10 to 15 minutes. Last known spell: Last week had 3 focal seizures, last generalized tonic-clonic seizure was around couple of months ago.   Triggers for seizures: Sleep deprivation/stress    Birth history: Patient is a product of a full-term pregnancy, no birth complications or injury reported    Risk / Associated factors:   H/o CNS infections ( Meningitis/Encephalitis): no  H/o Traumatic brain injury/ concussions:yes -history of brain tumor status post resection   Pre/ events: no  Childhood seizures: no   Febrile Seizures: no  Developmental delays: no  Family history of seizures/epilepsy: no              Alcohol use: no    Driving history: Not driving  Employment history: Unemployed    Medications:  Current antiepileptic drugs (AED meds): Keppra 500 mg twice a day  Past Medications tried: None      Past workup:    MRI brain with and without contrast 2019  Impression   Changes of partial left temporal lobectomy with mild gliosis surrounding the   resection cavity.  No residual mass or abnormal enhancement.          EEG: None        PATIENT HISTORY:     Past Medical History:   Diagnosis Date    Anxiety     Asthma 2019    Seizures (HCC)         Past Surgical History:   Procedure Laterality Date    BRAIN SURGERY          Social History     Socioeconomic History    Marital status: Single     Spouse name: Not on file    Number of children: Not on file    Years of education: Not on file    Highest education level: Not on file   Occupational History    Not on file   Social Needs    Financial resource strain: Not on file    Food insecurity     Worry: Not on file     Inability: Not on file    Transportation needs     Medical: Not on file     Non-medical: Not on file   Tobacco Use    Smoking status: Current Every Day Smoker     Packs/day: 0.25     Years: 2.00     Pack years: 0.50     Types: Cigarettes    Smokeless tobacco: Never Used    Tobacco comment: 2-3 per day   Substance and Sexual Activity    Alcohol use: No    Drug use: Yes     Types: Marijuana     Comment: daily    Sexual activity: Yes     Partners: Male   Lifestyle    Physical activity     Days per week: Not on file     Minutes per session: Not on file    Stress: Not on file   Relationships    Social connections     Talks on phone: Not on file     Gets together: Not on file     Attends [employer] service: Not on file     Active member of club or organization: Not on file Genitourinary: Negative for difficulty urinating and hematuria. Musculoskeletal: Negative for neck pain. Skin: Negative for pallor and rash. Neurological: Positive for seizures. Negative for dizziness, tremors, syncope, facial asymmetry, speech difficulty, weakness, light-headedness, numbness and headaches. Hematological: Does not bruise/bleed easily. Psychiatric/Behavioral: Positive for confusion, decreased concentration, dysphoric mood and sleep disturbance. Negative for agitation, behavioral problems and hallucinations. The patient is nervous/anxious. VITALS  /72 (Site: Left Upper Arm, Position: Sitting, Cuff Size: Medium Adult)   Pulse 79   Temp 98.1 °F (36.7 °C) (Temporal)   Ht 5' 2\" (1.575 m)   Wt 150 lb (68 kg)   SpO2 100%   BMI 27.44 kg/m²      PHYSICAL EXAMINATION:     Constitutional: Well developed, well nourished and in no acute distress. Head:  normocephalic, atraumatic. Neck: supple, no carotid bruits, thyroid not palpable  Respiratory: Clear to auscultation bilaterally with no use of accessory muscles during respiration. Cardiovascular: normal rate, regular rhythm, no murmur, gallop, rub.   Abdomen: Soft, nontender, nondistended, normal bowel sounds, no hepatomegaly or splenomegaly  Extremities:  peripheral pulses palpable, no pedal edema or calf pain with palpation  Psych: normal affect      NEUROLOGICAL EXAMINATION:     Mental status   Alert and oriented; intact memory with no confusion, speech or language problems; no hallucinations or delusions     Cranial nerves   II - visual fields intact to confrontation                                                III, IV, VI - extra-ocular muscles full: no pupillary defect; no HOLLY, no nystagmus, no ptosis   V - normal facial sensation                                                               VII - normal facial symmetry                                                             VIII - intact hearing

## 2020-06-30 ENCOUNTER — TELEPHONE (OUTPATIENT)
Dept: NEUROLOGY | Age: 25
End: 2020-06-30

## 2020-06-30 NOTE — TELEPHONE ENCOUNTER
Patient states she works at [employer] which is [address] work but she is a . Informed patient she would still need to have MRI and EEG done per Caryn Bingham. Provided patient with scheduling number.      Fax work note [phone]

## 2020-06-30 NOTE — TELEPHONE ENCOUNTER
What work she does?   Please inform the patient that she also need to complete her MRI Brain and EEG

## 2020-06-30 NOTE — TELEPHONE ENCOUNTER
Patient can work if she is a  with following seizure precautions. No driving for at least 6 months from last seizure,   no activity at dangerous heights,   no operation of dangerous/heavy machinery,   No baths  No swimming alone. Caution (preferably accompanied) with cooking or near fires. We can give a letter mentioning about seizure precautions as above.

## 2020-10-14 ENCOUNTER — TELEPHONE (OUTPATIENT)
Dept: NEUROLOGY | Age: 25
End: 2020-10-14

## 2020-10-14 NOTE — LETTER
[address], [address] [address]  Phone: [phone]  Fax: [phone]       10/14/2020    [address] [facility] [address]    Dear Keenan Del Rosario,    You recently missed a scheduled appointment with Dr Kristen Ferguson on 10/14/2020. Your health and follow-up medical care are important to us. Please call our office as soon as possible so that we may reschedule your appointment. If you have already rescheduled your appointment, please disregard this letter. This is the first scheduled appointment that you have missed within the last twelve months. If you miss 2 more appointment, you may be dismissed from the practice. For future appointments that you are unable to keep, please call the office at [phone] at least 24 hours in advance to cancel and reschedule.      Sincerely,      [address]

## 2020-11-07 RX ORDER — LEVETIRACETAM 1000 MG/1
1000 TABLET ORAL 2 TIMES DAILY
Qty: 60 TABLET | Refills: 2 | Status: SHIPPED | OUTPATIENT
Start: 2020-11-07 | End: 2020-11-09

## 2020-11-09 RX ORDER — LEVETIRACETAM 1000 MG/1
1000 TABLET ORAL 2 TIMES DAILY
Qty: 60 TABLET | Refills: 2 | Status: SHIPPED | OUTPATIENT
Start: 2020-11-09 | End: 2020-11-10 | Stop reason: SDUPTHER

## 2020-11-10 ENCOUNTER — OFFICE VISIT (OUTPATIENT)
Dept: NEUROLOGY | Age: 25
End: 2020-11-10
Payer: MEDICARE

## 2020-11-10 VITALS
HEART RATE: 65 BPM | HEIGHT: 62 IN | DIASTOLIC BLOOD PRESSURE: 76 MMHG | TEMPERATURE: 97.5 F | BODY MASS INDEX: 26.68 KG/M2 | WEIGHT: 145 LBS | OXYGEN SATURATION: 100 % | SYSTOLIC BLOOD PRESSURE: 117 MMHG

## 2020-11-10 PROCEDURE — 4004F PT TOBACCO SCREEN RCVD TLK: CPT | Performed by: STUDENT IN AN ORGANIZED HEALTH CARE EDUCATION/TRAINING PROGRAM

## 2020-11-10 PROCEDURE — G8427 DOCREV CUR MEDS BY ELIG CLIN: HCPCS | Performed by: STUDENT IN AN ORGANIZED HEALTH CARE EDUCATION/TRAINING PROGRAM

## 2020-11-10 PROCEDURE — G8484 FLU IMMUNIZE NO ADMIN: HCPCS | Performed by: STUDENT IN AN ORGANIZED HEALTH CARE EDUCATION/TRAINING PROGRAM

## 2020-11-10 PROCEDURE — 99214 OFFICE O/P EST MOD 30 MIN: CPT | Performed by: STUDENT IN AN ORGANIZED HEALTH CARE EDUCATION/TRAINING PROGRAM

## 2020-11-10 PROCEDURE — G8419 CALC BMI OUT NRM PARAM NOF/U: HCPCS | Performed by: STUDENT IN AN ORGANIZED HEALTH CARE EDUCATION/TRAINING PROGRAM

## 2020-11-10 RX ORDER — LEVETIRACETAM 750 MG/1
1500 TABLET ORAL 2 TIMES DAILY
Qty: 120 TABLET | Refills: 3 | Status: SHIPPED | OUTPATIENT
Start: 2020-11-10 | End: 2021-03-05 | Stop reason: SDUPTHER

## 2020-11-10 ASSESSMENT — ENCOUNTER SYMPTOMS
SHORTNESS OF BREATH: 0
PHOTOPHOBIA: 0
NAUSEA: 0
EYE REDNESS: 0
EYE PAIN: 0
ABDOMINAL PAIN: 0
SINUS PAIN: 0
VOMITING: 0
EYE DISCHARGE: 0
COUGH: 0
CONSTIPATION: 0
DIARRHEA: 0
SORE THROAT: 0

## 2020-11-10 NOTE — PROGRESS NOTES
[address] [place] [address], [facility] [address], [facility], [facility], [address] [place] [address]  P: [phone]  F: [phone]    NEUROLOGY CLINIC NOTE     PATIENT NAME: Yehuda Swenson  PATIENT MRN: [id number]  PRIMARY CARE PHYSICIAN: Danae Kumar MD      Interval History: 11/10/2020  Patient was last seen on 6/18/2020. Since last visit she had multiple absence seizures, maybe around 4-5  absence seizures in a month. These usually happen during her ovulation time or during her menstrual cycle time. As per description from the patient, it comes out of nowhere, denies any auras or warning sign prior to the onset of the episode. May last for couple of seconds like 10 to 15 seconds to 1 to 2 minutes. Denies any tongue bite, denies any bowel or bladder incontinence.,  Endorses postictal confusion and tiredness. During the last clinic visit her dose of Keppra was increased from 750 mg twice a day to 1000 mg twice a day, denies any significant improvement in her symptoms with that. She is compliant with the medication, denies any side effects on the medications. Patient denies any generalized tonic-clonic seizures or full body seizure since last visit. Her last generalized tonic-clonic seizure was maybe around a year ago. MRI of the brain and EEG was ordered, patient has not yet completed the test.     Denies any other new neurologic concerns. Denies any other changes in the medications. Denies any new allergies. Interval History: 6/18/2020   Patient was last seen in the clinic in February 2020, since last visit she had around 4-5 seizures, describes only staring spells. Denies any generalized tonic-clonic seizures. Does not remember when her last GTC seizure was. She is compliant with Keppra, taking 750 mg twice a day, denies any side effects on the medication. MRI of the brain and EEG was ordered during last visit, not yet completed.     Denies any other new neurologic concerns during this visit. Notes from 2/18/20  HPI:      Jonathan Freedman is a 22 y.o. 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 She used to follow-up with [address] [place] [address] neurology, as per patient last time she was seen was around 1-1/2-year ago. Patient started having seizures when she was 23years old, reports nocturnal seizures with generalized tonic-clonic activity at that time. On an average complains of 3-4 seizures per year. She had been seen multiple times in the hospital for breakthrough seizures due to medication noncompliance and found to have subtherapeutic Keppra levels on presentation. Currently she is on Keppra 500 mg twice a day. Patient also complains of staring spells associated with decreased awareness, difficulty with speech, sometimes followed by generalized tonic-clonic seizures. Patient was taking extra dose of Keppra when she was having staring spells to prevent generalized tonic-clonic seizures. The staring spells started around couple of months before, endorses a lot of stress going on in her life too. Currently she is having staring spells 2 or 3 times in a week. Denies any tongue bite or bowel bladder incontinence associated with the staring spells, endorses postictal confusion lasting for 10 to 15 minutes. As per patient she is compliant with Keppra 500 mg twice a day but still having the staring spells.     She used to follow-up with neurosurgeon Dr. Delon Jovel, as per patient she is supposed to get MRI of the brain every year, last MRI brain was in April 2019 when she was admitted in the hospital and she said she is due for another MRI brain in 2020. Semiology:  Type 1: Nocturnal generalized tonic-clonic seizures  Type 2: Staring spells    Description of event:   Type I  Ictal description: Mostly nocturnal seizures,-described as generalized tonic-clonic seizures, no auras. Bowel bladder incontinence: No             Tongue biting / self trauma: No  Post ictal: Symptoms: Confusion for around 30 mins                    Recollection of event: No                Type II: Staring spells associated with loss of awareness, difficulty with speech lasting for 3 to 4 minutes sometimes followed by generalized tonic-clonic seizures. Denies any tongue bite or bowel bladder incontinence. Endorses postictal confusion for around 10 to 15 minutes. Last known spell: Last week had 3 focal seizures, last generalized tonic-clonic seizure was around couple of months ago. Triggers for seizures: Sleep deprivation/stress    Birth history: Patient is a product of a full-term pregnancy, no birth complications or injury reported    Risk / Associated factors:   H/o CNS infections ( Meningitis/Encephalitis): no  H/o Traumatic brain injury/ concussions:yes -history of brain tumor status post resection   Pre/ events: no  Childhood seizures: no   Febrile Seizures: no  Developmental delays: no  Family history of seizures/epilepsy: no              Alcohol use: no    Driving history: Not driving  Employment history: Unemployed    Medications:  Current antiepileptic drugs (AED meds): Keppra 500 mg twice a day  Past Medications tried: None      Past workup:    MRI brain with and without contrast 2019  Impression   Changes of partial left temporal lobectomy with mild gliosis surrounding the   resection cavity.  No residual mass or abnormal enhancement.          EEG: None        PATIENT HISTORY:     Past Medical History:   Diagnosis Date    Anxiety     Asthma 2019    Seizures ([facility] [address]         Past Surgical History: tablet 3    cetirizine (ZYRTEC ALLERGY) 10 MG tablet Take 1 tablet by mouth daily 30 tablet 1    omeprazole (PRILOSEC) 40 MG delayed release capsule Take 1 capsule by mouth every morning (before breakfast) (Patient not taking: Reported on 11/10/2020) 90 capsule 1    nicotine (NICODERM CQ) 7 MG/24HR Place 1 patch onto the skin every 24 hours (Patient not taking: Reported on 6/3/2020) 30 patch 3    ondansetron (ZOFRAN) 4 MG tablet Take 1 tablet by mouth 3 times daily as needed for Nausea or Vomiting (Patient not taking: Reported on 11/10/2020) 20 tablet 0     No current facility-administered medications for this visit. Allergies  No Known Allergies     REVIEW OF SYSTEMS:     Review of Systems   Constitutional: Positive for fatigue. Negative for chills, diaphoresis, fever and unexpected weight change. HENT: Negative for congestion, ear discharge, ear pain, hearing loss, sinus pain and sore throat. Eyes: Negative for photophobia, pain, discharge, redness and visual disturbance. Respiratory: Negative for cough and shortness of breath. Cardiovascular: Negative for chest pain, palpitations and leg swelling. Gastrointestinal: Negative for abdominal pain, constipation, diarrhea, nausea and vomiting. Endocrine: Negative for polydipsia and polyuria. Genitourinary: Negative for difficulty urinating and hematuria. Musculoskeletal: Negative for neck pain. Skin: Negative for pallor and rash. Neurological: Positive for seizures. Negative for dizziness, tremors, syncope, facial asymmetry, speech difficulty, weakness, light-headedness, numbness and headaches. Hematological: Does not bruise/bleed easily. Psychiatric/Behavioral: Positive for confusion, decreased concentration, dysphoric mood and sleep disturbance. Negative for agitation, behavioral problems and hallucinations. The patient is nervous/anxious.          VITALS  /76 (Site: Left Upper Arm, Position: Sitting, Cuff Size: Medium PRIOR TESTS AND IMAGING: Following images and Labs were reviewed by the examiner       MRI brain with and without contrast 4/24/2019  Impression   Changes of partial left temporal lobectomy with mild gliosis surrounding the   resection cavity.  No residual mass or abnormal enhancement. Routine EEG: none        ASSESSMENT / PLAN:     Harpla Dickerson is a 22 y.o. right handed  female  was seen in the clinic for seizures    · Localization-related epilepsy status post left temporal lobe mass resection in 2015. · History of breakthrough seizures secondary to medication noncompliance  · MRI of the brain showed left temporal gliosis and lobectomy without any residual mass or abnormal enhancement. · Anxiety  · Asthma  · Current smoker  · Marijuana use      Plan:  - Medications: We will increase the dose of Keppra from  1000 mg twice a day  to 1500 mg twice a day as patient endorses new focal seizures since last 3 to 4 months.    -We will evaluate further with MRI of brain with and without contrast to follow-up on the prior brain mass-was ordered during last visit. Not yet completed. -EEG pending -was ordered during last visit. Not yet completed. Instructed patient to complete MRI of the brain and EEG. She voiced understanding, planning to do the testing during this month. -If patient continues to have more seizures in spite of increasing the dose of Keppra, may consider adding a new AED and do long-term video EEG monitoring for further characterization of the episodes. - Seizure Precautions:   I counseled the patient with regard to seizure precautions for injury prevention and reinforced their rationale. No driving for at least 6 months, no activity at dangerous heights, no operation of dangerous machinery, no baths, no swimming. Caution (preferably accompanied) with cooking or near fires.  The patient expressed understanding.     - Maintain seizure diary  a. seizures that occur, duration and description (or type)  b. any side effects from medications and medication dosage  c. mood or behavioral changes  d. Changes in other medications, or Significant life events    - Counseling on contraception: None    - Behavioral Health: I counseled the patient and discussed my impression that they would benefit from behavioral health care including counseling and/or a psychiatric consultation. She expressed understanding.       - Follow up in the clinic in 4 weeks   - Instructed patient to call the clinic if symptoms worsen or develop any new symptoms. I have spent 25 minutes  with the patient more than 50% of this time was spent reviewing medical records, discussing imaging findings, counseling regarding medications side effects and compliance, healthy habits and coordinating care.   .      Electronically signed by Issac Vaughan MD on 11/10/2020 at 11:30 AM

## 2020-11-18 ENCOUNTER — TELEPHONE (OUTPATIENT)
Dept: NEUROSURGERY | Age: 25
End: 2020-11-18

## 2020-11-18 ENCOUNTER — TELEPHONE (OUTPATIENT)
Dept: ADMINISTRATIVE | Age: 25
End: 2020-11-18

## 2020-11-18 NOTE — TELEPHONE ENCOUNTER
Pt called stating that during her visit, she was asked if she was pregnant. She stated no, but recently found out she was. She has not started the 1500mg Keppra yet (still taking the 1000mg dosage). Pt states she sees doc tmrw to determine how far along she is but was told to inform our office.

## 2020-11-18 NOTE — TELEPHONE ENCOUNTER
Pt took two positive pregnancy test. Also worried about seizure medicine hurting the baby. Please call pt with first available appt.

## 2020-11-18 NOTE — TELEPHONE ENCOUNTER
Call placed to pt advised to call her neurologist in regards to her use of Keppra and also given appointment with PCP tomorrow

## 2020-11-19 ENCOUNTER — OFFICE VISIT (OUTPATIENT)
Dept: FAMILY MEDICINE CLINIC | Age: 25
End: 2020-11-19
Payer: MEDICARE

## 2020-11-19 VITALS
WEIGHT: 145.4 LBS | HEART RATE: 84 BPM | TEMPERATURE: 97.3 F | SYSTOLIC BLOOD PRESSURE: 124 MMHG | DIASTOLIC BLOOD PRESSURE: 72 MMHG | HEIGHT: 62 IN | BODY MASS INDEX: 26.76 KG/M2

## 2020-11-19 LAB
CONTROL: PRESENT
PREGNANCY TEST URINE, POC: POSITIVE

## 2020-11-19 PROCEDURE — 99213 OFFICE O/P EST LOW 20 MIN: CPT | Performed by: STUDENT IN AN ORGANIZED HEALTH CARE EDUCATION/TRAINING PROGRAM

## 2020-11-19 PROCEDURE — 4004F PT TOBACCO SCREEN RCVD TLK: CPT | Performed by: STUDENT IN AN ORGANIZED HEALTH CARE EDUCATION/TRAINING PROGRAM

## 2020-11-19 PROCEDURE — 81025 URINE PREGNANCY TEST: CPT | Performed by: STUDENT IN AN ORGANIZED HEALTH CARE EDUCATION/TRAINING PROGRAM

## 2020-11-19 PROCEDURE — G8419 CALC BMI OUT NRM PARAM NOF/U: HCPCS | Performed by: STUDENT IN AN ORGANIZED HEALTH CARE EDUCATION/TRAINING PROGRAM

## 2020-11-19 PROCEDURE — G8427 DOCREV CUR MEDS BY ELIG CLIN: HCPCS | Performed by: STUDENT IN AN ORGANIZED HEALTH CARE EDUCATION/TRAINING PROGRAM

## 2020-11-19 PROCEDURE — G8484 FLU IMMUNIZE NO ADMIN: HCPCS | Performed by: STUDENT IN AN ORGANIZED HEALTH CARE EDUCATION/TRAINING PROGRAM

## 2020-11-19 RX ORDER — VITAMIN A, VITAMIN C, VITAMIN D-3, VITAMIN E, VITAMIN B-1, VITAMIN B-2, NIACIN, VITAMIN B-6, CALCIUM, IRON, ZINC, COPPER 4000; 120; 400; 22; 1.84; 3; 20; 10; 1; 12; 200; 27; 25; 2 [IU]/1; MG/1; [IU]/1; MG/1; MG/1; MG/1; MG/1; MG/1; MG/1; UG/1; MG/1; MG/1; MG/1; MG/1
1 TABLET ORAL DAILY
Qty: 30 TABLET | Refills: 5 | Status: SHIPPED | OUTPATIENT
Start: 2020-11-19 | End: 2022-10-12 | Stop reason: SDUPTHER

## 2020-11-19 RX ORDER — FOLIC ACID 1 MG/1
1 TABLET ORAL DAILY
Qty: 90 TABLET | Refills: 1 | Status: SHIPPED | OUTPATIENT
Start: 2020-11-19 | End: 2022-02-02 | Stop reason: SDUPTHER

## 2020-11-19 ASSESSMENT — PATIENT HEALTH QUESTIONNAIRE - PHQ9
SUM OF ALL RESPONSES TO PHQ QUESTIONS 1-9: 0
SUM OF ALL RESPONSES TO PHQ QUESTIONS 1-9: 0
SUM OF ALL RESPONSES TO PHQ9 QUESTIONS 1 & 2: 0
SUM OF ALL RESPONSES TO PHQ QUESTIONS 1-9: 0
2. FEELING DOWN, DEPRESSED OR HOPELESS: 0
1. LITTLE INTEREST OR PLEASURE IN DOING THINGS: 0

## 2020-11-19 NOTE — PROGRESS NOTES
Visit Information    Have you changed or started any medications since your last visit including any over-the-counter medicines, vitamins, or herbal medicines? no   Have you stopped taking any of your medications? Is so, why? -  no  Are you having any side effects from any of your medications? - no    Have you seen any other physician or provider since your last visit? yes - Neurology   Have you had any other diagnostic tests since your last visit?  no   Have you been seen in the emergency room and/or had an admission in a hospital since we last saw you?  no   Have you had your routine dental cleaning in the past 6 months?  no     Do you have an active MyChart account? If no, what is the barrier?   Yes    Patient Care Team:  Chadd Farnsworth MD as PCP - General (Family Medicine)  Chadd Farnsworth MD as PCP - [facility]    Medical History Review  Past Medical, Family, and Social History reviewed and does not contribute to the patient presenting condition    Health Maintenance   Topic Date Due    Varicella vaccine (1 of 2 - 2-dose childhood series) 02/11/1996    Pneumococcal 0-64 years Vaccine (1 of 1 - PPSV23) 02/11/2001    HPV vaccine (1 - 2-dose series) 02/11/2006    Cervical cancer screen  02/11/2016    Flu vaccine (1) 09/01/2020    DTaP/Tdap/Td vaccine (2 - Td) 08/17/2027    HIV screen  Completed    Hepatitis A vaccine  Aged Out    Hepatitis B vaccine  Aged Out    Hib vaccine  Aged Out    Meningococcal (ACWY) vaccine  Aged Out

## 2020-11-19 NOTE — PROGRESS NOTES
neck stiffness. Neurological: Negative for dizziness, syncope, weakness and headaches. Seizures+      Objective:    /72 (Site: Left Upper Arm, Position: Sitting, Cuff Size: Medium Adult) Comment: machine  Pulse 84   Temp 97.3 °F (36.3 °C) (Temporal)   Ht 5' 2.01\" (1.575 m)   Wt 145 lb 6.4 oz (66 kg)   BMI 26.59 kg/m²    BP Readings from Last 3 Encounters:   11/19/20 124/72   11/10/20 117/76   06/18/20 112/72     Physical Exam  Constitutional:       General: He is not in acute distress. Appearance: Normal appearance. HENT:      Head: Normocephalic and atraumatic. Cardiovascular:      Rate and Rhythm: Normal rate and regular rhythm. No murmur   Pulmonary:      Effort: Pulmonary effort is normal. No respiratory distress. Breath sounds: Normal breath sounds. No wheezing. Abdominal:      General: Abdomen is flat. Bowel sounds are normal. There is no distension. Palpations: Abdomen is soft. Tenderness: There is no tenderness. Musculoskeletal: Normal range of motion. Right lower leg: No edema. Left lower leg: No edema. Skin:     General: Skin is warm and dry. Neurological:      General: No focal deficit present. Mental Status: He is alert and oriented to person, place, and time. Psychiatric:         Mood and Affect: Mood normal.      Lab Results   Component Value Date    WBC 9.8 04/25/2019    HGB 13.2 04/25/2019    HCT 40.0 04/25/2019     04/25/2019    ALT 25 11/10/2013    AST 21 11/10/2013     04/25/2019    K 3.6 (L) 04/25/2019     04/25/2019    CREATININE 0.80 04/25/2019    BUN 7 04/25/2019    CO2 20 04/25/2019     Lab Results   Component Value Date    CALCIUM 8.2 (L) 04/25/2019     No results found for: LDLCALC, LDLCHOLESTEROL, LDLDIRECT    Assessment:     1. Seizure disorder ([facility] [address]    2. Pregnancy, unspecified gestational age    1. Smoking        Plan:   1.  Seizure disorder ([facility] [address]  - Stable, follows with [address]; advised continue and make appointment follow up with Neuro for new pregnancy. Discussed relatively low/ within normal population risk of birth defects due to [address] to discuss need for MRI with Neuro but FM would not object. There is no evidence for complications in pregnancy due to MRI. However if Gadolinium is required this should be a decision between Ms. Cecilia Hong and her neurologist given the possible risks in pregnancy. 2. Pregnancy, unspecified gestational age  -   - 10w0d; RAISA 2021 based on LMP 10/15/2020  - POCT urine pregnancy  - Prenatal Profile 1; Future  - [facility] Maternal Fetal Medicine, [place]  - folic acid (FOLVITE) 1 MG tablet; Take 1 tablet by mouth daily  Dispense: 90 tablet; Refill: 1  - Prenatal Vit-Fe Fumarate-FA (PRENATAL VITAMIN PLUS LOW IRON) 27-1 MG TABS; Take 1 tablet by mouth daily  Dispense: 30 tablet; Refill: 5  - Given general advise and handouts, avoid NSAIDs/ motrin   - Advised can use ginger for nausea     3. Smoking  - <1/2 ppd cigarettes  - nicotine (NICODERM CQ) 7 MG/24HR; Place 1 patch onto the skin every 24 hours  Dispense: 30 patch; Refill: 3  - nicotine polacrilex (NICORETTE) 2 MG gum; Take 1 each by mouth as needed for Smoking cessation  Dispense: 110 each; Refill: 3    Livia received counseling on the following healthy behaviors: nutrition, exercise and medication adherence    Patient given educational materials : see patient instruction     Discussed use, benefit, and side effects of prescribed medications. Barriers to medicationcompliance addressed. All patient questions answered. Pt voiced understanding. Medications Discontinued During This Encounter   Medication Reason    ondansetron (ZOFRAN) 4 MG tablet LIST CLEANUP    omeprazole (PRILOSEC) 40 MG delayed release capsule LIST CLEANUP    nicotine (NICODERM CQ) 7 MG/24HR LIST CLEANUP       Return in about 4 weeks (around 2020) for F/U 9w gestation .

## 2020-11-19 NOTE — PATIENT INSTRUCTIONS
Thank you for letting us take care of you today. We hope all your questions were addressed. If a question was overlooked or something else comes to mind after you return home, please contact a member of your Care Team listed below. Your Care Team at [place] [address] is Team #1  Sydnee Alexander MD (Faculty)  Ivon Morris (Resident)  Edith Li MD (Resident)  Aly Erwin, HARRIET Ivan [facility] office)  Alla Graves, [facility] (Clinical Practice Manager)  Deb Smith [facility] (Clinical Pharmacist)     Office phone number: [phone]    If you need to get in right away due to illness, please be advised we have \"Same Day\" appointments available Monday-Friday. Please call us at [phone] option #3 to schedule your \"Same Day\" appointment.

## 2020-11-20 NOTE — TELEPHONE ENCOUNTER
Please instruct the patient to continue Keppra current dose for now. Please schedule her LTME sooner, may be next 1-2 weeks for further medication changes. Thanks.

## 2020-11-20 NOTE — PROGRESS NOTES
Attending Physician Statement  I  have discussed the care of Susan Giron including pertinent history and exam findings with the resident. I agree with the assessment, plan and orders as documented by the resident. /72 (Site: Left Upper Arm, Position: Sitting, Cuff Size: Medium Adult) Comment: machine  Pulse 84   Temp 97.3 °F (36.3 °C) (Temporal)   Ht 5' 2.01\" (1.575 m)   Wt 145 lb 6.4 oz (66 kg)   BMI 26.59 kg/m²    BP Readings from Last 3 Encounters:   11/19/20 124/72   11/10/20 117/76   06/18/20 112/72     Wt Readings from Last 3 Encounters:   11/19/20 145 lb 6.4 oz (66 kg)   11/10/20 145 lb (65.8 kg)   06/18/20 150 lb (68 kg)          Diagnosis Orders   1. Seizure disorder ([address]     2. Pregnancy, unspecified gestational age  POCT urine pregnancy    Prenatal Profile 1    [facility] Maternal Fetal Medicine, [place]    folic acid (FOLVITE) 1 MG tablet    Prenatal Vit-Fe Fumarate-FA (PRENATAL VITAMIN PLUS LOW IRON) 27-1 MG TABS    HIV Screen    Urinalysis Reflex to Culture    Sickle Cell Screen   3.  Smoking  nicotine (NICODERM CQ) 7 MG/24HR    nicotine polacrilex (NICORETTE) 2 MG gum     Amparo Kaplan DO 11/20/2020 10:44 AM

## 2020-11-23 ENCOUNTER — TELEPHONE (OUTPATIENT)
Dept: NEUROLOGY | Age: 25
End: 2020-11-23

## 2020-11-23 NOTE — TELEPHONE ENCOUNTER
Patient called in stating she is pregnant and would like to know if she should continue with her new dose of medications? Also should she still get her MRI?

## 2020-11-27 ENCOUNTER — HOSPITAL ENCOUNTER (OUTPATIENT)
Dept: NEUROLOGY | Age: 25
Discharge: HOME | End: 2020-11-27
Payer: MEDICARE

## 2020-11-27 ENCOUNTER — HOSPITAL ENCOUNTER (OUTPATIENT)
Dept: MRI IMAGING | Age: 25
Discharge: HOME | End: 2020-11-29
Payer: MEDICARE

## 2020-11-27 PROCEDURE — 95819 EEG AWAKE AND ASLEEP: CPT | Performed by: PSYCHIATRY & NEUROLOGY

## 2020-11-27 PROCEDURE — 95819 EEG AWAKE AND ASLEEP: CPT

## 2020-11-27 PROCEDURE — 70551 MRI BRAIN STEM W/O DYE: CPT

## 2020-11-27 NOTE — PROCEDURES
moderate focal cortical dysfunction in left frontotemporal regions. No EEG or clinical seizures were noted.        Claudene Lacrosse, MD, [facility], Neurology  Board Certified Epileptologist

## 2020-12-01 ENCOUNTER — TELEPHONE (OUTPATIENT)
Dept: FAMILY MEDICINE CLINIC | Age: 25
End: 2020-12-01

## 2020-12-01 NOTE — PROGRESS NOTES
Ordered LTME for further characterization of her multiple staring episodes. Please schedule the LTME sooner, which will help with managing her AEDs considering her pregnancy . Thank you    Thanks.

## 2020-12-03 ENCOUNTER — TELEPHONE (OUTPATIENT)
Dept: NEUROLOGY | Age: 25
End: 2020-12-03

## 2020-12-03 NOTE — TELEPHONE ENCOUNTER
Nitza Lopez called the office back. She stated that she would need to speak with her employer and then call me back to schedule.

## 2020-12-03 NOTE — TELEPHONE ENCOUNTER
Dr. Kvng Wilkins ordered LTME for the patient. Patient is pregnant so I confirmed with Dr. Deniz Mack that she could be scheduled. Dr. Deniz Mack stated that he was agreeable with this. Call placed to the patient and a message left on her VM asking for a return call.

## 2020-12-08 NOTE — TELEPHONE ENCOUNTER
Call placed to the patient and message left on her VM that I was trying to reach her and will try again tomorrow.

## 2020-12-15 ENCOUNTER — TELEPHONE (OUTPATIENT)
Dept: NEUROLOGY | Age: 25
End: 2020-12-15

## 2020-12-15 NOTE — TELEPHONE ENCOUNTER
----- Message from Lupe Sultana RN sent at 12/2/2020 10:03 AM EST -----  Joseph Paiz, can you look into this for me, Thank you  ----- Message -----  From: Hayes Olguin  Sent: 12/1/2020   9:27 AM EST  To: Khurram Tan MD, Hayes Olguin, #    Margarito Gruber would like this patient scheduled for an LTME soon. Dr. Frank Lizarraga and Dr. Johnathon Verdugo are aware of the patient. Please call Anne Krishnan and help the patient schedule this asap. Thank you. Once schedule let Romvince Marquisevince and I know date.

## 2020-12-15 NOTE — TELEPHONE ENCOUNTER
I spoke with the patient on 12/3/20 to schedule the LTME. Patient stated that she would have to check with her employer and call me back. I called the patient twice on 12/8/20 (11:11 am and 5:12 pm) and then again on 12/10/20. Patient has not returned my call.

## 2021-02-24 ENCOUNTER — TELEPHONE (OUTPATIENT)
Dept: ADMINISTRATIVE | Age: 26
End: 2021-02-24

## 2021-02-24 NOTE — TELEPHONE ENCOUNTER
Pt is requesting a doctor's note to go back to work after being off. She was sent home for having seizures, but she is better and would like to go back to work Friday Feb 26. She would like to be contacted when letter is ready.  [phone]

## 2021-03-05 ENCOUNTER — OFFICE VISIT (OUTPATIENT)
Dept: FAMILY MEDICINE CLINIC | Age: 26
End: 2021-03-05
Payer: MEDICARE

## 2021-03-05 VITALS
HEART RATE: 94 BPM | SYSTOLIC BLOOD PRESSURE: 104 MMHG | DIASTOLIC BLOOD PRESSURE: 68 MMHG | WEIGHT: 148 LBS | BODY MASS INDEX: 27.06 KG/M2

## 2021-03-05 DIAGNOSIS — G40.909 SEIZURE DISORDER (HCC): Primary | ICD-10-CM

## 2021-03-05 PROCEDURE — G8427 DOCREV CUR MEDS BY ELIG CLIN: HCPCS | Performed by: STUDENT IN AN ORGANIZED HEALTH CARE EDUCATION/TRAINING PROGRAM

## 2021-03-05 PROCEDURE — G8419 CALC BMI OUT NRM PARAM NOF/U: HCPCS | Performed by: STUDENT IN AN ORGANIZED HEALTH CARE EDUCATION/TRAINING PROGRAM

## 2021-03-05 PROCEDURE — 99213 OFFICE O/P EST LOW 20 MIN: CPT | Performed by: STUDENT IN AN ORGANIZED HEALTH CARE EDUCATION/TRAINING PROGRAM

## 2021-03-05 PROCEDURE — 4004F PT TOBACCO SCREEN RCVD TLK: CPT | Performed by: STUDENT IN AN ORGANIZED HEALTH CARE EDUCATION/TRAINING PROGRAM

## 2021-03-05 PROCEDURE — G8484 FLU IMMUNIZE NO ADMIN: HCPCS | Performed by: STUDENT IN AN ORGANIZED HEALTH CARE EDUCATION/TRAINING PROGRAM

## 2021-03-05 RX ORDER — LEVETIRACETAM 750 MG/1
1500 TABLET ORAL 2 TIMES DAILY
Qty: 360 TABLET | Refills: 0 | Status: SHIPPED | OUTPATIENT
Start: 2021-03-05 | End: 2021-07-02 | Stop reason: SDUPTHER

## 2021-03-05 ASSESSMENT — PATIENT HEALTH QUESTIONNAIRE - PHQ9
2. FEELING DOWN, DEPRESSED OR HOPELESS: 0
SUM OF ALL RESPONSES TO PHQ QUESTIONS 1-9: 0
SUM OF ALL RESPONSES TO PHQ QUESTIONS 1-9: 0

## 2021-03-05 ASSESSMENT — ENCOUNTER SYMPTOMS
NAUSEA: 0
CONSTIPATION: 0
DIARRHEA: 0
ABDOMINAL PAIN: 0
VOMITING: 0
COUGH: 0
WHEEZING: 0
RHINORRHEA: 0
BACK PAIN: 0
SHORTNESS OF BREATH: 0
SORE THROAT: 0

## 2021-03-05 NOTE — PROGRESS NOTES
Visit Information    Have you changed or started any medications since your last visit including any over-the-counter medicines, vitamins, or herbal medicines? no   Have you stopped taking any of your medications? Is so, why? -  no  Are you having any side effects from any of your medications? - no    Have you seen any other physician or provider since your last visit?  no   Have you had any other diagnostic tests since your last visit?  no   Have you been seen in the emergency room and/or had an admission in a hospital since we last saw you?  no   Have you had your routine dental cleaning in the past 6 months?  no     Do you have an active MyChart account? If no, what is the barrier?   Yes    Patient Care Team:  Juli Gallegos MD as PCP - General (Family Medicine)  Juli Gallegos MD as PCP - [facility]    Medical History Review  Past Medical, Family, and Social History reviewed and does contribute to the patient presenting condition    Health Maintenance   Topic Date Due    Hepatitis C screen  Never done    Varicella vaccine (1 of 2 - 2-dose childhood series) Never done    Pneumococcal 0-64 years Vaccine (1 of 1 - PPSV23) Never done    HPV vaccine (1 - 2-dose series) Never done    COVID-19 Vaccine (1 of 2) Never done    Cervical cancer screen  Never done    Flu vaccine (1) Never done    DTaP/Tdap/Td vaccine (2 - Td) 08/17/2027    HIV screen  Completed    Hepatitis A vaccine  Aged Out    Hepatitis B vaccine  Aged Out    Hib vaccine  Aged Out    Meningococcal (ACWY) vaccine  Aged Out

## 2021-03-05 NOTE — PROGRESS NOTES
Attending Physician Statement  I have discussed the care of Carolina Caban, including pertinent history and exam findings,  with the resident. I have reviewed the key elements of all parts of the encounter with the resident. I agree with the assessment, plan and orders as documented by the resident.   (GE Modifier)    Fely Salgado

## 2021-03-05 NOTE — LETTER
[address]  [address]  [facility] [place] [address]  Phone: [phone]  Fax: [phone]    José Miguel Lynch MD        March 5, 2021     Patient: Herb Luna   YOB: 1995   Date of Visit: 3/5/2021       To Whom It May Concern: It is my medical opinion that Salty Quiñones may return to work on 3/6/2021. If you have any questions or concerns, please don't hesitate to call.     Sincerely,        José Miguel Lynch MD

## 2021-03-05 NOTE — PROGRESS NOTES
[address] [facility] Medicine Residency Program - Outpatient Note      Carolina Caban is a 32 y.o. female Established patient, presented to the office today for:  Chief Complaint   Patient presents with    Seizures     last one this morning - lasted longer than 2 minutes - wanted to check on baby (5Months preg)    Emesis         ASSESSMENT/PLAN:    1. Seizure disorder ([facility] [address]  -Patient's dosage was increased to 1500 mg twice daily by her neurologist and was approved by her obstetrician, however patient did not end up getting this new prescription and has only been taking 1000 mg twice daily. Will give the increased dose patient will follow up with neurology. - levETIRAcetam (KEPPRA) 750 MG tablet; Take 2 tablets by mouth 2 times daily  Dispense: 360 tablet; Refill: 0          Requested Prescriptions     Pending Prescriptions Disp Refills    levETIRAcetam (KEPPRA) 750 MG tablet 120 tablet 3     Sig: Take 2 tablets by mouth 2 times daily       Medications Discontinued During This Encounter   Medication Reason    levETIRAcetam (KEPPRA) 750 MG tablet REORDER       Return in about 3 months (around 6/5/2021), or if symptoms worsen or fail to improve. SUBJECTIVE/OBJECTIVE:      Carolina Caban is a 32 y.o. Augustin Collins  has a past medical history of Anxiety, Asthma, and Seizures ([facility] [address] HPI    Established patient, 5 months pregnant, history of focalized seizures. Patient has absence seizures and occasional tonic-clonic seizures at night when she is asleep. Patient was recently seen by neurology and Keppra dosage was recommended to increase to 1500 mg twice daily. Patient did not receive the new prescription and patient has only take 1000 mg twice daily. Patient had a absence seizure this morning. Patient states that she spoke with her OB about this and she was approved to take [address] during pregnancy. Patient denies any leakage of fluids, no blood from vagina, no contractions.       Review of Systems   Constitutional: Negative for chills, diaphoresis and fever. HENT: Negative for congestion, rhinorrhea and sore throat. Respiratory: Negative for cough, shortness of breath and wheezing. Cardiovascular: Negative for chest pain, palpitations and leg swelling. Gastrointestinal: Negative for abdominal pain, constipation, diarrhea, nausea and vomiting. Musculoskeletal: Negative for arthralgias, back pain and myalgias. Skin: Negative for rash. Neurological: Positive for seizures. Negative for dizziness, light-headedness and headaches. The patient has a   Family History   Problem Relation Age of Onset    Asthma Mother     Depression Mother     High Blood Pressure Mother        /68 (Site: Left Upper Arm, Position: Sitting, Cuff Size: Medium Adult)   Pulse 94   Wt 148 lb (67.1 kg)   BMI 27.06 kg/m²    BP Readings from Last 3 Encounters:   03/05/21 104/68   11/19/20 124/72   11/10/20 117/76       Physical Exam  Vitals signs and nursing note reviewed. Constitutional:       General: She is not in acute distress. Eyes:      Extraocular Movements: Extraocular movements intact. Conjunctiva/sclera: Conjunctivae normal.   Cardiovascular:      Rate and Rhythm: Normal rate and regular rhythm. Pulses: Normal pulses. Heart sounds: Normal heart sounds. Pulmonary:      Effort: Pulmonary effort is normal.      Breath sounds: Normal breath sounds. Abdominal:      General: Bowel sounds are normal. There is no distension. Palpations: Abdomen is soft. Tenderness: There is no abdominal tenderness. There is no guarding. Comments: Gravid uterus, nontender to palpation   Musculoskeletal:      Right lower leg: No edema. Left lower leg: No edema. Neurological:      General: No focal deficit present. Mental Status: She is alert.          Lab Results   Component Value Date    WBC 9.8 04/25/2019    HGB 13.2 04/25/2019    HCT 40.0 04/25/2019     04/25/2019    ALT 25 11/10/2013    AST 21 11/10/2013     04/25/2019    K 3.6 (L) 04/25/2019     04/25/2019    CREATININE 0.80 04/25/2019    BUN 7 04/25/2019    CO2 20 04/25/2019     Lab Results   Component Value Date    CALCIUM 8.2 (L) 04/25/2019     No results found for: LDLCALC, LDLCHOLESTEROL, LDLDIRECT       All patient questions answered. Pt voiced understanding. Melissa Morgan MD  Family Medicine PGY-2  03/05/21 at 2:24 PM      Disclaimer: Some orall of this note was transcribed using voice-recognition software. This may cause typographical errors occasionally. Although all effort is made to fix these errors, please do not hesitate to contact our office if there Elton Oreilly concern with the understanding of this note. An electronic signature was used to authenticate this note.

## 2021-03-08 ENCOUNTER — TELEPHONE (OUTPATIENT)
Dept: FAMILY MEDICINE CLINIC | Age: 26
End: 2021-03-08

## 2021-04-06 ENCOUNTER — TELEPHONE (OUTPATIENT)
Dept: FAMILY MEDICINE CLINIC | Age: 26
End: 2021-04-06

## 2021-04-06 NOTE — TELEPHONE ENCOUNTER
Called pt - said she lives at . [address] [place] [address], she receives mail at [address] [place] [address] [address] [address].

## 2021-04-06 NOTE — TELEPHONE ENCOUNTER
Called pt left v-message to verify correct address, chart address different form address on [employer] form? ?

## 2021-04-20 ENCOUNTER — TELEPHONE (OUTPATIENT)
Dept: FAMILY MEDICINE CLINIC | Age: 26
End: 2021-04-20

## 2021-04-20 ENCOUNTER — NURSE TRIAGE (OUTPATIENT)
Dept: OTHER | Facility: CLINIC | Age: 26
End: 2021-04-20

## 2021-04-20 NOTE — TELEPHONE ENCOUNTER
Received call from Keith Don at [facility] [place] with [employer] Complaint. Brief description of triage: see below    Triage indicates for patient to go to ED/UC or to office with NP approval. Charly David spoke with Nixon Johns, NP and she recommended patient be seen today in a red clinic or go to urgent care if no available anamika. Care advice provided, patient verbalizes understanding; denies any other questions or concerns; instructed to call back for any new or worsening symptoms. Writer provided warm transfer to [facility] for appointment scheduling. Attention Provider: Thank you for allowing me to participate in the care of your patient. The patient was connected to triage in response to information provided to the [facility]. Please do not respond through this encounter as the response is not directed to a shared pool. Reason for Disposition   MILD difficulty breathing (e.g., minimal/no SOB at rest, SOB with walking, pulse <100)    Answer Assessment - Initial Assessment Questions  1. COVID-19 DIAGNOSIS: \"Who made your Coronavirus (COVID-19) diagnosis? \" \"Was it confirmed by a positive lab test?\" If not diagnosed by a HCP, ask \"Are there lots of cases (community spread) where you live? \" (See public health department website, if unsure)      No dx as of yet    2. COVID-19 EXPOSURE: \"Was there any known exposure to COVID before the symptoms began? \" CDC Definition of close contact: within 6 feet (2 meters) for a total of 15 minutes or more over a 24-hour period. Denies    3. ONSET: \"When did the COVID-19 symptoms start? \"       3-4 days ago    4. WORST SYMPTOM: \"What is your worst symptom? \" (e.g., cough, fever, shortness of breath, muscle aches)      Cough and SOB    5. COUGH: \"Do you have a cough? \" If so, ask: \"How bad is the cough? \"        Cough- dry cough    6. FEVER: \"Do you have a fever? \" If so, ask: \"What is your temperature, how was it measured, and when did it start? \" Denies    7. RESPIRATORY STATUS: \"Describe your breathing? \" (e.g., shortness of breath, wheezing, unable to speak)       Yes when walking up stairs    8. BETTER-SAME-WORSE: Radha Chavez you getting better, staying the same or getting worse compared to yesterday? \"  If getting worse, ask, \"In what way? \"      Worse    9. HIGH RISK DISEASE: \"Do you have any chronic medical problems? \" (e.g., asthma, heart or lung disease, weak immune system, obesity, etc.)      Denies    10. PREGNANCY: \"Is there any chance you are pregnant? \" \"When was your last menstrual period? \"        Patient is pregnant- 27 weeks    11. OTHER SYMPTOMS: \"Do you have any other symptoms? \"  (e.g., chills, fatigue, headache, loss of smell or taste, muscle pain, sore throat; new loss of smell or taste especially support the diagnosis of COVID-19)        Nausea    Protocols used: CORONAVIRUS (COVID-19) DIAGNOSED OR SUSPECTED-[facility]

## 2021-04-20 NOTE — TELEPHONE ENCOUNTER
Return call from Piotr/NT pt is having covid type symptoms including cough and SOB. Per NT I have given Flu clinic information to pt since practice does not offer red clinic. Pt given  V Flu clinic location and contact information. She said this would work for her.

## 2021-06-01 ENCOUNTER — TELEPHONE (OUTPATIENT)
Dept: FAMILY MEDICINE CLINIC | Age: 26
End: 2021-06-01

## 2021-06-01 NOTE — TELEPHONE ENCOUNTER
FYI-    6/01/21 [employer] utility form faxed to [facility] office, will get signature from attending and fax back.

## 2021-07-02 DIAGNOSIS — G40.909 SEIZURE DISORDER (HCC): ICD-10-CM

## 2021-07-02 RX ORDER — LEVETIRACETAM 750 MG/1
1500 TABLET ORAL 2 TIMES DAILY
Qty: 360 TABLET | Refills: 0 | Status: SHIPPED | OUTPATIENT
Start: 2021-07-02 | End: 2021-12-15 | Stop reason: SDUPTHER

## 2021-07-02 NOTE — ED NOTES
Patient began to desat on monitor, when the writer entered the room the patient was seizing. Eyes open, full body jerking, copious secretions and the patient had bit her tongue. She was placed on her side, her secretions were suctioned and she was placed on a non-rebreather. Yon Crawford and Dr. Kaylan Zhou came to bedside. Ativan was given at [address] and her seizure ceased within 30 seconds. Total time seizing was roughly 2 minutes.       Pepe Murphy RN  04/24/19 5855 Subjective:  HPI  Physical Exam                    Objective:  System    Physical Exam    General     ROS    Assessment/Plan:    PROGRESS  REPORT    Progress reporting period is from 5/28/21 to 7/2/21.       SUBJECTIVE  Pt reports shoulder is good    Current Pain level: 0/10.     Initial Pain level: 3/10.   Changes in function:  Yes (See Goal flowsheet attached for changes in current functional level)  Adverse reaction to treatment or activity: None    OBJECTIVE  AROM flex 135, ABd 141, ER 75, ext/IR T8,shoulder flexion 3+/5, abd 3/5, ER 4/5, IR 4/5.      ASSESSMENT/PLAN  Updated problem list and treatment plan: Diagnosis 1:  Left shoulder pain s/p hemiarthroplasty  Decreased ROM/flexibility - manual therapy, therapeutic exercise, therapeutic activity and home program  Decreased joint mobility - manual therapy, therapeutic exercise, therapeutic activity and home program  Decreased strength - therapeutic exercise  STG/LTGs have been met or progress has been made towards goals:  Yes (See Goal flow sheet completed today.)  Assessment of Progress: The patient's condition is improving.  Self Management Plans:  Patient is independent in a home treatment program.  I have re-evaluated this patient and find that the nature, scope, duration and intensity of the therapy is appropriate for the medical condition of the patient.  Arpita continues to require the following intervention to meet STG and LTG's:  PT    Recommendations:  This patient would benefit from continued therapy.     Frequency:  2 X a month, once daily  Duration:  for 4 visits        Please refer to the daily flowsheet for treatment today, total treatment time and time spent performing 1:1 timed codes.

## 2021-07-02 NOTE — TELEPHONE ENCOUNTER
Patient is due 7/8/2021 unable to steven appointment at this time patient states. Almost out of medication. Please address the medication refill and close the encounter. If I can be of assistance, please route to the applicable pool. Thank you. Last visit: 3-5-21  Last Med refill: 3/5/21  Does patient have enough medication for 72 hours: No:     Next Visit Date:  No future appointments.     Health Maintenance   Topic Date Due    Hepatitis C screen  Never done    Varicella vaccine (1 of 2 - 2-dose childhood series) Never done    Pneumococcal 0-64 years Vaccine (1 of 2 - PPSV23) Never done    HPV vaccine (1 - 2-dose series) Never done    COVID-19 Vaccine (1) Never done    Cervical cancer screen  Never done    Flu vaccine (1) 09/01/2021    DTaP/Tdap/Td vaccine (2 - Td or Tdap) 08/17/2027    HIV screen  Completed    Hepatitis A vaccine  Aged Out    Hepatitis B vaccine  Aged Out    Hib vaccine  Aged Out    Meningococcal (ACWY) vaccine  Aged Out       No results found for: LABA1C          ( goal A1C is < 7)   No results found for: LABMICR  No results found for: LDLCHOLESTEROL, LDLCALC    (goal LDL is <100)   AST (U/L)   Date Value   11/10/2013 21     ALT (U/L)   Date Value   11/10/2013 25     BUN (mg/dL)   Date Value   04/25/2019 7     BP Readings from Last 3 Encounters:   03/05/21 104/68   11/19/20 124/72   11/10/20 117/76          (goal 120/80)    All Future Testing planned in [facility]  Lab Frequency Next Occurrence   Prenatal Profile 1 Once 11/19/2021   HIV Screen Once 11/19/2021   Urinalysis Reflex to Culture Once 11/19/2021   Sickle Cell Screen Once 11/19/2021               Patient Active Problem List:     Encounter to establish care     Seizure disorder ([address]     Trying to get pregnant     Otalgia     Acute suppurative otitis media of left ear without spontaneous rupture of tympanic membrane     Breakthrough seizure ([address]     Bacterial vaginosis     Urinary tract infection without hematuria     Seizure ([facility]     Localization-related epilepsy ([facility]     History of brain surgery     Asthma     Gastroesophageal reflux disease     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

## 2021-09-02 ENCOUNTER — HOSPITAL ENCOUNTER (EMERGENCY)
Age: 26
Discharge: HOME | End: 2021-09-02
Attending: EMERGENCY MEDICINE
Payer: MEDICARE

## 2021-09-02 VITALS
HEIGHT: 62 IN | RESPIRATION RATE: 18 BRPM | SYSTOLIC BLOOD PRESSURE: 128 MMHG | BODY MASS INDEX: 23.92 KG/M2 | OXYGEN SATURATION: 100 % | DIASTOLIC BLOOD PRESSURE: 80 MMHG | WEIGHT: 130 LBS | HEART RATE: 62 BPM | TEMPERATURE: 98.2 F

## 2021-09-02 DIAGNOSIS — L25.9 CONTACT DERMATITIS, UNSPECIFIED CONTACT DERMATITIS TYPE, UNSPECIFIED TRIGGER: Primary | ICD-10-CM

## 2021-09-02 PROCEDURE — 99281 EMR DPT VST MAYX REQ PHY/QHP: CPT

## 2021-09-02 RX ORDER — DIPHENHYDRAMINE HCL 25 MG
25 CAPSULE ORAL EVERY 4 HOURS PRN
Qty: 1 CAPSULE | Refills: 0 | Status: SHIPPED | OUTPATIENT
Start: 2021-09-02 | End: 2022-02-02

## 2021-09-02 ASSESSMENT — ENCOUNTER SYMPTOMS
NAUSEA: 0
WHEEZING: 0
VOMITING: 0
SHORTNESS OF BREATH: 0

## 2021-09-02 NOTE — ED PROVIDER NOTES
Exercise per Session:    Stress:     Feeling of Stress :    Social Connections:     Frequency of Communication with Friends and Family:     Frequency of Social Gatherings with Friends and Family:     Attends [employer] Services:     Active Member of Clubs or Organizations:     Attends Club or Organization Meetings:     Marital Status:    Intimate Partner Violence:     Fear of Current or Ex-Partner:     Emotionally Abused:     Physically Abused:     Sexually Abused:        Family History   Problem Relation Age of Onset    Asthma Mother     Depression Mother     High Blood Pressure Mother        Allergies:  Patient has no known allergies. Home Medications:  Prior to Admission medications    Medication Sig Start Date End Date Taking? Authorizing Provider   levETIRAcetam (KEPPRA) 750 MG tablet Take 2 tablets by mouth 2 times daily 7/2/21 9/30/21  Odin Galindo MD   nicotine (NICODERM CQ) 7 MG/24HR Place 1 patch onto the skin every 24 hours 11/19/20 11/19/21  Hoa Rivera MD   nicotine polacrilex (NICORETTE) 2 MG gum Take 1 each by mouth as needed for Smoking cessation 11/19/20   Hoa Rivera MD   folic acid (FOLVITE) 1 MG tablet Take 1 tablet by mouth daily 11/19/20   Hoa Rivera MD   Prenatal Vit-Fe Fumarate-FA (PRENATAL VITAMIN PLUS LOW IRON) 27-1 MG TABS Take 1 tablet by mouth daily 11/19/20   Hoa Rivera MD   albuterol sulfate HFA (VENTOLIN HFA) 108 (90 Base) MCG/ACT inhaler Inhale 2 puffs into the lungs every 6 hours as needed for Wheezing 6/3/20   Jefry Sheehan MD   sertraline (ZOLOFT) 25 MG tablet Take 1 tablet by mouth daily 7/12/19   Flower Brothers MD   cetirizine (ZYRTEC ALLERGY) 10 MG tablet Take 1 tablet by mouth daily 7/12/19   Flower Brothers MD       REVIEW OF SYSTEMS    (2-9 systems for level 4, 10 or more for level 5)      Review of Systems   Respiratory: Negative for shortness of breath and wheezing. Gastrointestinal: Negative for nausea and vomiting.    Skin: Positive for rash. PHYSICAL EXAM   (up to 7 for level 4, 8 or more for level 5)      INITIAL VITALS:   There were no vitals taken for this visit. There were no vitals filed for this visit. Physical Exam  Constitutional:       Appearance: Normal appearance. Pulmonary:      Effort: No respiratory distress. Skin:     General: Skin is warm and dry. Findings: No rash. Neurological:      General: No focal deficit present. Mental Status: She is alert and oriented to person, place, and time. DIFFERENTIAL  DIAGNOSIS     PLAN (LABS / IMAGING / EKG):  No orders of the defined types were placed in this encounter. MEDICATIONS ORDERED:  No orders of the defined types were placed in this encounter. DIAGNOSTIC RESULTS / EMERGENCY DEPARTMENT COURSE / MDM   LAB RESULTS:  No results found for this visit on 09/02/21. RADIOLOGY:  No orders to display        [place]:    Patient here with a rash, per pictures on the phone appears to be contact him with Katie Sinclair, slightly raised wheals on the erythematous base. It was pruritic. No known allergen. Discharged to follow-up with PCP for possible allergen testing. Patient states she has Benadryl to take if rash returns. PROCEDURES:      CONSULTS:  None    CRITICAL CARE:  Please see attending note    FINAL IMPRESSION      1. Contact dermatitis, unspecified contact dermatitis type, unspecified trigger          DISPOSITION / PLAN     DISPOSITION Decision To Discharge 09/02/2021 11:58:40 AM      PATIENT REFERRED TO:  Maynor Gonzalez MD  [address]   [address]  [id number]  [phone]            DISCHARGE MEDICATIONS:  New Prescriptions    No medications on file       Nataly Denton DO  Emergency Medicine Resident    (Please note that portions of thisnote were completed with a voice recognition program.  Efforts were made to edit the dictations but occasionally words are mis-transcribed.)        Abdi Lorenzo Maribeth Olivo DO  Resident  09/02/21 8451

## 2021-09-02 NOTE — ED NOTES
Bed: 45  Expected date:   Expected time:   Means of arrival:   Comments:  No bed     [facility]  09/02/21 1152

## 2021-09-02 NOTE — ED PROVIDER NOTES
[facility]  Emergency Department  Faculty Attestation     I performed a history and physical examination of the patient and discussed management with the resident. I reviewed the residents note and agree with the documented findings and plan of care. Any areas of disagreement are noted on the chart. I was personally present for the key portions of any procedures. I have documented in the chart those procedures where I was not present during the key portions. I have reviewed the emergency nurses triage note. I agree with the chief complaint, past medical history, past surgical history, allergies, medications, social and family history as documented unless otherwise noted below. For Physician Assistant/ Nurse Practitioner cases/documentation I have personally evaluated this patient and have completed at least one if not all key elements of the E/M (history, physical exam, and MDM). Additional findings are as noted. Primary Care Physician:  Romeo Guardado MD    Screenings:  [unfilled]    CHIEF COMPLAINT     No chief complaint on file. RECENT VITALS:   Temp: 98.2 °F (36.8 °C),  Pulse: 62, Resp: 18, BP: 128/80    LABS:  Labs Reviewed - No data to display    Radiology  No orders to display         Attending Physician Additional  Notes    Patient had itchy red bumps on both eyes. She is under significant amount of stress recently with financial bills after losing her child. She is had hives previously. No new exposures. She is not yet used antihistamines but like a prescription for this. No wheezing or shortness of breath. No angioedema. On exam she nontoxic afebrile vital signs normal.  The hives which were seen on her cell phone camera have resolved. No respiratory distress. Impression is urticaria, resolved. Plan is Benadryl as needed, routine follow-up. Tamika Mena.  Rhett Cassidy MD, [facility]  Attending Emergency  Physician               Tamika Mena Gilbert Saleem MD  09/02/21 8108

## 2021-09-17 ENCOUNTER — TELEPHONE (OUTPATIENT)
Dept: FAMILY MEDICINE CLINIC | Age: 26
End: 2021-09-17

## 2021-09-17 ENCOUNTER — NURSE TRIAGE (OUTPATIENT)
Dept: OTHER | Facility: CLINIC | Age: 26
End: 2021-09-17

## 2021-09-17 NOTE — TELEPHONE ENCOUNTER
Patient is clearly not therapeutic on her anti-epileptic meds, she should NOT return to work until she has been evaluated, and should not be driving until this has been assessed. She should be in touch with her neurologist and should be seen in the ED if she has another seizure prior to our visit. I will call the patient to explain. Reminded Yamile of follow up lab scheduled 9/8/17

## 2021-09-17 NOTE — TELEPHONE ENCOUNTER
Patient called and states she had a seizure last night 09/16/21. She has an appointment with you on Monday 09/20/21 but is requesting a back to work letter for [email or web address] . She works at [employer] and the fax number to send the letter if approved is [phone].  Thank you Patient states that this is an urgent matter, please call patient to advise. [phone]

## 2021-09-17 NOTE — TELEPHONE ENCOUNTER
Reason for Disposition   Seizure lasting < 5 minutes with a history of prior seizure(s), and taking anticonvulsants    Answer Assessment - Initial Assessment Questions  1. ONSET: \"How long did the seizure last?\" (Minutes)       Close to 5 mins pe rpt    2. CONTENT: \"Describe what happened during the seizure. Did the body become stiff? Was there any jerking? \"      Absent seizure, no jerking per pt    3. CIRCUMSTANCE: \"What was the individual doing when the seizure began? \"       Just lying down watching TB    4. MENTAL STATUS: \"Does he know who he is, who you are, and where he is? \"       A/O now    5. PRIOR SEIZURES: \"Has the individual had a seizure (convulsion) before? \" If so, ask: \"When was the last time? \" and \"What happened last time? \"     During pregnancy in July    6. EPILEPSY: \"Does the individual have epilepsy? \" (note: check for medical ID clay)      Yes    7. MEDICATIONS: \"Does the individual take anticonvulsant medications? \" (e.g., yes/no, compliance, any recent changes)      Keppra    8. INJURY: \"Did the individual hurt himself during the seizure? \" (e.g., head, tongue)      Denies    9. OTHER SYMPTOMS: Alver [place] there any other symptoms? \" (e.g., fever, headache)      Headache that resolved after seizure per pt    10. PREGNANCY: \"Is there any chance you are pregnant? \" \"When was your last menstrual period? \"       Denies. Recent fetal demise.     Protocols used: [facility]
[place] for appointment scheduling. Attention Provider: Thank you for allowing me to participate in the care of your patient. The patient was connected to triage in response to information provided to the ECC. Please do not respond through this encounter as the response is not directed to a shared pool.

## 2021-09-20 NOTE — TELEPHONE ENCOUNTER
Writer spoke with patient and she states she is not actually having seizures she is just staring and then she comes out of it.  Patient was not happy with Physicians reply and advised patient to discuss at today's appointment

## 2021-10-06 ENCOUNTER — NURSE TRIAGE (OUTPATIENT)
Dept: OTHER | Facility: CLINIC | Age: 26
End: 2021-10-06

## 2021-10-06 ENCOUNTER — APPOINTMENT (OUTPATIENT)
Dept: GENERAL RADIOLOGY | Age: 26
End: 2021-10-06
Payer: MEDICARE

## 2021-10-06 ENCOUNTER — HOSPITAL ENCOUNTER (EMERGENCY)
Age: 26
Discharge: HOME | End: 2021-10-06
Attending: EMERGENCY MEDICINE
Payer: MEDICARE

## 2021-10-06 VITALS
DIASTOLIC BLOOD PRESSURE: 84 MMHG | RESPIRATION RATE: 18 BRPM | OXYGEN SATURATION: 99 % | HEART RATE: 90 BPM | TEMPERATURE: 97.3 F | SYSTOLIC BLOOD PRESSURE: 138 MMHG

## 2021-10-06 DIAGNOSIS — E34.9 ELEVATED SERUM HCG: ICD-10-CM

## 2021-10-06 DIAGNOSIS — R07.9 CHEST PAIN, UNSPECIFIED TYPE: Primary | ICD-10-CM

## 2021-10-06 LAB
ABSOLUTE EOS #: 0.05 K/UL (ref 0–0.44)
ABSOLUTE IMMATURE GRANULOCYTE: 0.05 K/UL (ref 0–0.3)
ABSOLUTE LYMPH #: 2.53 K/UL (ref 1.1–3.7)
ABSOLUTE MONO #: 0.65 K/UL (ref 0.1–1.2)
ANION GAP SERPL CALCULATED.3IONS-SCNC: 16 MMOL/L (ref 9–17)
BASOPHILS # BLD: 0 % (ref 0–2)
BASOPHILS ABSOLUTE: 0.05 K/UL (ref 0–0.2)
BUN BLDV-MCNC: 9 MG/DL (ref 6–20)
BUN/CREAT BLD: ABNORMAL (ref 9–20)
CALCIUM SERPL-MCNC: 9 MG/DL (ref 8.6–10.4)
CHLORIDE BLD-SCNC: 102 MMOL/L (ref 98–107)
CO2: 19 MMOL/L (ref 20–31)
CREAT SERPL-MCNC: 0.87 MG/DL (ref 0.5–0.9)
DIFFERENTIAL TYPE: ABNORMAL
EKG ATRIAL RATE: 75 BPM
EKG P AXIS: 53 DEGREES
EKG P-R INTERVAL: 122 MS
EKG Q-T INTERVAL: 388 MS
EKG QRS DURATION: 76 MS
EKG QTC CALCULATION (BAZETT): 433 MS
EKG R AXIS: 38 DEGREES
EKG T AXIS: 35 DEGREES
EKG VENTRICULAR RATE: 75 BPM
EOSINOPHILS RELATIVE PERCENT: 0 % (ref 1–4)
GFR AFRICAN AMERICAN: >60 ML/MIN
GFR NON-AFRICAN AMERICAN: >60 ML/MIN
GFR SERPL CREATININE-BSD FRML MDRD: ABNORMAL ML/MIN/{1.73_M2}
GFR SERPL CREATININE-BSD FRML MDRD: ABNORMAL ML/MIN/{1.73_M2}
GLUCOSE BLD-MCNC: 61 MG/DL (ref 70–99)
HCG QUALITATIVE: POSITIVE
HCG QUANTITATIVE: 8 IU/L
HCT VFR BLD CALC: 42.8 % (ref 36.3–47.1)
HEMOGLOBIN: 14.1 G/DL (ref 11.9–15.1)
IMMATURE GRANULOCYTES: 0 %
LYMPHOCYTES # BLD: 22 % (ref 24–43)
MCH RBC QN AUTO: 31.2 PG (ref 25.2–33.5)
MCHC RBC AUTO-ENTMCNC: 32.9 G/DL (ref 28.4–34.8)
MCV RBC AUTO: 94.7 FL (ref 82.6–102.9)
MONOCYTES # BLD: 6 % (ref 3–12)
NRBC AUTOMATED: 0 PER 100 WBC
PDW BLD-RTO: 13.5 % (ref 11.8–14.4)
PLATELET # BLD: 256 K/UL (ref 138–453)
PLATELET ESTIMATE: ABNORMAL
PMV BLD AUTO: 10.1 FL (ref 8.1–13.5)
POTASSIUM SERPL-SCNC: 3.8 MMOL/L (ref 3.7–5.3)
RBC # BLD: 4.52 M/UL (ref 3.95–5.11)
RBC # BLD: ABNORMAL 10*6/UL
SEG NEUTROPHILS: 72 % (ref 36–65)
SEGMENTED NEUTROPHILS ABSOLUTE COUNT: 8.45 K/UL (ref 1.5–8.1)
SODIUM BLD-SCNC: 137 MMOL/L (ref 135–144)
TROPONIN INTERP: NORMAL
TROPONIN T: NORMAL NG/ML
TROPONIN, HIGH SENSITIVITY: <6 NG/L (ref 0–14)
WBC # BLD: 11.8 K/UL (ref 3.5–11.3)
WBC # BLD: ABNORMAL 10*3/UL

## 2021-10-06 PROCEDURE — 84484 ASSAY OF TROPONIN QUANT: CPT

## 2021-10-06 PROCEDURE — 99282 EMERGENCY DEPT VISIT SF MDM: CPT

## 2021-10-06 PROCEDURE — 85025 COMPLETE CBC W/AUTO DIFF WBC: CPT

## 2021-10-06 PROCEDURE — U0005 INFEC AGEN DETEC AMPLI PROBE: HCPCS

## 2021-10-06 PROCEDURE — 84703 CHORIONIC GONADOTROPIN ASSAY: CPT

## 2021-10-06 PROCEDURE — 71045 X-RAY EXAM CHEST 1 VIEW: CPT

## 2021-10-06 PROCEDURE — 93010 ELECTROCARDIOGRAM REPORT: CPT | Performed by: INTERNAL MEDICINE

## 2021-10-06 PROCEDURE — 84702 CHORIONIC GONADOTROPIN TEST: CPT

## 2021-10-06 PROCEDURE — 80048 BASIC METABOLIC PNL TOTAL CA: CPT

## 2021-10-06 PROCEDURE — U0003 INFECTIOUS AGENT DETECTION BY NUCLEIC ACID (DNA OR RNA); SEVERE ACUTE RESPIRATORY SYNDROME CORONAVIRUS 2 (SARS-COV-2) (CORONAVIRUS DISEASE [COVID-19]), AMPLIFIED PROBE TECHNIQUE, MAKING USE OF HIGH THROUGHPUT TECHNOLOGIES AS DESCRIBED BY CMS-2020-01-R: HCPCS

## 2021-10-06 PROCEDURE — 93005 ELECTROCARDIOGRAM TRACING: CPT | Performed by: STUDENT IN AN ORGANIZED HEALTH CARE EDUCATION/TRAINING PROGRAM

## 2021-10-06 ASSESSMENT — ENCOUNTER SYMPTOMS
COUGH: 0
NAUSEA: 0
SORE THROAT: 0
SHORTNESS OF BREATH: 0
ABDOMINAL PAIN: 0
DIARRHEA: 0
VOMITING: 0
RHINORRHEA: 0

## 2021-10-06 NOTE — ED NOTES
Bed: 29  Expected date:   Expected time:   Means of arrival:   Comments:     Kwabena Arguelles RN  10/06/21 1257

## 2021-10-06 NOTE — Clinical Note
Lucas Adams was seen and treated in our emergency department on 10/6/2021. She may return to work on 10/07/2021. If you have any questions or concerns, please don't hesitate to call.       Lisbet Delacruz MD

## 2021-10-06 NOTE — ED PROVIDER NOTES
[address] HANDOFF       Handoff taken on the following patient from prior Attending Physician:Dr. Anthony Walters  Pt Name: Saniya Mayela  PCP:  Steve Cabral MD    Attestation  I was available and discussed any additional care issues that arose and coordinated the management plans with the resident(s) caring for the patient during my duty period. Any areas of disagreement with resident's documentation of care or procedures are noted on the chart. I was personally present for the key portions of any/all procedures during my duty period. I have documented in the chart those procedures where I was not present during the key portions.          CHIEF COMPLAINT       Chief Complaint   Patient presents with    Chest Pain     pt startes x3 days         CURRENT MEDICATIONS     Previous Medications  Current Discharge Medication List      CONTINUE these medications which have NOT CHANGED    Details   diphenhydrAMINE (BENADRYL) 25 MG capsule Take 1 capsule by mouth every 4 hours as needed for Itching  Qty: 1 capsule, Refills: 0      levETIRAcetam (KEPPRA) 750 MG tablet Take 2 tablets by mouth 2 times daily  Qty: 360 tablet, Refills: 0    Associated Diagnoses: Seizure disorder (HCC)      nicotine (NICODERM CQ) 7 MG/24HR Place 1 patch onto the skin every 24 hours  Qty: 30 patch, Refills: 3    Associated Diagnoses: Smoking      nicotine polacrilex (NICORETTE) 2 MG gum Take 1 each by mouth as needed for Smoking cessation  Qty: 110 each, Refills: 3    Associated Diagnoses: Smoking      folic acid (FOLVITE) 1 MG tablet Take 1 tablet by mouth daily  Qty: 90 tablet, Refills: 1    Associated Diagnoses: Pregnancy, unspecified gestational age      Prenatal Vit-Fe Fumarate-FA (PRENATAL VITAMIN PLUS LOW IRON) 27-1 MG TABS Take 1 tablet by mouth daily  Qty: 30 tablet, Refills: 5    Associated Diagnoses: Pregnancy, unspecified gestational age      albuterol sulfate HFA (VENTOLIN HFA) 108 (90 Base) MCG/ACT inhaler Inhale 2 puffs into the lungs every 6 hours as needed for Wheezing  Qty: 1 Inhaler, Refills: 3    Associated Diagnoses: Uncomplicated asthma, unspecified asthma severity, unspecified whether persistent      sertraline (ZOLOFT) 25 MG tablet Take 1 tablet by mouth daily  Qty: 30 tablet, Refills: 3    Associated Diagnoses: Anxiety      cetirizine (ZYRTEC ALLERGY) 10 MG tablet Take 1 tablet by mouth daily  Qty: 30 tablet, Refills: 1    Associated Diagnoses: Allergic rhinitis, unspecified seasonality, unspecified trigger             Encounter Medications  No orders of the defined types were placed in this encounter. ALLERGIES     has No Known Allergies. RECENT VITALS:   Temp: 97.3 °F (36.3 °C),  Pulse: 90, Resp: 18, BP: 138/84    RADIOLOGY:   XR CHEST PORTABLE   Final Result   1. No definite radiographic evidence of acute cardiopulmonary disease. 2. Pulmonary sequela typical of that seen with smoking. LABS:  Labs Reviewed   HCG, SERUM, QUALITATIVE - Abnormal; Notable for the following components:       Result Value    hCG Qual POSITIVE (*)     All other components within normal limits   CBC WITH AUTO DIFFERENTIAL - Abnormal; Notable for the following components:    WBC 11.8 (*)     Seg Neutrophils 72 (*)     Lymphocytes 22 (*)     Eosinophils % 0 (*)     Segs Absolute 8.45 (*)     All other components within normal limits   BASIC METABOLIC PANEL - Abnormal; Notable for the following components:    Glucose 61 (*)     CO2 19 (*)     All other components within normal limits   HCG, QUANTITATIVE, PREGNANCY - Abnormal; Notable for the following components:    hCG Quant 8 (*)     All other components within normal limits   TROPONIN   COVID-19           PLAN/ TASKS OUTSTANDING       Pt with chest pain and anxiety with positive pregnancy test. Plan for dc if labs unremarkable. Plan for OB followup.      (Please note that portions of this note were completed with a voice recognition program.  Efforts were made to edit the dictations but occasionally words are mis-transcribed.)    Deyvi Schwartz MD, MD,   Attending Emergency Physician       Deyvi Schwartz MD  10/06/21 2153

## 2021-10-06 NOTE — Clinical Note
Sary Zelaya was seen and treated in our emergency department on 10/6/2021. She may return to work on 10/07/2021. If you have any questions or concerns, please don't hesitate to call.       Lorenzo Bingham MD

## 2021-10-06 NOTE — TELEPHONE ENCOUNTER
Received call from Cooper at [facility] with [employer]. Brief description of triage: right side CP, sob and cough. Onset several days, pain increases with deep breath    Triage indicates for patient to pcp or ucc today    Care advice provided, patient verbalizes understanding; denies any other questions or concerns; instructed to call back for any new or worsening symptoms. Writer provided warm transfer to [facility] for appointment scheduling. Attention Provider: Thank you for allowing me to participate in the care of your patient. The patient was connected to triage in response to information provided to the ECC/PSC. Please do not respond through this encounter as the response is not directed to a shared pool. Reason for Disposition   Chest pain or 'angina' comes and goes and is happening more often (increasing in frequency) or getting worse (increasing in severity) (Exception: chest pains that last only a few seconds)    Answer Assessment - Initial Assessment Questions  1. LOCATION: \"Where does it hurt? \"        Right upper breast towards under arm    2. RADIATION: \"Does the pain go anywhere else? \" (e.g., into neck, jaw, arms, back)      Denies    3. ONSET: \"When did the chest pain begin? \" (Minutes, hours or days)       Couple days ago    4. PATTERN \"Does the pain come and go, or has it been constant since it started? \"  \"Does it get worse with exertion? \"       comes and goes gets worse when I think about my daughter, also feels stabbing with deep breath    5. DURATION: \"How long does it last\" (e.g., seconds, minutes, hours)      Lasts for a little while    6. SEVERITY: \"How bad is the pain? \"  (e.g., Scale 1-10; mild, moderate, or severe)     - MILD (1-3): doesn't interfere with normal activities      - MODERATE (4-7): interferes with normal activities or awakens from sleep     - SEVERE (8-10): excruciating pain, unable to do any normal activities        7/10    7.

## 2021-10-06 NOTE — ED PROVIDER NOTES
[facility]     Emergency Department     Faculty Attestation    I performed a history and physical examination of the patient and discussed management with the resident. I have reviewed and agree with the residents findings including all diagnostic interpretations, and treatment plans as written. Any areas of disagreement are noted on the chart. I was personally present for the key portions of any procedures. I have documented in the chart those procedures where I was not present during the key portions. I have reviewed the emergency nurses triage note. I agree with the chief complaint, past medical history, past surgical history, allergies, medications, social and family history as documented unless otherwise noted below. Documentation of the HPI, Physical Exam and Medical Decision Making performed by tessa is based on my personal performance of the HPI, PE and MDM. For Physician Assistant/ Nurse Practitioner cases/documentation I have personally evaluated this patient and have completed at least one if not all key elements of the E/M (history, physical exam, and MDM). Additional findings are as noted. Patient with recent infant death. Had episode yesterday where she got very anxious in was involved in an argument with her significant other as well as her mother. Started crying and hyperventilating. 911 was called. And EMS was dispatched. Patient discussed with them it started feeling better. Today she had another episode where she was talking to her mother on the phone and started crying and feeling some discomfort to the right side of her chest.  And again some shortness of breath. She then came into the ER for evaluation due to concern for cardiac etiology. As her mom also thought maybe it was something heart related. Patient also states with the pain she felt some numbness in her hands. She has a history of a seizure disorder.   And states it felt like she was going to have a seizure. Her symptoms are resolved at this time. She is 3 months postpartum. She had a last menstrual cycle September 12. Which was her first menstrual cycle since her delivery. No abdominal pain no nausea or vomiting. Patient on exam is well-appearing speaking in full sentences. She occasionally gets teary-eyed. No leg swelling no calf tenderness to palpation bilaterally. Abdomen soft nondistended nontender to palpation all quadrants no rebound or guarding noted. Patient did have EKG, low concern for any cardiac etiology. She is nontachycardic. No calf tenderness palpation. Low concern for PE. Her hCG did test positive we will plan on ultrasound outpatient. And OB follow-up. We will get quant. She is perc negative    EKG Interpretation    Interpreted by me    KG shows a normal sinus rhythm with normal axis there is sinus arrhythmia. Ventricular rate of 75 VT interval of 122 and a QRS of 76 QT corrected of 433. No ST elevations or depressions noted no T wave inversions.         Goran Marie D.O, M.P.H  Attending Emergency Medicine Physician         Goran Marie DO  10/06/21 1070
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
## 2021-10-07 LAB
SARS-COV-2: NORMAL
SARS-COV-2: NOT DETECTED
SOURCE: NORMAL

## 2021-10-15 ENCOUNTER — APPOINTMENT (OUTPATIENT)
Dept: CT IMAGING | Age: 26
End: 2021-10-15
Payer: MEDICARE

## 2021-10-15 ENCOUNTER — HOSPITAL ENCOUNTER (EMERGENCY)
Age: 26
Discharge: HOME | End: 2021-10-15
Attending: EMERGENCY MEDICINE
Payer: MEDICARE

## 2021-10-15 VITALS
SYSTOLIC BLOOD PRESSURE: 125 MMHG | HEART RATE: 80 BPM | OXYGEN SATURATION: 100 % | TEMPERATURE: 98 F | RESPIRATION RATE: 18 BRPM | DIASTOLIC BLOOD PRESSURE: 90 MMHG
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
## 2021-10-15 PROCEDURE — 99283 EMERGENCY DEPT VISIT LOW MDM: CPT

## 2021-10-15 PROCEDURE — 70450 CT HEAD/BRAIN W/O DYE: CPT

## 2021-10-15 ASSESSMENT — ENCOUNTER SYMPTOMS
BACK PAIN: 0
ABDOMINAL PAIN: 0
SHORTNESS OF BREATH: 0
COUGH: 0

## 2021-10-15 ASSESSMENT — PAIN DESCRIPTION - PAIN TYPE: TYPE: ACUTE PAIN

## 2021-10-15 ASSESSMENT — PAIN SCALES - GENERAL: PAINLEVEL_OUTOF10: 8

## 2021-10-15 NOTE — ED NOTES
Bed: 29  Expected date:   Expected time:   Means of arrival:   Comments:     Dawn Dubois RN  10/15/21 [id number]
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
## 2021-10-15 NOTE — ED PROVIDER NOTES
patient does have a history of tumor removal and metal plate placed in her brain. We will plan on CT imaging rule out orbital fracture, pain control ice. Last tetanus was updated in 2017.     Nisha Trejo D.O, M.P.H  Attending Emergency Medicine Physician         Nisha Trejo,   10/15/21 0111

## 2021-10-15 NOTE — ED NOTES
Pt given ice pack for eye and warm blanket. Awaiting additional orders.       Ankita Easton RN  10/15/21 0857

## 2021-10-15 NOTE — ED NOTES
Pt pending discharge home at this time. Pt sleeping in bed, arousal to give verbal discharge instructions.       Chidi Auguste RN  10/15/21 0628
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
## 2021-11-24 ENCOUNTER — TELEPHONE (OUTPATIENT)
Dept: FAMILY MEDICINE CLINIC | Age: 26
End: 2021-11-24

## 2021-11-24 DIAGNOSIS — G40.909 SEIZURE DISORDER (HCC): ICD-10-CM

## 2021-11-24 RX ORDER — LEVETIRACETAM 750 MG/1
1500 TABLET ORAL 2 TIMES DAILY
OUTPATIENT
Start: 2021-11-24 | End: 2022-02-22

## 2021-11-24 NOTE — TELEPHONE ENCOUNTER
Call [phone], patient is currently out of medication would like to know if can get refill until appointment. Please advise, thank you. She has been going through personal things she stated. Please address the medication refill and close the encounter. If I can be of assistance, please route to the applicable pool. Thank you.       Last visit: 7-2-21  Last Med refill: 3-5-2021  Does patient have enough medication for 72 hours: No:     Next Visit Date:  Future Appointments   Date Time Provider Travis Sadler   12/9/2021  2:15 PM Rico Segura MD [facility] [place]   12/30/2021  3:20 PM Giovani Chun MD Neuro [facility] [address] Maintenance   Topic Date Due    Hepatitis C screen  Never done    Varicella vaccine (1 of 2 - 2-dose childhood series) Never done    COVID-19 Vaccine (1) Never done    Pneumococcal 0-64 years Vaccine (1 of 2 - PPSV23) Never done    HPV vaccine (1 - 2-dose series) Never done    Pap smear  Never done    Flu vaccine (1) Never done    DTaP/Tdap/Td vaccine (2 - Td or Tdap) 08/17/2027    HIV screen  Completed    Hepatitis A vaccine  Aged Out    Hepatitis B vaccine  Aged Out    Hib vaccine  Aged Out    Meningococcal (ACWY) vaccine  Aged Out       No results found for: LABA1C          ( goal A1C is < 7)   No results found for: LABMICR  No results found for: LDLCHOLESTEROL, LDLCALC    (goal LDL is <100)   AST (U/L)   Date Value   11/10/2013 21     ALT (U/L)   Date Value   11/10/2013 25     BUN (mg/dL)   Date Value   10/06/2021 9     BP Readings from Last 3 Encounters:   10/15/21 (!) 125/90   10/06/21 138/84   09/02/21 128/80          (goal 120/80)    All Future Testing planned in [facility]  Lab Frequency Next Occurrence               Patient Active Problem List:     Encounter to establish care     Seizure disorder ([facility] [address]     Trying to get pregnant     Otalgia     Acute suppurative otitis media of left ear without spontaneous rupture of tympanic membrane Breakthrough seizure ([facility] [address]     Bacterial vaginosis     Urinary tract infection without hematuria     Seizure ([facility] [address]     Localization-related epilepsy ([facility] [address]     History of brain surgery     Asthma     Gastroesophageal reflux disease     Focal epilepsy ([facility] [address]

## 2021-12-15 ENCOUNTER — TELEMEDICINE (OUTPATIENT)
Dept: FAMILY MEDICINE CLINIC | Age: 26
End: 2021-12-15
Payer: MEDICARE

## 2021-12-15 DIAGNOSIS — G40.909 SEIZURE DISORDER (HCC): ICD-10-CM

## 2021-12-15 PROCEDURE — 99442 PR PHYS/QHP TELEPHONE EVALUATION 11-20 MIN: CPT | Performed by: STUDENT IN AN ORGANIZED HEALTH CARE EDUCATION/TRAINING PROGRAM

## 2021-12-15 RX ORDER — LEVETIRACETAM 750 MG/1
1500 TABLET ORAL 2 TIMES DAILY
Qty: 360 TABLET | Refills: 0 | Status: SHIPPED | OUTPATIENT
Start: 2021-12-15 | End: 2022-01-20 | Stop reason: SDUPTHER

## 2021-12-15 NOTE — LETTER
[facility]  [address]  [address] [address]  Phone: [phone]  Fax: [phone]    Lita Francis MD        December 15, 2021     Patient: Leti Oleary   YOB: 1995   Date of Visit: 12/15/2021       To Whom It May Concern: It is my medical opinion that Walt Valdez may return to full duty immediately. No Driving, no heavy machinery operation, no heavy lifting. If you have any questions or concerns, please don't hesitate to call.     Sincerely,        Lita Francis MD

## 2021-12-15 NOTE — PROGRESS NOTES
[address] [facility] Medicine Residency Program - Virtual Visit        Naveen Muñoz is a 32 y.o. female evaluated via telephone on 12/15/2021. Consent:  She and/or health care decision maker is aware that that she may receive a bill for this telephone service, depending on her insurance coverage, and has provided verbal consent to proceed: Yes      Documentation:  I communicated with the patient and/or health care decision maker about patient had seizure approx 1.5 weeks ago. She may have missed a dose of her keppra. Would like to return to work. 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 She would like referral to new neurologist.   Details of this discussion including any medical advice provided:     ASSESSMENT/PLAN:    1. Seizure disorder ([facility] [address]  - Refill keppra, establish with new neurologist.     - levETIRAcetam (KEPPRA) 750 MG tablet; Take 2 tablets by mouth 2 times daily  Dispense: 360 tablet; Refill: 0  - [facility] - John Knowles MD, Neurology, [facility] Prescriptions     Signed Prescriptions Disp Refills    levETIRAcetam (KEPPRA) 750 MG tablet 360 tablet 0     Sig: Take 2 tablets by mouth 2 times daily       Medications Discontinued During This Encounter   Medication Reason    levETIRAcetam (KEPPRA) 750 MG tablet REORDER       Return in about 4 weeks (around 1/12/2022) for Seizure. I affirm this is a Patient Initiated Episode with a Patient who has not had a related appointment within my department in the past 7 days or scheduled within the next 24 hours. Patient identification was verified at the start of the visit: Yes    Total Time: minutes: 11-20 minutes    Note: not billable if this call serves to triage the patient into an appointment for the relevant concern      Nik Solis.  Jhoan Villalobos MD  Family Medicine PGY-3  12/15/21 at 4:10 PM

## 2021-12-15 NOTE — PROGRESS NOTES
Attending Physician Statement  I  have discussed the care of Joyce Panchal including pertinent history and exam findings with the resident. I agree with the assessment, plan and orders as documented by the resident. There were no vitals taken for this visit. BP Readings from Last 3 Encounters:   10/15/21 (!) 125/90   10/06/21 138/84   09/02/21 128/80     Wt Readings from Last 3 Encounters:   09/02/21 130 lb (59 kg)   03/05/21 148 lb (67.1 kg)   11/19/20 145 lb 6.4 oz (66 kg)          Diagnosis Orders   1.  Seizure disorder ([facility] [address]  levETIRAcetam (KEPPRA) 750 MG tablet    [facility] - Levon Deleon MD, Neurology, [place], [place] 12/15/2021 4:43 PM

## 2021-12-15 NOTE — PROGRESS NOTES
Visit Information    Have you changed or started any medications since your last visit including any over-the-counter medicines, vitamins, or herbal medicines? no   Are you having any side effects from any of your medications? -  no  Have you stopped taking any of your medications? Is so, why? -  no    Have you seen any other physician or provider since your last visit? No  Have you had any other diagnostic tests since your last visit? No  Have you been seen in the emergency room and/or had an admission to a hospital since we last saw you? No  Have you had your routine dental cleaning in the past 6 months? no    Have you activated your [employer] account? If not, what are your barriers?  Yes     Patient Care Team:  Georgiana Rubio MD as PCP - General (Family Medicine)    Medical History Review  Past Medical, Family, and Social History reviewed and does not contribute to the patient presenting condition    Health Maintenance   Topic Date Due    Hepatitis C screen  Never done    Varicella vaccine (1 of 2 - 2-dose childhood series) Never done    Pneumococcal 0-64 years Vaccine (1 of 2 - PPSV23) Never done    HPV vaccine (1 - 2-dose series) Never done    COVID-19 Vaccine (1) Never done    Pap smear  Never done    Flu vaccine (1) Never done    DTaP/Tdap/Td vaccine (2 - Td or Tdap) 08/17/2027    HIV screen  Completed    Hepatitis A vaccine  Aged Out    Hepatitis B vaccine  Aged Out    Hib vaccine  Aged Out    Meningococcal (ACWY) vaccine  Aged Out

## 2021-12-21 ENCOUNTER — TELEPHONE (OUTPATIENT)
Dept: FAMILY MEDICINE CLINIC | Age: 26
End: 2021-12-21

## 2021-12-21 NOTE — TELEPHONE ENCOUNTER
Patient had called in regarding her vaccine record, printed, placed up front, patient will come in today to .

## 2022-01-20 ENCOUNTER — HOSPITAL ENCOUNTER (OUTPATIENT)
Age: 27
Setting detail: SPECIMEN
Discharge: HOME | End: 2022-01-20

## 2022-01-20 ENCOUNTER — OFFICE VISIT (OUTPATIENT)
Dept: FAMILY MEDICINE CLINIC | Age: 27
End: 2022-01-20
Payer: MEDICARE

## 2022-01-20 VITALS
SYSTOLIC BLOOD PRESSURE: 120 MMHG | DIASTOLIC BLOOD PRESSURE: 82 MMHG | BODY MASS INDEX: 25.61 KG/M2 | WEIGHT: 140 LBS | HEART RATE: 78 BPM

## 2022-01-20 DIAGNOSIS — G40.909 SEIZURE DISORDER (HCC): ICD-10-CM

## 2022-01-20 DIAGNOSIS — N30.00 ACUTE CYSTITIS WITHOUT HEMATURIA: Primary | ICD-10-CM

## 2022-01-20 DIAGNOSIS — N30.00 ACUTE CYSTITIS WITHOUT HEMATURIA: ICD-10-CM

## 2022-01-20 DIAGNOSIS — F32.A DEPRESSION, UNSPECIFIED DEPRESSION TYPE: ICD-10-CM

## 2022-01-20 LAB
BILIRUBIN, POC: NEGATIVE
BLOOD URINE, POC: ABNORMAL
CLARITY, POC: ABNORMAL
COLOR, POC: YELLOW
CONTROL: NORMAL
GLUCOSE URINE, POC: NEGATIVE
KEPPRA: 27 UG/ML
KETONES, POC: ABNORMAL
LEUKOCYTE EST, POC: ABNORMAL
NITRITE, POC: NEGATIVE
PH, POC: 8.5
PREGNANCY TEST URINE, POC: NEGATIVE
PROTEIN, POC: ABNORMAL
SPECIFIC GRAVITY, POC: 1.02
UROBILINOGEN, POC: 0.2

## 2022-01-20 PROCEDURE — 4004F PT TOBACCO SCREEN RCVD TLK: CPT | Performed by: STUDENT IN AN ORGANIZED HEALTH CARE EDUCATION/TRAINING PROGRAM

## 2022-01-20 PROCEDURE — G8484 FLU IMMUNIZE NO ADMIN: HCPCS | Performed by: STUDENT IN AN ORGANIZED HEALTH CARE EDUCATION/TRAINING PROGRAM

## 2022-01-20 PROCEDURE — 99213 OFFICE O/P EST LOW 20 MIN: CPT | Performed by: STUDENT IN AN ORGANIZED HEALTH CARE EDUCATION/TRAINING PROGRAM

## 2022-01-20 PROCEDURE — 81003 URINALYSIS AUTO W/O SCOPE: CPT | Performed by: STUDENT IN AN ORGANIZED HEALTH CARE EDUCATION/TRAINING PROGRAM

## 2022-01-20 PROCEDURE — G8419 CALC BMI OUT NRM PARAM NOF/U: HCPCS | Performed by: STUDENT IN AN ORGANIZED HEALTH CARE EDUCATION/TRAINING PROGRAM

## 2022-01-20 PROCEDURE — 81025 URINE PREGNANCY TEST: CPT | Performed by: STUDENT IN AN ORGANIZED HEALTH CARE EDUCATION/TRAINING PROGRAM

## 2022-01-20 PROCEDURE — G8427 DOCREV CUR MEDS BY ELIG CLIN: HCPCS | Performed by: STUDENT IN AN ORGANIZED HEALTH CARE EDUCATION/TRAINING PROGRAM

## 2022-01-20 RX ORDER — SULFAMETHOXAZOLE AND TRIMETHOPRIM 800; 160 MG/1; MG/1
1 TABLET ORAL 2 TIMES DAILY
Qty: 6 TABLET | Refills: 0 | Status: SHIPPED | OUTPATIENT
Start: 2022-01-20 | End: 2022-01-23

## 2022-01-20 RX ORDER — LEVETIRACETAM 750 MG/1
1500 TABLET ORAL 2 TIMES DAILY
Qty: 360 TABLET | Refills: 0 | Status: SHIPPED | OUTPATIENT
Start: 2022-01-20 | End: 2022-02-25 | Stop reason: SDUPTHER

## 2022-01-20 SDOH — ECONOMIC STABILITY: FOOD INSECURITY: WITHIN THE PAST 12 MONTHS, THE FOOD YOU BOUGHT JUST DIDN'T LAST AND YOU DIDN'T HAVE MONEY TO GET MORE.: NEVER TRUE

## 2022-01-20 SDOH — ECONOMIC STABILITY: FOOD INSECURITY: WITHIN THE PAST 12 MONTHS, YOU WORRIED THAT YOUR FOOD WOULD RUN OUT BEFORE YOU GOT MONEY TO BUY MORE.: NEVER TRUE

## 2022-01-20 ASSESSMENT — SOCIAL DETERMINANTS OF HEALTH (SDOH): HOW HARD IS IT FOR YOU TO PAY FOR THE VERY BASICS LIKE FOOD, HOUSING, MEDICAL CARE, AND HEATING?: NOT VERY HARD

## 2022-01-20 ASSESSMENT — ENCOUNTER SYMPTOMS
VOMITING: 0
DIARRHEA: 0
CONSTIPATION: 0
NAUSEA: 0
ABDOMINAL PAIN: 0
WHEEZING: 0
COUGH: 0
SHORTNESS OF BREATH: 0

## 2022-01-20 NOTE — PROGRESS NOTES
[address] [facility] Residency Program - Outpatient Note        Kashif Case is a 32 y.o. female  presented to the office on 01/20/22:      Patient complaining of frequency and burning with urination. Concerned she has UTI. We will do urinalysis    History of epilepsy, patient states she has staring spells, most recent one was earlier today. Patient does not drive, she is on Keppra 1500 mg twice daily. Will check Keppra level. Referral was previously provided for neurology. Patient has a history of depression, loss of child in the past year. He is on Zoloft at 25 mg, patient reports mild improvement. We discussed increasing 50 mg    Pregnancy test was negative, explained to patient that Zoloft Keppra Bactrim can be harmful to the fetus, patient states that there is no chance she is pregnant understands that she will inform us if she plans on becoming or finds out that she is pregnant. Diagnosis Orders   1. Acute cystitis without hematuria  POCT Urinalysis No Micro (Auto)    Culture, Urine    POCT urine pregnancy   2. Depression, unspecified depression type  sertraline (ZOLOFT) 50 MG tablet   3. Seizure disorder (HCC)  levETIRAcetam (KEPPRA) 750 MG tablet    Levetiracetam Level    POCT urine pregnancy       Plan:  1.)  Urinalysis positive leukocyte esterase, Bactrim for 3 days  2.)  Increase Zoloft 50 mg  3.)  Continue Keppra 1500 mg twice daily, check Keppra level  4.)  Patient appears to make appointment with neurology, says she will call them today           Review of Systems   Constitutional: Negative for chills, diaphoresis and fever. Respiratory: Negative for cough, shortness of breath and wheezing. Cardiovascular: Negative for chest pain, palpitations and leg swelling. Gastrointestinal: Negative for abdominal pain, constipation, diarrhea, nausea and vomiting. Genitourinary: Positive for dysuria and frequency. Negative for difficulty urinating. Neurological: Positive for seizures. Negative for dizziness, light-headedness and headaches. Psychiatric/Behavioral: Positive for dysphoric mood. There were no vitals filed for this visit. Physical Exam  Vitals reviewed. Constitutional:       General: She is not in acute distress. Eyes:      Extraocular Movements: Extraocular movements intact. Conjunctiva/sclera: Conjunctivae normal.   Cardiovascular:      Rate and Rhythm: Normal rate and regular rhythm. Pulses: Normal pulses. Heart sounds: Normal heart sounds. Pulmonary:      Effort: Pulmonary effort is normal.      Breath sounds: Normal breath sounds. Abdominal:      General: Bowel sounds are normal. There is no distension. Palpations: Abdomen is soft. Tenderness: There is no abdominal tenderness. There is no guarding. Neurological:      General: No focal deficit present. Mental Status: She is alert. Psychiatric:         Behavior: Behavior normal.           Return in about 4 weeks (around 2/17/2022) for Depression, seizure. Melissa Wilde MD  Family Medicine PGY-3  01/20/22 at 2:53 PM

## 2022-01-21 LAB
CULTURE: NO GROWTH
Lab: NORMAL
SPECIMEN DESCRIPTION: NORMAL

## 2022-01-24 NOTE — PROGRESS NOTES
Attending Physician Statement    Wt Readings from Last 3 Encounters:   01/20/22 140 lb (63.5 kg)   09/02/21 130 lb (59 kg)   03/05/21 148 lb (67.1 kg)     Temp Readings from Last 3 Encounters:   10/15/21 98 °F (36.7 °C)   10/06/21 97.3 °F (36.3 °C)   09/02/21 98.2 °F (36.8 °C) (Oral)     BP Readings from Last 3 Encounters:   01/20/22 120/82   10/15/21 (!) 125/90   10/06/21 138/84     Pulse Readings from Last 3 Encounters:   01/20/22 78   10/15/21 80   10/06/21 90         I have discussed the care of Lexi Flies, including pertinent history and exam findings with the resident. I have reviewed the key elements of all parts of the encounter with the resident. I agree with the assessment, plan and orders as documented by the resident.   (GE Modifier)

## 2022-02-01 ENCOUNTER — TELEPHONE (OUTPATIENT)
Dept: FAMILY MEDICINE CLINIC | Age: 27
End: 2022-02-01

## 2022-02-02 ENCOUNTER — VIRTUAL VISIT (OUTPATIENT)
Dept: FAMILY MEDICINE CLINIC | Age: 27
End: 2022-02-02
Payer: MEDICARE

## 2022-02-02 DIAGNOSIS — J45.909 UNCOMPLICATED ASTHMA, UNSPECIFIED ASTHMA SEVERITY, UNSPECIFIED WHETHER PERSISTENT: ICD-10-CM

## 2022-02-02 DIAGNOSIS — G40.909 SEIZURE DISORDER (HCC): Primary | ICD-10-CM

## 2022-02-02 PROCEDURE — 99423 OL DIG E/M SVC 21+ MIN: CPT | Performed by: STUDENT IN AN ORGANIZED HEALTH CARE EDUCATION/TRAINING PROGRAM

## 2022-02-02 RX ORDER — DIPHENHYDRAMINE HCL 25 MG
25 CAPSULE ORAL EVERY 4 HOURS PRN
Qty: 1 CAPSULE | Refills: 0 | Status: CANCELLED | OUTPATIENT
Start: 2022-02-02

## 2022-02-02 RX ORDER — ALBUTEROL SULFATE 90 UG/1
2 AEROSOL, METERED RESPIRATORY (INHALATION) EVERY 6 HOURS PRN
Qty: 1 EACH | Refills: 3 | Status: SHIPPED | OUTPATIENT
Start: 2022-02-02 | End: 2022-02-25 | Stop reason: SDUPTHER

## 2022-02-02 RX ORDER — DIPHENHYDRAMINE HCL 25 MG
25 CAPSULE ORAL NIGHTLY PRN
Qty: 30 CAPSULE | Refills: 0 | Status: SHIPPED | OUTPATIENT
Start: 2022-02-02 | End: 2022-02-25 | Stop reason: SDUPTHER

## 2022-02-02 RX ORDER — FOLIC ACID 1 MG/1
1 TABLET ORAL DAILY
Qty: 90 TABLET | Refills: 1 | Status: SHIPPED | OUTPATIENT
Start: 2022-02-02 | End: 2022-02-25 | Stop reason: SDUPTHER

## 2022-02-02 NOTE — PROGRESS NOTES
Visit Information    Have you changed or started any medications since your last visit including any over-the-counter medicines, vitamins, or herbal medicines? no   Have you stopped taking any of your medications? Is so, why? -  no  Are you having any side effects from any of your medications? - no    Have you seen any other physician or provider since your last visit?  no   Have you had any other diagnostic tests since your last visit? yes - Labs,    Have you been seen in the emergency room and/or had an admission in a hospital since we last saw you?  no   Have you had your routine dental cleaning in the past 6 months?  no     Do you have an active MyChart account? If no, what is the barrier?   Yes    Patient Care Team:  Kira Denis MD as PCP - General (Family Medicine)    Medical History Review  Past Medical, Family, and Social History reviewed and does not contribute to the patient presenting condition    Health Maintenance   Topic Date Due    Hepatitis C screen  Never done    Varicella vaccine (1 of 2 - 2-dose childhood series) Never done    COVID-19 Vaccine (1) Never done    Pneumococcal 0-64 years Vaccine (1 of 2 - PPSV23) Never done    HPV vaccine (1 - 2-dose series) Never done    Pap smear  Never done    Flu vaccine (1) Never done    Depression Monitoring  03/05/2022    DTaP/Tdap/Td vaccine (2 - Td or Tdap) 08/17/2027    HIV screen  Completed    Hepatitis A vaccine  Aged Out    Hepatitis B vaccine  Aged Out    Hib vaccine  Aged Out    Meningococcal (ACWY) vaccine  Aged Out

## 2022-02-02 NOTE — PATIENT INSTRUCTIONS
Thank you for letting us take care of you today. We hope all your questions were addressed. If a question was overlooked or something else comes to mind after you return home, please contact a member of your Care Team listed below. Your Care Team at [place] [address] is Team #5  Suann Phoenix, MD (Faculty)  Archana Camarillo MD (Resident)  Tres Deleon MD (Resident)  Ana Kemp MD (Resident)  Roderick Mcdermott MD (Resident)  Jules Macias., JASON Hernandez., LISA Villagomez., Eligio Guevara., Dima HernandezCarson [facility] office)  Destinee Maravilla, [address] (Clinical Practice Manager)  Arlene Finn [facility] (Clinical Pharmacist)       Office phone number: [phone]    If you need to get in right away due to illness, please be advised we have \"Same Day\" appointments available Monday-Friday. Please call us at [phone] option #3 to schedule your \"Same Day\" appointment.

## 2022-02-02 NOTE — TELEPHONE ENCOUNTER
I can't clear patient to go back to work if she is having seizures, she needs to see her neurologist ASAP. She can do a same day visit to see someone this afternoon to discuss this, it is a safety issue.

## 2022-02-02 NOTE — PROGRESS NOTES
Attending Physician Statement  I have discussed the care of Oswaldo Thornton 32 y.o. female, including pertinent history and exam findings, with the resident Dr. Clint Marti MD.    History and Exam:   Chief Complaint   Patient presents with    Seizures     Last one 01/31/22 - Patient was sitting at time of seizure    Orders     Asking for DME for a BP cuff    Medication Refill     [facility] on [address]       Past Medical History:   Diagnosis Date    Anxiety     Asthma 7/12/2019    Seizures ([facility] [address]      No Known Allergies   I have seen and examined the patient and the key elements of the encounter have been performed by me. BP Readings from Last 3 Encounters:   01/20/22 120/82   10/15/21 (!) 125/90   10/06/21 138/84     There were no vitals taken for this visit. Lab Results   Component Value Date    WBC 11.8 (H) 10/06/2021    HGB 14.1 10/06/2021    HCT 42.8 10/06/2021     10/06/2021    ALT 25 11/10/2013    AST 21 11/10/2013     10/06/2021    K 3.8 10/06/2021     10/06/2021    CREATININE 0.87 10/06/2021    BUN 9 10/06/2021    CO2 19 (L) 10/06/2021     Lab Results   Component Value Date    LABALBU 3.9 11/10/2013     No results found for: IRON, TIBC, FERRITIN  No results found for: LDLCALC, LDLCHOLESTEROL, LDLDIRECT  I agree with the assessment, plan and the diagnosis of    Diagnosis Orders   1. Seizure disorder ([facility] [address]     2. Uncomplicated asthma, unspecified asthma severity, unspecified whether persistent  albuterol sulfate HFA (VENTOLIN HFA) 108 (90 Base) MCG/ACT inhaler    . I agree with orders as documented by the resident. More than 25 minutes spent  in face to face encounter with the patient and more than half in counseling. Patient's questions were answered. Patient Voiced understanding to the counseling. Return if symptoms worsen or fail to improve.    (GC Modifier)-Dr. Kendrick James MD

## 2022-02-02 NOTE — PROGRESS NOTES
Summer Wynne is a 32 y.o. female evaluated via telephone on 2/2/2022. Consent:  She and/or health care decision maker is aware that that she may receive a bill for this telephone service, which includes applicable co-pays, depending on her insurance coverage, and has provided verbal consent to proceed. Documentation:  I communicated with the patient and/or health care decision maker about seizure. Details of this discussion including any medical advice provided:     32year old female scheduled for phone visit to discuss her seizure. Stated she had a seizure on 1/31/22. Would like a return to work note. Patient stated she has had a seizure history for approximately 7-8 years now. Stated she has been on keppra 1.5g BID. Has been following with neurologist, stated last seen them in December. Stated she does have an episode of seizure every once in a while. Patient stated it is usual for her. Patient works at  and would like a return to work note. Patient has a follow up appointment with neurology later this month. At this time, patient may return to work. No other acute concerns today. I affirm this is a Patient Initiated Episode with a Patient who has not had a related appointment within my department in the past 7 days or scheduled within the next 24 hours. Patient identification was verified at the start of the visit: Yes    Total Time: minutes: 21-30 minutes    Summre Wynne was evaluated through a synchronous (real-time) audio encounter. The patient was located at home in a state where the provider was licensed to provide care.     Note: not billable if this call serves to triage the patient into an appointment for the relevant concern      Ashutosh Martinez MD

## 2022-02-02 NOTE — LETTER
[address]  [address]  [place] [address]  Phone: [phone]  Fax: [phone]    India Colon MD        February 2, 2022     Patient: Jorge Solis   YOB: 1995   Date of Visit: 2/2/2022       To Whom It May Concern: It is my medical opinion that Mau Ram may return to work on 2/3/2022. If you have any questions or concerns, please don't hesitate to call.     Sincerely,      Electronically signed by India Colon MD on 2/2/2022 at 3:16 PM

## 2022-02-25 ENCOUNTER — TELEMEDICINE (OUTPATIENT)
Dept: FAMILY MEDICINE CLINIC | Age: 27
End: 2022-02-25
Payer: MEDICARE

## 2022-02-25 DIAGNOSIS — J45.909 UNCOMPLICATED ASTHMA, UNSPECIFIED ASTHMA SEVERITY, UNSPECIFIED WHETHER PERSISTENT: ICD-10-CM

## 2022-02-25 DIAGNOSIS — G40.909 SEIZURE DISORDER (HCC): Primary | ICD-10-CM

## 2022-02-25 DIAGNOSIS — F32.A DEPRESSION, UNSPECIFIED DEPRESSION TYPE: ICD-10-CM

## 2022-02-25 PROCEDURE — 99442 PR PHYS/QHP TELEPHONE EVALUATION 11-20 MIN: CPT | Performed by: STUDENT IN AN ORGANIZED HEALTH CARE EDUCATION/TRAINING PROGRAM

## 2022-02-25 RX ORDER — METRONIDAZOLE 500 MG/1
TABLET ORAL
COMMUNITY
Start: 2021-12-23 | End: 2022-07-23

## 2022-02-25 RX ORDER — DIPHENHYDRAMINE HCL 25 MG
25 CAPSULE ORAL NIGHTLY PRN
Qty: 30 CAPSULE | Refills: 3 | Status: SHIPPED | OUTPATIENT
Start: 2022-02-25 | End: 2022-03-27

## 2022-02-25 RX ORDER — FLUCONAZOLE 150 MG/1
TABLET ORAL
COMMUNITY
Start: 2021-12-23

## 2022-02-25 RX ORDER — ALBUTEROL SULFATE 90 UG/1
2 AEROSOL, METERED RESPIRATORY (INHALATION) EVERY 6 HOURS PRN
Qty: 1 EACH | Refills: 3 | Status: SHIPPED | OUTPATIENT
Start: 2022-02-25

## 2022-02-25 RX ORDER — LEVETIRACETAM 750 MG/1
1500 TABLET ORAL 2 TIMES DAILY
Qty: 360 TABLET | Refills: 3 | Status: SHIPPED | OUTPATIENT
Start: 2022-02-25 | End: 2022-05-26

## 2022-02-25 RX ORDER — NORGESTIMATE AND ETHINYL ESTRADIOL 0.25-0.035
KIT ORAL
COMMUNITY
Start: 2021-12-22

## 2022-02-25 RX ORDER — PHENYTOIN SODIUM 100 MG/1
100 CAPSULE, EXTENDED RELEASE ORAL 3 TIMES DAILY
Qty: 90 CAPSULE | Refills: 3 | Status: SHIPPED | OUTPATIENT
Start: 2022-02-25

## 2022-02-25 RX ORDER — FOLIC ACID 1 MG/1
1 TABLET ORAL DAILY
Qty: 90 TABLET | Refills: 1 | Status: SHIPPED | OUTPATIENT
Start: 2022-02-25

## 2022-02-25 RX ORDER — SERTRALINE HYDROCHLORIDE 100 MG/1
100 TABLET, FILM COATED ORAL DAILY
Qty: 30 TABLET | Refills: 1 | Status: SHIPPED | OUTPATIENT
Start: 2022-02-25 | End: 2022-10-12 | Stop reason: SDUPTHER

## 2022-02-25 ASSESSMENT — PATIENT HEALTH QUESTIONNAIRE - PHQ9
2. FEELING DOWN, DEPRESSED OR HOPELESS: 2
4. FEELING TIRED OR HAVING LITTLE ENERGY: 1
9. THOUGHTS THAT YOU WOULD BE BETTER OFF DEAD, OR OF HURTING YOURSELF: 0
8. MOVING OR SPEAKING SO SLOWLY THAT OTHER PEOPLE COULD HAVE NOTICED. OR THE OPPOSITE, BEING SO FIGETY OR RESTLESS THAT YOU HAVE BEEN MOVING AROUND A LOT MORE THAN USUAL: 0
10. IF YOU CHECKED OFF ANY PROBLEMS, HOW DIFFICULT HAVE THESE PROBLEMS MADE IT FOR YOU TO DO YOUR WORK, TAKE CARE OF THINGS AT HOME, OR GET ALONG WITH OTHER PEOPLE: 1
5. POOR APPETITE OR OVEREATING: 0
SUM OF ALL RESPONSES TO PHQ QUESTIONS 1-9: 9
6. FEELING BAD ABOUT YOURSELF - OR THAT YOU ARE A FAILURE OR HAVE LET YOURSELF OR YOUR FAMILY DOWN: 1
SUM OF ALL RESPONSES TO PHQ QUESTIONS 1-9: 9
SUM OF ALL RESPONSES TO PHQ9 QUESTIONS 1 & 2: 4
3. TROUBLE FALLING OR STAYING ASLEEP: 3
SUM OF ALL RESPONSES TO PHQ QUESTIONS 1-9: 9
7. TROUBLE CONCENTRATING ON THINGS, SUCH AS READING THE NEWSPAPER OR WATCHING TELEVISION: 0
1. LITTLE INTEREST OR PLEASURE IN DOING THINGS: 2
SUM OF ALL RESPONSES TO PHQ QUESTIONS 1-9: 9

## 2022-02-25 NOTE — PROGRESS NOTES
Visit Information    Have you changed or started any medications since your last visit including any over-the-counter medicines, vitamins, or herbal medicines? no   Have you stopped taking any of your medications? Is so, why? -  Yes see list   Are you having any side effects from any of your medications? - no    Have you seen any other physician or provider since your last visit?  no   Have you had any other diagnostic tests since your last visit?  no   Have you been seen in the emergency room and/or had an admission in a hospital since we last saw you?  no   Have you had your routine dental cleaning in the past 6 months?  no     Do you have an active MyChart account? If no, what is the barrier?   Yes    Patient Care Team:  Gudelia Arvizu MD as PCP - General (Family Medicine)    Medical History Review  Past Medical, Family, and Social History reviewed and does not contribute to the patient presenting condition    Health Maintenance   Topic Date Due    Hepatitis C screen  Never done    Varicella vaccine (1 of 2 - 2-dose childhood series) Never done    COVID-19 Vaccine (1) Never done    Pneumococcal 0-64 years Vaccine (1 of 2 - PPSV23) Never done    Pap smear  Never done    Flu vaccine (1) Never done    Depression Monitoring  03/05/2022    DTaP/Tdap/Td vaccine (2 - Td or Tdap) 08/17/2027    HIV screen  Completed    Hepatitis A vaccine  Aged Out    Hepatitis B vaccine  Aged Out    Hib vaccine  Aged Out    Meningococcal (ACWY) vaccine  Aged Out

## 2022-02-25 NOTE — PROGRESS NOTES
[address] Lis[address] [facility] Residency Program - Virtual Visit        Taylor Hdz is a 32 y.o. female evaluated via telephone on 2/25/2022. Consent:  She and/or health care decision maker is aware that that she may receive a bill for this telephone service, depending on her insurance coverage, and has provided verbal consent to proceed: Yes      Documentation:  I communicated with the patient and/or health care decision maker about Pt has depression, zoloft 50 not quite helping, denies SI. Patient ahs history of seizures, does not have neurologist though referral was provided. Patient has been having staring spells that she believes are seizures relatively frequently despite being on max dose of keppra. Details of this discussion including any medical advice provided:     ASSESSMENT/PLAN:    1. Seizure disorder ([address]  - Start dilantin 100mg TID, see neurology ASAP  - levETIRAcetam (KEPPRA) 750 MG tablet; Take 2 tablets by mouth 2 times daily  Dispense: 360 tablet; Refill: 3  - phenytoin (DILANTIN) 100 MG ER capsule; Take 1 capsule by mouth 3 times daily  Dispense: 90 capsule; Refill: 3    2. Depression, unspecified depression type  Increase zoloft to 100  - sertraline (ZOLOFT) 100 MG tablet; Take 1 tablet by mouth daily  Dispense: 30 tablet; Refill: 1    3. Uncomplicated asthma, unspecified asthma severity, unspecified whether persistent  - albuterol sulfate HFA (VENTOLIN HFA) 108 (90 Base) MCG/ACT inhaler; Inhale 2 puffs into the lungs every 6 hours as needed for Wheezing  Dispense: 1 each;  Refill: 3          Requested Prescriptions     Signed Prescriptions Disp Refills    albuterol sulfate HFA (VENTOLIN HFA) 108 (90 Base) MCG/ACT inhaler 1 each 3     Sig: Inhale 2 puffs into the lungs every 6 hours as needed for Wheezing    levETIRAcetam (KEPPRA) 750 MG tablet 360 tablet 3     Sig: Take 2 tablets by mouth 2 times daily    folic acid (FOLVITE) 1 MG tablet 90 tablet 1     Sig: Take 1 tablet by mouth daily    diphenhydrAMINE (BENADRYL ALLERGY) 25 MG capsule 30 capsule 3     Sig: Take 1 capsule by mouth nightly as needed for Itching or Allergies    sertraline (ZOLOFT) 100 MG tablet 30 tablet 1     Sig: Take 1 tablet by mouth daily    phenytoin (DILANTIN) 100 MG ER capsule 90 capsule 3     Sig: Take 1 capsule by mouth 3 times daily       Medications Discontinued During This Encounter   Medication Reason    sertraline (ZOLOFT) 50 MG tablet DOSE ADJUSTMENT    levETIRAcetam (KEPPRA) 750 MG tablet REORDER    albuterol sulfate HFA (VENTOLIN HFA) 108 (90 Base) MCG/ACT inhaler REORDER    folic acid (FOLVITE) 1 MG tablet REORDER    diphenhydrAMINE (BENADRYL ALLERGY) 25 MG capsule REORDER       Return in about 4 weeks (around 3/25/2022). I affirm this is a Patient Initiated Episode with a Patient who has not had a related appointment within my department in the past 7 days or scheduled within the next 24 hours. Patient identification was verified at the start of the visit: Yes    Total Time: minutes: 11-20 minutes    Note: not billable if this call serves to triage the patient into an appointment for the relevant concern      Eulalio Live.  Madelyn Rojas MD  Family Medicine PGY-3  02/25/22 at 4:34 PM

## 2022-02-25 NOTE — PROGRESS NOTES
Attending Physician Statement  I have discussed the care of Ron العراقي, including pertinent history and exam findings,  with the resident. I have reviewed the key elements of all parts of the encounter with the resident. I agree with the assessment, plan and orders as documented by the resident.   (Lucy Wilcox)    Carol Lindo MD

## 2022-02-25 NOTE — PATIENT INSTRUCTIONS
Thank you for letting us take care of you today. We hope all your questions were addressed. If a question was overlooked or something else comes to mind after you return home, please contact a member of your Care Team listed below. Your Care Team at [place] [address] is Team #2  Deshawn Milton DO (Faculty)  Jeovanny Harman (Faculty)  Russ Pugh MD (Resident)  Delma Sheikh MD (Resident)  Keila Faulkner MD (Resident)  Emily Faulkner MD (Resident)  Leno Gerard., JASON Guzman.,  LISA Lin., [facility] office)  Radha Bill, [address] [facility] [address] [facility] [address] [address] (Clinical Practice Manager)  Radha [facility] (Clinical Pharmacist)     Office phone number: [phone]    If you need to get in right away due to illness, please be advised we have \"Same Day\" appointments available Monday-Friday. Please call us at [phone] option #3 to schedule your \"Same Day\" appointment.

## 2022-02-28 ENCOUNTER — TELEPHONE (OUTPATIENT)
Dept: FAMILY MEDICINE CLINIC | Age: 27
End: 2022-02-28

## 2022-03-01 ENCOUNTER — TELEPHONE (OUTPATIENT)
Dept: FAMILY MEDICINE CLINIC | Age: 27
End: 2022-03-01

## 2022-03-01 DIAGNOSIS — G40.909 SEIZURE DISORDER (HCC): Primary | ICD-10-CM

## 2022-03-01 NOTE — TELEPHONE ENCOUNTER
I called and spoke with the patient over the phone. A referral had been placed to see Dr. Ivet Gómez for neurology, though referral needs to be faxed. I ordered Dilantin on Friday, patient picked it up today and will be starting it. Patient advised to go to the ED if she has another seizure, even a small one. Patient expressed understanding. amb

## 2022-03-01 NOTE — TELEPHONE ENCOUNTER
Patient called in looking for a referral for neurology for her seizure patient states that she had a phone visited on 2-25-22 and you was going to give her a referral to neuro? Patient also stating that she had a seizure last night and did not go to work this morning and now patient would like for you to writer her letter for today.

## 2022-04-12 ENCOUNTER — HOSPITAL ENCOUNTER (EMERGENCY)
Age: 27
Discharge: HOME | End: 2022-04-12
Attending: EMERGENCY MEDICINE
Payer: MEDICARE

## 2022-04-12 VITALS
OXYGEN SATURATION: 97 % | HEART RATE: 64 BPM | TEMPERATURE: 98.2 F | WEIGHT: 140 LBS | DIASTOLIC BLOOD PRESSURE: 95 MMHG | SYSTOLIC BLOOD PRESSURE: 122 MMHG | RESPIRATION RATE: 12 BRPM | BODY MASS INDEX: 25.61 KG/M2

## 2022-04-12 DIAGNOSIS — G40.919 BREAKTHROUGH SEIZURE (HCC): Primary | ICD-10-CM

## 2022-04-12 LAB
ABSOLUTE EOS #: 0.16 K/UL (ref 0–0.44)
ABSOLUTE IMMATURE GRANULOCYTE: <0.03 K/UL (ref 0–0.3)
ABSOLUTE LYMPH #: 3.44 K/UL (ref 1.1–3.7)
ABSOLUTE MONO #: 0.53 K/UL (ref 0.1–1.2)
ANION GAP SERPL CALCULATED.3IONS-SCNC: 10 MMOL/L (ref 9–17)
BASOPHILS # BLD: 1 % (ref 0–2)
BASOPHILS ABSOLUTE: 0.05 K/UL (ref 0–0.2)
BUN BLDV-MCNC: 8 MG/DL (ref 6–20)
CALCIUM SERPL-MCNC: 8.6 MG/DL (ref 8.6–10.4)
CHLORIDE BLD-SCNC: 106 MMOL/L (ref 98–107)
CO2: 24 MMOL/L (ref 20–31)
CREAT SERPL-MCNC: 0.86 MG/DL (ref 0.5–0.9)
EKG ATRIAL RATE: 76 BPM
EKG P AXIS: 68 DEGREES
EKG P-R INTERVAL: 142 MS
EKG Q-T INTERVAL: 382 MS
EKG QRS DURATION: 80 MS
EKG QTC CALCULATION (BAZETT): 429 MS
EKG R AXIS: 44 DEGREES
EKG T AXIS: 31 DEGREES
EKG VENTRICULAR RATE: 76 BPM
EOSINOPHILS RELATIVE PERCENT: 2 % (ref 1–4)
GFR AFRICAN AMERICAN: >60 ML/MIN
GFR NON-AFRICAN AMERICAN: >60 ML/MIN
GFR SERPL CREATININE-BSD FRML MDRD: NORMAL ML/MIN/{1.73_M2}
GLUCOSE BLD-MCNC: 88 MG/DL (ref 70–99)
HCG QUALITATIVE: NEGATIVE
HCT VFR BLD CALC: 38.5 % (ref 36.3–47.1)
HEMOGLOBIN: 13 G/DL (ref 11.9–15.1)
IMMATURE GRANULOCYTES: 0 %
KEPPRA: 68 UG/ML
LYMPHOCYTES # BLD: 51 % (ref 24–43)
MCH RBC QN AUTO: 32.2 PG (ref 25.2–33.5)
MCHC RBC AUTO-ENTMCNC: 33.8 G/DL (ref 28.4–34.8)
MCV RBC AUTO: 95.3 FL (ref 82.6–102.9)
MONOCYTES # BLD: 8 % (ref 3–12)
NRBC AUTOMATED: 0 PER 100 WBC
PDW BLD-RTO: 12.5 % (ref 11.8–14.4)
PHENYTOIN LEVEL: <0.8 UG/ML (ref 10–20)
PLATELET # BLD: 221 K/UL (ref 138–453)
PMV BLD AUTO: 9.9 FL (ref 8.1–13.5)
POTASSIUM SERPL-SCNC: 3.9 MMOL/L (ref 3.7–5.3)
RBC # BLD: 4.04 M/UL (ref 3.95–5.11)
SEG NEUTROPHILS: 38 % (ref 36–65)
SEGMENTED NEUTROPHILS ABSOLUTE COUNT: 2.6 K/UL (ref 1.5–8.1)
SODIUM BLD-SCNC: 140 MMOL/L (ref 135–144)
WBC # BLD: 6.8 K/UL (ref 3.5–11.3)

## 2022-04-12 PROCEDURE — 2580000003 HC RX 258: Performed by: STUDENT IN AN ORGANIZED HEALTH CARE EDUCATION/TRAINING PROGRAM

## 2022-04-12 PROCEDURE — 6360000002 HC RX W HCPCS: Performed by: STUDENT IN AN ORGANIZED HEALTH CARE EDUCATION/TRAINING PROGRAM

## 2022-04-12 PROCEDURE — 80185 ASSAY OF PHENYTOIN TOTAL: CPT

## 2022-04-12 PROCEDURE — 84703 CHORIONIC GONADOTROPIN ASSAY: CPT

## 2022-04-12 PROCEDURE — 80048 BASIC METABOLIC PNL TOTAL CA: CPT

## 2022-04-12 PROCEDURE — 85025 COMPLETE CBC W/AUTO DIFF WBC: CPT

## 2022-04-12 PROCEDURE — 96367 TX/PROPH/DG ADDL SEQ IV INF: CPT

## 2022-04-12 PROCEDURE — 99285 EMERGENCY DEPT VISIT HI MDM: CPT

## 2022-04-12 PROCEDURE — 93005 ELECTROCARDIOGRAM TRACING: CPT | Performed by: STUDENT IN AN ORGANIZED HEALTH CARE EDUCATION/TRAINING PROGRAM

## 2022-04-12 PROCEDURE — 96365 THER/PROPH/DIAG IV INF INIT: CPT

## 2022-04-12 PROCEDURE — 80177 DRUG SCRN QUAN LEVETIRACETAM: CPT

## 2022-04-12 PROCEDURE — 6370000000 HC RX 637 (ALT 250 FOR IP): Performed by: STUDENT IN AN ORGANIZED HEALTH CARE EDUCATION/TRAINING PROGRAM

## 2022-04-12 RX ORDER — LEVETIRACETAM 10 MG/ML
1000 INJECTION INTRAVASCULAR ONCE
Status: COMPLETED | OUTPATIENT
Start: 2022-04-12 | End: 2022-04-12

## 2022-04-12 RX ORDER — ACETAMINOPHEN 500 MG
1000 TABLET ORAL ONCE
Status: COMPLETED | OUTPATIENT
Start: 2022-04-12 | End: 2022-04-12

## 2022-04-12 RX ADMIN — DEXTROSE MONOHYDRATE 1000 MG PE: 50 INJECTION, SOLUTION INTRAVENOUS at 07:28

## 2022-04-12 RX ADMIN — LEVETIRACETAM 1000 MG: 10 INJECTION INTRAVENOUS at 05:50

## 2022-04-12 RX ADMIN — ACETAMINOPHEN 1000 MG: 500 TABLET ORAL at 05:50

## 2022-04-12 ASSESSMENT — ENCOUNTER SYMPTOMS
NAUSEA: 0
VOMITING: 0
BACK PAIN: 0
ABDOMINAL PAIN: 0
SHORTNESS OF BREATH: 0

## 2022-04-12 ASSESSMENT — PAIN SCALES - GENERAL: PAINLEVEL_OUTOF10: 4

## 2022-04-12 NOTE — ED PROVIDER NOTES
[facility] ED  Emergency Department Encounter  Emergency Medicine Resident     Pt Name: Maria L Mabry  MRN: [id number]  [place] 1995  Date of evaluation: 4/12/22  PCP:  Blair Harry MD    CHIEF COMPLAINT       Chief Complaint   Patient presents with    Seizures       HISTORY Luis  (Location/Symptom, Timing/Onset, Context/Setting, Quality, Duration, Modifying Factors,Severity.)      Maria L Mabry is a 32 y. o.yo female who presents with seizure. Patient here with seizures, states she had 2 seizures this morning, no witnesses for the seizures, states she came to realize she did not remember where she was and what was going on, had one episode of emesis after. States she gets seizures sometimes when she is about to go on her period. Does have seizure disorder history is currently on Keppra and Dilantin for seizure prophylaxis. Denies recent fevers or chills or traumatic injuries recent falls. States she is currently has a mild headache that is 6 out of 10 in severity, nonradiating. Denies any vision changes or focal deficits. Denies chest pain or shortness of breath. States she is not having any vaginal or urinary symptoms. Patient is also status post temporal lobe mass resection in 2015. PAST MEDICAL / SURGICAL / SOCIAL / FAMILY HISTORY      has a past medical history of Anxiety, Asthma, Depression, and Seizures ([address] has a past surgical history that includes brain surgery.      Social History     Socioeconomic History    Marital status: Single     Spouse name: Not on file    Number of children: Not on file    Years of education: Not on file    Highest education level: Not on file   Occupational History    Not on file   Tobacco Use    Smoking status: Current Every Day Smoker     Packs/day: 0.25     Years: 2.00     Pack years: 0.50     Types: Cigarettes    Smokeless tobacco: Never Used    Tobacco comment: 2-3 per day   Substance and Sexual Activity  Alcohol use: No    Drug use: Yes     Types: Marijuana Jake Ontiveros)     Comment: daily    Sexual activity: Yes     Partners: Male   Other Topics Concern    Not on file   Social History Narrative    Not on file     Social Determinants of Health     Financial Resource Strain: Low Risk     Difficulty of Paying Living Expenses: Not very hard   Food Insecurity: No Food Insecurity    Worried About Running Out of Food in the Last Year: Never true    Jaden of Food in the Last Year: Never true   Transportation Needs:     Lack of Transportation (Medical): Not on file    Lack of Transportation (Non-Medical): Not on file   Physical Activity:     Days of Exercise per Week: Not on file    Minutes of Exercise per Session: Not on file   Stress:     Feeling of Stress : Not on file   Social Connections:     Frequency of Communication with Friends and Family: Not on file    Frequency of Social Gatherings with Friends and Family: Not on file    Attends Hindu Services: Not on file    Active Member of [address] [place] [address] or Organizations: Not on file    Attends Club or Organization Meetings: Not on file    Marital Status: Not on file   Intimate Partner Violence:     Fear of Current or Ex-Partner: Not on file    Emotionally Abused: Not on file    Physically Abused: Not on file    Sexually Abused: Not on file   Housing Stability:     Unable to Pay for Housing in the Last Year: Not on file    Number of [place] in the Last Year: Not on file    Unstable Housing in the Last Year: Not on file       Family History   Problem Relation Age of Onset    Asthma Mother     Depression Mother     High Blood Pressure Mother         Allergies:  Patient has no known allergies. Home Medications:  Prior to Admission medications    Medication Sig Start Date End Date Taking?  Authorizing Provider   metroNIDAZOLE (FLAGYL) 500 MG tablet take 1 tablet by mouth twice a day for 5 days 12/23/21   Historical Provider, MD   fluconazole (DIFLUCAN) 150 MG tablet take 1 tablet by mouth AS A ONE TIME DOSE 12/23/21   Historical Provider, MD   norgestimate-ethinyl estradiol (ORTHO-CYCLEN) 0.25-35 MG-MCG per tablet take 1 tablet by mouth once daily 12/22/21   Historical Provider, MD   albuterol sulfate HFA (VENTOLIN HFA) 108 (90 Base) MCG/ACT inhaler Inhale 2 puffs into the lungs every 6 hours as needed for Wheezing 2/25/22   Castellon Overall, MD   levETIRAcetam (KEPPRA) 750 MG tablet Take 2 tablets by mouth 2 times daily 2/25/22 5/26/22  Castellon Overall, MD   folic acid (FOLVITE) 1 MG tablet Take 1 tablet by mouth daily 2/25/22 Rush Overall, MD   sertraline (ZOLOFT) 100 MG tablet Take 1 tablet by mouth daily 2/25/22 Rush Overall, MD   phenytoin (DILANTIN) 100 MG ER capsule Take 1 capsule by mouth 3 times daily 2/25/22 Rush Overall, MD   nicotine (NICODERM CQ) 7 MG/24HR Place 1 patch onto the skin every 24 hours 11/19/20 11/19/21  Get Goyal MD   nicotine polacrilex (NICORETTE) 2 MG gum Take 1 each by mouth as needed for Smoking cessation 11/19/20   Get Goyal MD   Prenatal Vit-Fe Fumarate-FA (PRENATAL VITAMIN PLUS LOW IRON) 27-1 MG TABS Take 1 tablet by mouth daily 11/19/20   Get Goyal MD   cetirizine (ZYRTEC ALLERGY) 10 MG tablet Take 1 tablet by mouth daily  Patient not taking: Reported on 2/25/2022 7/12/19   Hoda Ellison MD       REVIEW OFSYSTEMS    (2-9 systems for level 4, 10 or more for level 5)      Review of Systems   Constitutional: Negative for diaphoresis and fever. HENT: Negative for congestion. Eyes: Negative for visual disturbance. Respiratory: Negative for shortness of breath. Cardiovascular: Negative for chest pain. Gastrointestinal: Negative for abdominal pain, nausea and vomiting. Endocrine: Negative for polyuria. Genitourinary: Negative for dysuria. Musculoskeletal: Negative for back pain. Skin: Negative for wound. Neurological: Positive for seizures and headaches. Psychiatric/Behavioral: Negative for confusion. PHYSICAL EXAM   (up to 7 for level 4, 8 or more forlevel 5)      ED TRIAGE VITALS BP: (!) 139/102, Temp: 98.2 °F (36.8 °C), Pulse: 77, Resp: 18, SpO2: 99 %    Vitals:    04/12/22 0537   BP: (!) 139/102   Pulse: 77   Resp: 18   Temp: 98.2 °F (36.8 °C)   TempSrc: Oral   SpO2: 99%   Weight: 140 lb (63.5 kg)       Physical Exam  Constitutional:       General: She is not in acute distress. Appearance: She is well-developed. HENT:      Head: Normocephalic and atraumatic. Nose: Nose normal.   Eyes:      Pupils: Pupils are equal, round, and reactive to light. Cardiovascular:      Rate and Rhythm: Normal rate and regular rhythm. Heart sounds: No murmur heard. Pulmonary:      Effort: Pulmonary effort is normal. No respiratory distress. Breath sounds: No stridor. No wheezing. Abdominal:      General: There is no distension. Palpations: Abdomen is soft. Tenderness: There is no abdominal tenderness. Musculoskeletal:         General: No tenderness. Normal range of motion. Cervical back: Normal range of motion and neck supple. Skin:     General: Skin is warm and dry. Capillary Refill: Capillary refill takes less than 2 seconds. Findings: No erythema or rash. Neurological:      Mental Status: She is alert and oriented to person, place, and time. Sensory: No sensory deficit.       Deep Tendon Reflexes: Reflexes normal.   Psychiatric:         Behavior: Behavior normal.         DIFFERENTIAL  DIAGNOSIS     PLAN (LABS / IMAGING / EKG):  Orders Placed This Encounter   Procedures    CBC with Auto Differential    Basic Metabolic Panel w/ Reflex to MG    Levetiracetam Level    Phenytoin Level, Total    HCG Qualitative, Serum    Inpatient consult to Neurology    Saline lock IV    Insert peripheral IV       MEDICATIONS ORDERED:  Orders Placed This Encounter   Medications    acetaminophen (TYLENOL) tablet 1,000 mg  levETIRAcetam (KEPPRA) 1000 mg/100 mL IVPB       DDX:     Breakthrough seizure    Initial MDM/Plan: 32 y.o. female who presents with seizure    Status post temporal lobe resection 2015, history of seizure disorders on Keppra and Dilantin  States that she did have a recent med change  Keppra level ordered and is therapeutic  Given 1 g of load to cover for breakthrough seizure  Phenytoin level below threshold however will get neurology for evaluation and med adjustment  Denies any fevers or chills or any other symptoms, has a mild headache  Tylenol for headache  No electrolyte abnormality  Expecting discharge  Care signed out to resident    Disposition:  Care signed out to resident    DIAGNOSTIC RESULTS / Bayie Noun / MDM     LABS:  Results for orders placed or performed during the hospital encounter of 04/12/22   CBC with Auto Differential   Result Value Ref Range    WBC 6.8 3.5 - 11.3 k/uL    RBC 4.04 3.95 - 5.11 m/uL    Hemoglobin 13.0 11.9 - 15.1 g/dL    Hematocrit 38.5 36.3 - 47.1 %    MCV 95.3 82.6 - 102.9 fL    MCH 32.2 25.2 - 33.5 pg    MCHC 33.8 28.4 - 34.8 g/dL    RDW 12.5 11.8 - 14.4 %    Platelets 760 871 - 535 k/uL    MPV 9.9 8.1 - 13.5 fL    NRBC Automated 0.0 0.0 per 100 WBC    Seg Neutrophils 38 36 - 65 %    Lymphocytes 51 (H) 24 - 43 %    Monocytes 8 3 - 12 %    Eosinophils % 2 1 - 4 %    Basophils 1 0 - 2 %    Immature Granulocytes 0 0 %    Segs Absolute 2.60 1.50 - 8.10 k/uL    Absolute Lymph # 3.44 1.10 - 3.70 k/uL    Absolute Mono # 0.53 0.10 - 1.20 k/uL    Absolute Eos # 0.16 0.00 - 0.44 k/uL    Basophils Absolute 0.05 0.00 - 0.20 k/uL    Absolute Immature Granulocyte <0.03 0.00 - 0.30 k/uL   Basic Metabolic Panel w/ Reflex to MG   Result Value Ref Range    Glucose 88 70 - 99 mg/dL    BUN 8 6 - 20 mg/dL    CREATININE 0.86 0.50 - 0.90 mg/dL    Calcium 8.6 8.6 - 10.4 mg/dL    Sodium 140 135 - 144 mmol/L    Potassium 3.9 3.7 - 5.3 mmol/L    Chloride 106 98 - 107 mmol/L    CO2 24 20 - 31 mmol/L    Anion Gap 10 9 - 17 mmol/L    GFR Non-African American >60 >60 mL/min    GFR African American >60 >60 mL/min    GFR Comment         Levetiracetam Level   Result Value Ref Range    Levetiracetam Lvl 68 ug/mL   Phenytoin Level, Total   Result Value Ref Range    Phenytoin Lvl <0.8 (L) 10.0 - 20.0 ug/mL   HCG Qualitative, Serum   Result Value Ref Range    hCG Qual NEGATIVE NEGATIVE       RADIOLOGY:  No orders to display       EKG  EKG Interpretation    Interpreted by me    Rhythm: normal sinus   Rate: normal  Axis: normal  Ectopy: none  Conduction: normal  ST Segments: no acute change  T Waves: no acute change  Q Waves: none    Clinical Impression: no acute changes and normal EKG    All EKG's are interpreted by the Emergency Department Physicianwho either signs or Co-signs this chart in the absence of a cardiologist.    EMERGENCY DEPARTMENT COURSE:  ED Course as of 04/12/22 0615   Tue Apr 12, 2022   0542 Patient seen and assessed in the emergency department no acute respiratory cardiovascular distress. Patient here with seizures, states she had 2 seizures this morning, no witnesses for the seizures, states she came to realize she did not remember where she was and what was going on, had one episode of emesis after. States she gets seizures sometimes when she is about to go on her period. Does have seizure disorder history is currently on Keppra and Dilantin for seizure prophylaxis. Denies recent fevers or chills or traumatic injuries recent falls. States she is currently has a mild headache that is 6 out of 10 in severity, nonradiating. Denies any vision changes or focal deficits. Denies chest pain or shortness of breath. States she is not having any vaginal or urinary symptoms. Patient is also status post temporal lobe mass resection in 2015.  [PS]   3083 Phenytoin Lvl(!): <0.8 [PS]      ED Course User Index  [PS] Patsy Winkler MD          PROCEDURES:  None    CONSULTS:  IP CONSULT TO NEUROLOGY    CRITICAL CARE:  Please see attending note    FINAL IMPRESSION      1. Breakthrough seizure ([address]          DISPOSITION / PLAN     DISPOSITION    Care signed out to Dr. Joy Section TO:  No follow-up provider specified.     DISCHARGE MEDICATIONS:  New Prescriptions    No medications on file       Para Necessary, MD  Emergency Medicine Resident    (Please note that portions of this note were completed with a voice recognition program.Efforts were made to edit the dictations but occasionally words are mis-transcribed.)       Para Necessary, MD  Resident  04/12/22 6354

## 2022-04-12 NOTE — ED PROVIDER NOTES
[facility]     Emergency Department     Faculty Attestation    I performed a history and physical examination of the patient and discussed management with the resident. I have reviewed and agree with the residents findings including all diagnostic interpretations, and treatment plans as written. Any areas of disagreement are noted on the chart. I was personally present for the key portions of any procedures. I have documented in the chart those procedures where I was not present during the key portions. I have reviewed the emergency nurses triage note. I agree with the chief complaint, past medical history, past surgical history, allergies, medications, social and family history as documented unless otherwise noted below. Documentation of the HPI, Physical Exam and Medical Decision Making performed by scribchris is based on my personal performance of the HPI, PE and MDM. For Physician Assistant/ Nurse Practitioner cases/documentation I have personally evaluated this patient and have completed at least one if not all key elements of the E/M (history, physical exam, and MDM). Additional findings are as noted. 33 yo F c/o seizure, possibly post ictal, no fever, no urinary incontinence, no oral injury,   On keppra & dilantin, sees neurology,   pe Dayana RN escort for exam, leora, eomi, gcs 15, no cervical tenderness or deformity,   Abdomen non tender, no distension, no rigidity    hcg -, keppra 68, pt just started phenytoin yesterday (<0.8),   Neuro consulted & recommends phenytoin 100 TID & Keppra 1500 BID, neuro cleared pt for dc after dilantin load,     EKG Interpretation    Interpreted by me  Normal sinus,  Heart rate 76, no ischemia, normal axis, QT corrected 429    CRITICAL CARE: There was a high probability of clinically significant/life threatening deterioration in this patient's condition which required my urgent intervention.   Total critical care time was 10 minutes. This excludes any time for separately reportable procedures.        [facility], DO  04/12/22 [facility], DO  04/12/22 4750 [facility], DO  04/12/22 [id number]

## 2022-04-12 NOTE — ED PROVIDER NOTES
[facility] [address] [facility]  Emergency Department  Emergency Medicine Resident Sign-out     Care of Jillian Merchant was assumed from Dr. Kandy Meckel and is being seen for Seizures  . The patient's initial evaluation and plan have been discussed with the prior provider who initially evaluated the patient. EMERGENCY DEPARTMENT COURSE / MEDICAL DECISION MAKING:       MEDICATIONS GIVEN:  Orders Placed This Encounter   Medications    acetaminophen (TYLENOL) tablet 1,000 mg    levETIRAcetam (KEPPRA) 1000 mg/100 mL IVPB    fosphenytoin (CEREBYX) 1,000 mg PE in dextrose 5 % 100 mL IVPB (loading dose)       LABS / RADIOLOGY:     Labs Reviewed   CBC WITH AUTO DIFFERENTIAL - Abnormal; Notable for the following components:       Result Value    Lymphocytes 51 (*)     All other components within normal limits   PHENYTOIN LEVEL, TOTAL - Abnormal; Notable for the following components:    Phenytoin Lvl <0.8 (*)     All other components within normal limits   BASIC METABOLIC PANEL W/ REFLEX TO MG FOR LOW K   LEVETIRACETAM LEVEL   HCG, SERUM, QUALITATIVE       No results found. RECENT VITALS:     Temp: 98.2 °F (36.8 °C),  Pulse: 64, Resp: 12, BP: (!) 122/95, SpO2: 97 %    This patient is a 32 y.o. Female with suspected seizure. Hx of seizures, on pheyntoin and keppra. Comliant. Thinks had x2 seizures, usually gets seizures when near her menstrual cycle. History of brain surgery. Loaded with 1 g of Keppra. Acting appropriate in the emergency room, at baseline without focal neurological deficits, awaiting neurology consultation and BMP. Lab work unremarkable. Neurology saw patient at bedside. Recommended loading with fosphenytoin. Continuing Dilantin and Keppra as outpatient. Patient states that she just started Dilantin a couple days ago. Follow-up to neurology. Neurologically intact upon discharge. OUTSTANDING TASKS / RECOMMENDATIONS:    1. Follow-up labs  2. Follow-up neuro  3.  Disposition per neurology but likely discharge. FINAL IMPRESSION:     1. Breakthrough seizure ([address]        DISPOSITION:         DISPOSITION:  [x]  Discharge   []  Transfer -    []  Admission -     []  Against Medical Advice   []  Eloped   FOLLOW-UP: Ravinder Ballesteros MD  [address]   [facility] [address] [address] [facility] [address] [phone]         DISCHARGE MEDICATIONS: Discharge Medication List as of 4/12/2022  7:00 AM              Simone Núñez DO  Emergency Medicine Resident  [facility] [place]  Resident  04/12/22 3431

## 2022-04-12 NOTE — ED NOTES
Pt ambulated to ED after two unwitnessed episodes of absent seizures today. Pt states she has a hx of absent seizures that she takes Keppra 4x a day for. She states they present like she is \"staring off into space\" pt is amnestic to the entire episode and states she feels nauseous and \"foggy\" afterwards. Pt denies LOC, denies loss of bowel or bladder control. She states she hasn't had a seizure in about 3 months. States they usually come on when she is ovulating due to the increase in hormones. Pt is alert and oriented x4, RR even and nonlabored. She denies any further needs at this time.       Perez Lino RN  04/12/22 3549

## 2022-04-12 NOTE — CONSULTS
[facility] Neurology   IN-PATIENT SERVICE      NEUROLOGY CONSULT  NOTE            Date:   4/12/2022  Patient name:  Michael Mares  Date of admission:  4/12/2022  YOB: 1995      Chief Complaint:     Chief Complaint   Patient presents with    Seizures       Reason for Consult:      Seizures    History of Present Illness: The patient is a 32 y.o. female with history of seizures on phenytoin and Keppra, compliant, history of brain surgery, history of seizures for 7 to 8 years now, patient has been on Keppra 1.5 g twice daily patient follows with neurologist outpatient,    Of note patient last follow-up was in 11/10/2022: For multiple absent seizures, around 4-5 absence seizure in the month at that time, usually around ovulation denies any aura, patient denies any generalized tonic-clonic seizure or fall body seizure since last visit, MRI of brain and EEG was ordered  -Patient has a history of left temporal lobe mass resection in 2015, she is to follow-up with [facility] neurology, patient started having seizures when she was 23years old, averaging 3-4 seizures per year at that time,      Past Medical History:     Past Medical History:   Diagnosis Date    Anxiety     Asthma 7/12/2019    Depression 2/25/2022    Seizures ([address]         Past Surgical History:     Past Surgical History:   Procedure Laterality Date    BRAIN SURGERY          Medications Prior to Admission:     Prior to Admission medications    Medication Sig Start Date End Date Taking?  Authorizing Provider   metroNIDAZOLE (FLAGYL) 500 MG tablet take 1 tablet by mouth twice a day for 5 days 12/23/21   Historical Provider, MD   fluconazole (DIFLUCAN) 150 MG tablet take 1 tablet by mouth AS A ONE TIME DOSE 12/23/21   Historical Provider, MD   norgestimate-ethinyl estradiol (ORTHO-CYCLEN) 0.25-35 MG-MCG per tablet take 1 tablet by mouth once daily 12/22/21   Historical Provider, MD   albuterol sulfate HFA (VENTOLIN HFA) 108 (90 Base) MCG/ACT inhaler Inhale 2 puffs into the lungs every 6 hours as needed for Wheezing 2/25/22   Danna Ho MD   levETIRAcetam (KEPPRA) 750 MG tablet Take 2 tablets by mouth 2 times daily 2/25/22 5/26/22  Danna Ho MD   folic acid (FOLVITE) 1 MG tablet Take 1 tablet by mouth daily 2/25/22   Danna Ho MD   sertraline (ZOLOFT) 100 MG tablet Take 1 tablet by mouth daily 2/25/22   Danna Ho MD   phenytoin (DILANTIN) 100 MG ER capsule Take 1 capsule by mouth 3 times daily 2/25/22   Danna Ho MD   nicotine (NICODERM CQ) 7 MG/24HR Place 1 patch onto the skin every 24 hours 11/19/20 11/19/21  Xin Hernandez MD   nicotine polacrilex (NICORETTE) 2 MG gum Take 1 each by mouth as needed for Smoking cessation 11/19/20   Xin Hernandez MD   Prenatal Vit-Fe Fumarate-FA (PRENATAL VITAMIN PLUS LOW IRON) 27-1 MG TABS Take 1 tablet by mouth daily 11/19/20   Xin Hernandez MD   cetirizine (ZYRTEC ALLERGY) 10 MG tablet Take 1 tablet by mouth daily  Patient not taking: Reported on 2/25/2022 7/12/19   Johnny Barcenas MD        Allergies:     Patient has no known allergies. Social History:     Tobacco:    reports that she has been smoking cigarettes. She has a 0.50 pack-year smoking history. She has never used smokeless tobacco.  Alcohol:      reports no history of alcohol use. Drug Use:  reports current drug use. Drug: Marijuana Antoinette Beat).     Family History:     Family History   Problem Relation Age of Onset    Asthma Mother     Depression Mother     High Blood Pressure Mother        Review of Systems:       Constitutional Negative for fever and chills   HEENT Negative for ear discharge, ear pain, nosebleed   Eyes Negative for photophobia, pain and discharge   Respiratory Negative for hemoptysis and sputum   Cardiovascular Negative for orthopnea, claudication and PND   Gastrointestinal Negative for abdominal pain, diarrhea, blood in stool   Musculoskeletal Negative for joint pain, negative for myalgia Skin Negative for rash or itching   hematology Negative for ecchymosis, anemia   Psychiatric Negative for suicidal ideation, anxiety, depression, hallucinations       Physical Exam:   BP (!) 139/102   Pulse 77   Temp 98.2 °F (36.8 °C) (Oral)   Resp 18   Wt 140 lb (63.5 kg)   LMP 2022   SpO2 99%   Breastfeeding Unknown   BMI 25.61 kg/m²   Temp (24hrs), Av.2 °F (36.8 °C), Min:98.2 °F (36.8 °C), Max:98.2 °F (36.8 °C)        General examination:      General Appearance:  alert, well appearing, and in no acute distress  HEENT: Normocephalic, atraumatic, moist mucus membranes  Neck: supple, no carotid bruits, (-) nuchal rigidity  Lungs:  Respirations unlabored, chest wall no deformity, BS normal  Cardiovascular: normal rate, regular rhythm  Abdomen: Soft, nontender, nondistended, normal bowel sounds  Skin: No gross lesions, rashes, bruising or bleeding on exposed skin area  Extremities:  peripheral pulses palpable, no cyanosis, clubbing or edema  Psych: normal affect      Neurological examination:      Mental status   Alert and oriented x 3; following all commands;   speech is fluent, no dysarthria, aphasia. Cranial nerves   II - visual fields intact to confrontation; pupils reactive  III, IV, VI - extraocular muscles intact; no HOLLY; no nystagmus; no ptosis   V - normal facial sensation                                                               VII - normal facial symmetry                                                             VIII - intact hearing                                                                             IX, X - symmetrical palate elevation                                               XI - symmetrical shoulder shrug                                                       XII - midline tongue without atrophy or fasciculation     Motor function  Strength:   5/5 RUE, 5/5 RLE  5/5 LUE, 5/5  LLE  Normal bulk and tone.       Sensory function Intact to touch, pin, vibration,

## 2022-04-26 ENCOUNTER — TELEPHONE (OUTPATIENT)
Dept: FAMILY MEDICINE CLINIC | Age: 27
End: 2022-04-26

## 2022-04-26 ENCOUNTER — TELEMEDICINE (OUTPATIENT)
Dept: FAMILY MEDICINE CLINIC | Age: 27
End: 2022-04-26
Payer: MEDICARE

## 2022-04-26 DIAGNOSIS — F32.A DEPRESSION, UNSPECIFIED DEPRESSION TYPE: ICD-10-CM

## 2022-04-26 DIAGNOSIS — G40.909 SEIZURE DISORDER (HCC): Primary | ICD-10-CM

## 2022-04-26 PROCEDURE — 99213 OFFICE O/P EST LOW 20 MIN: CPT | Performed by: STUDENT IN AN ORGANIZED HEALTH CARE EDUCATION/TRAINING PROGRAM

## 2022-04-26 NOTE — PROGRESS NOTES
Visit Information    Have you changed or started any medications since your last visit including any over-the-counter medicines, vitamins, or herbal medicines? no   Have you stopped taking any of your medications? Is so, why? -  no  Are you having any side effects from any of your medications? - no    Have you seen any other physician or provider since your last visit?  no   Have you had any other diagnostic tests since your last visit?  no   Have you been seen in the emergency room and/or had an admission in a hospital since we last saw you?  yes -    Have you had your routine dental cleaning in the past 6 months?  no     Do you have an active MyChart account? If no, what is the barrier?   No:     Patient Care Team:  Issac Francois MD as PCP - General (Family Medicine)    Medical History Review  Past Medical, Family, and Social History reviewed and does not contribute to the patient presenting condition    Health Maintenance   Topic Date Due    Varicella vaccine (1 of 2 - 2-dose childhood series) Never done    COVID-19 Vaccine (1) Never done    Pneumococcal 0-64 years Vaccine (1 - PCV) Never done    Hepatitis C screen  Never done    Pap smear  Never done    Flu vaccine (Season Ended) 09/01/2022    Depression Monitoring  02/25/2023    DTaP/Tdap/Td vaccine (2 - Td or Tdap) 08/17/2027    HIV screen  Completed    Hepatitis A vaccine  Aged Out    Hepatitis B vaccine  Aged Out    Hib vaccine  Aged Out    Meningococcal (ACWY) vaccine  Aged Out

## 2022-04-26 NOTE — TELEPHONE ENCOUNTER
----- Message from Beth Elizabeth sent at 4/25/2022  4:53 PM EDT -----  Subject: Message to Provider    QUESTIONS  Information for Provider? Pt would like a Antibiotic for a bacterial   infection [id number]   ---------------------------------------------------------------------------  --------------  CALL BACK INFO  What is the best way for the office to contact you? OK to leave message on   voicemail  Preferred Call Back Phone Number? [phone]  ---------------------------------------------------------------------------  --------------  SCRIPT ANSWERS  Relationship to Patient?  Self

## 2022-04-26 NOTE — PROGRESS NOTES
[facility] Medicine Residency Program - Virtual Visit        Simmie Sever is a 32 y.o. female evaluated via telephone on 4/26/2022. Consent:  She and/or health care decision maker is aware that that she may receive a bill for this telephone service, depending on her insurance coverage, and has provided verbal consent to proceed: Yes      Documentation:  I communicated with the patient and/or health care decision maker about Pt was concerned about labs she saw on mychart, she fears she may have bacterial infection. Review of labs shows everything WNL, no signs of infection. Pt is doing well on Zoloft 100mg, but she is still having seizures. She has a follow up visit with neurology   . Details of this discussion including any medical advice provided:    ASSESSMENT/PLAN:    1. Seizure disorder ([address]  - pt seen in ED, meds adjusted, has follow up with neurology, is on driving restriction    2. Depression, unspecified depression type  - Improving with zoloft 100mg, follow up 4 weeks. Requested Prescriptions      No prescriptions requested or ordered in this encounter       There are no discontinued medications. Return in about 4 weeks (around 5/24/2022) for Depression. I affirm this is a Patient Initiated Episode with a Patient who has not had a related appointment within my department in the past 7 days or scheduled within the next 24 hours. Patient identification was verified at the start of the visit: Yes    Total Time: minutes: 11-20 minutes    Note: not billable if this call serves to triage the patient into an appointment for the relevant concern      Livia Brewster.  Khari Mckeon MD  Family Medicine PGY-3  04/26/22 at 4:37 PM

## 2022-04-26 NOTE — PROGRESS NOTES
Attending Physician Statement    Wt Readings from Last 3 Encounters:   04/12/22 140 lb (63.5 kg)   01/20/22 140 lb (63.5 kg)   09/02/21 130 lb (59 kg)     Temp Readings from Last 3 Encounters:   04/12/22 98.2 °F (36.8 °C) (Oral)   10/15/21 98 °F (36.7 °C)   10/06/21 97.3 °F (36.3 °C)     BP Readings from Last 3 Encounters:   04/12/22 (!) 122/95   01/20/22 120/82   10/15/21 (!) 125/90     Pulse Readings from Last 3 Encounters:   04/12/22 64   01/20/22 78   10/15/21 80         I have discussed the care of Keegan Bro, including pertinent history and exam findings with the resident. I have reviewed the key elements of all parts of the encounter with the resident. I agree with the assessment, plan and orders as documented by the resident.   (GE Modifier)

## 2022-06-18 ENCOUNTER — HOSPITAL ENCOUNTER (EMERGENCY)
Age: 27
Discharge: HOME | End: 2022-06-18
Attending: EMERGENCY MEDICINE
Payer: MEDICARE

## 2022-06-18 VITALS
SYSTOLIC BLOOD PRESSURE: 122 MMHG | HEART RATE: 105 BPM | OXYGEN SATURATION: 100 % | BODY MASS INDEX: 25.76 KG/M2 | DIASTOLIC BLOOD PRESSURE: 81 MMHG | HEIGHT: 62 IN | WEIGHT: 140 LBS | TEMPERATURE: 97.5 F | RESPIRATION RATE: 22 BRPM

## 2022-06-18 DIAGNOSIS — G40.919 BREAKTHROUGH SEIZURE (HCC): Primary | ICD-10-CM

## 2022-06-18 LAB
ABSOLUTE EOS #: 0.25 K/UL (ref 0–0.4)
ABSOLUTE IMMATURE GRANULOCYTE: 0 K/UL (ref 0–0.3)
ABSOLUTE LYMPH #: 5.67 K/UL (ref 1–4.8)
ABSOLUTE MONO #: 0.63 K/UL (ref 0.1–0.8)
ALBUMIN SERPL-MCNC: 4.6 G/DL (ref 3.5–5.2)
ALBUMIN/GLOBULIN RATIO: 1.5 (ref 1–2.5)
ALP BLD-CCNC: 121 U/L (ref 35–104)
ALT SERPL-CCNC: 29 U/L (ref 5–33)
ANION GAP SERPL CALCULATED.3IONS-SCNC: 29 MMOL/L (ref 9–17)
AST SERPL-CCNC: 27 U/L
ATYPICAL LYMPHOCYTE ABSOLUTE COUNT: 0.25 K/UL
ATYPICAL LYMPHOCYTES: 2 %
BASOPHILS # BLD: 0 % (ref 0–2)
BASOPHILS ABSOLUTE: 0 K/UL (ref 0–0.2)
BILIRUB SERPL-MCNC: 0.25 MG/DL (ref 0.3–1.2)
BUN BLDV-MCNC: 8 MG/DL (ref 6–20)
CALCIUM SERPL-MCNC: 8.8 MG/DL (ref 8.6–10.4)
CHLORIDE BLD-SCNC: 100 MMOL/L (ref 98–107)
CO2: 11 MMOL/L (ref 20–31)
CREAT SERPL-MCNC: 0.99 MG/DL (ref 0.5–0.9)
EOSINOPHILS RELATIVE PERCENT: 2 % (ref 1–4)
GFR AFRICAN AMERICAN: >60 ML/MIN
GFR NON-AFRICAN AMERICAN: >60 ML/MIN
GFR SERPL CREATININE-BSD FRML MDRD: ABNORMAL ML/MIN/{1.73_M2}
GLUCOSE BLD-MCNC: 96 MG/DL (ref 70–99)
HCG QUALITATIVE: NEGATIVE
HCT VFR BLD CALC: 44.2 % (ref 36.3–47.1)
HEMOGLOBIN: 14.2 G/DL (ref 11.9–15.1)
IMMATURE GRANULOCYTES: 0 %
KEPPRA: <2 UG/ML
LYMPHOCYTES # BLD: 45 % (ref 24–44)
MAGNESIUM: 1.8 MG/DL (ref 1.6–2.6)
MCH RBC QN AUTO: 32.9 PG (ref 25.2–33.5)
MCHC RBC AUTO-ENTMCNC: 32.1 G/DL (ref 28.4–34.8)
MCV RBC AUTO: 102.3 FL (ref 82.6–102.9)
MONOCYTES # BLD: 5 % (ref 1–7)
MORPHOLOGY: NORMAL
NRBC AUTOMATED: 0 PER 100 WBC
PDW BLD-RTO: 12.6 % (ref 11.8–14.4)
PHENYTOIN LEVEL: <0.8 UG/ML (ref 10–20)
PLATELET # BLD: 222 K/UL (ref 138–453)
PMV BLD AUTO: 11.6 FL (ref 8.1–13.5)
POTASSIUM SERPL-SCNC: 3.7 MMOL/L (ref 3.7–5.3)
RBC # BLD: 4.32 M/UL (ref 3.95–5.11)
SEG NEUTROPHILS: 46 % (ref 36–66)
SEGMENTED NEUTROPHILS ABSOLUTE COUNT: 5.8 K/UL (ref 1.8–7.7)
SODIUM BLD-SCNC: 140 MMOL/L (ref 135–144)
TOTAL PROTEIN: 7.6 G/DL (ref 6.4–8.3)
WBC # BLD: 12.6 K/UL (ref 3.5–11.3)

## 2022-06-18 PROCEDURE — 84703 CHORIONIC GONADOTROPIN ASSAY: CPT

## 2022-06-18 PROCEDURE — 80053 COMPREHEN METABOLIC PANEL: CPT

## 2022-06-18 PROCEDURE — 85025 COMPLETE CBC W/AUTO DIFF WBC: CPT

## 2022-06-18 PROCEDURE — 2580000003 HC RX 258: Performed by: HEALTH CARE PROVIDER

## 2022-06-18 PROCEDURE — 96375 TX/PRO/DX INJ NEW DRUG ADDON: CPT

## 2022-06-18 PROCEDURE — 99284 EMERGENCY DEPT VISIT MOD MDM: CPT

## 2022-06-18 PROCEDURE — 6360000002 HC RX W HCPCS: Performed by: HEALTH CARE PROVIDER

## 2022-06-18 PROCEDURE — 80185 ASSAY OF PHENYTOIN TOTAL: CPT

## 2022-06-18 PROCEDURE — 6370000000 HC RX 637 (ALT 250 FOR IP): Performed by: HEALTH CARE PROVIDER

## 2022-06-18 PROCEDURE — 96365 THER/PROPH/DIAG IV INF INIT: CPT

## 2022-06-18 PROCEDURE — 80177 DRUG SCRN QUAN LEVETIRACETAM: CPT

## 2022-06-18 PROCEDURE — 83735 ASSAY OF MAGNESIUM: CPT

## 2022-06-18 PROCEDURE — 93005 ELECTROCARDIOGRAM TRACING: CPT | Performed by: HEALTH CARE PROVIDER

## 2022-06-18 RX ORDER — ACETAMINOPHEN 500 MG
1000 TABLET ORAL ONCE
Status: COMPLETED | OUTPATIENT
Start: 2022-06-18 | End: 2022-06-18

## 2022-06-18 RX ORDER — LEVETIRACETAM 15 MG/ML
1500 INJECTION INTRAVASCULAR ONCE
Status: COMPLETED | OUTPATIENT
Start: 2022-06-18 | End: 2022-06-18

## 2022-06-18 RX ORDER — ONDANSETRON 2 MG/ML
4 INJECTION INTRAMUSCULAR; INTRAVENOUS ONCE
Status: COMPLETED | OUTPATIENT
Start: 2022-06-18 | End: 2022-06-18

## 2022-06-18 RX ORDER — 0.9 % SODIUM CHLORIDE 0.9 %
1000 INTRAVENOUS SOLUTION INTRAVENOUS ONCE
Status: COMPLETED | OUTPATIENT
Start: 2022-06-18 | End: 2022-06-18

## 2022-06-18 RX ADMIN — ONDANSETRON 4 MG: 2 INJECTION, SOLUTION INTRAMUSCULAR; INTRAVENOUS at 16:08

## 2022-06-18 RX ADMIN — ACETAMINOPHEN 1000 MG: 500 TABLET ORAL at 16:45

## 2022-06-18 RX ADMIN — SODIUM CHLORIDE 1000 ML: 9 INJECTION, SOLUTION INTRAVENOUS at 16:11

## 2022-06-18 RX ADMIN — LEVETIRACETAM 1500 MG: 15 INJECTION INTRAVENOUS at 16:32

## 2022-06-18 ASSESSMENT — PAIN DESCRIPTION - LOCATION
LOCATION: HEAD
LOCATION: HEAD

## 2022-06-18 ASSESSMENT — ENCOUNTER SYMPTOMS
SHORTNESS OF BREATH: 0
ABDOMINAL PAIN: 0
VOICE CHANGE: 0
SORE THROAT: 0
DIARRHEA: 0
VOMITING: 1
NAUSEA: 0
VOMITING: 0
NAUSEA: 1
CONSTIPATION: 0
PHOTOPHOBIA: 0
COUGH: 0

## 2022-06-18 ASSESSMENT — PAIN DESCRIPTION - DESCRIPTORS: DESCRIPTORS: ACHING

## 2022-06-18 ASSESSMENT — PAIN - FUNCTIONAL ASSESSMENT: PAIN_FUNCTIONAL_ASSESSMENT: 0-10

## 2022-06-18 ASSESSMENT — PAIN SCALES - GENERAL: PAINLEVEL_OUTOF10: 8

## 2022-06-18 NOTE — ED PROVIDER NOTES
[address] ED  Emergency Department Encounter  Emergency Medicine Resident     Pt Name: Héctor Mtz  MRN: [id number]  Jennifergfjuan luis 1995  Date of evaluation: 6/18/22  PCP:  Flaquito Dacosta MD    CHIEF COMPLAINT       Chief Complaint   Patient presents with    Seizures     3 PTA       HISTORY OFPRESENT ILLNESS  (Location/Symptom, Timing/Onset, Context/Setting, Quality, Duration, Modifying Factors,Severity.)      Héctor Mtz is a 32 y.o. female with history of temporal lobe mass resection 2015, seizure disorder, asthma who presents with concern for seizures. Patient was brought in via EMS. She had 3 seizures prior to arrival.  Each seizure lasted about 10 minutes and resolve spontaneously. Patient was given 2 mg of Versed by EMS in route to the hospital.  Patient has a known history of seizure disorder currently on Keppra and Dilantin. Patient states that she has been taking her medications as prescribed. Denies any concerns for any recent prodromal symptoms for infections. Patient states that she went out last night had a lot to drink, woke up this morning and was not feeling well. Has had a headache, nausea, multiple episodes of nonbilious none bloody vomiting. States she has had episodes the past where she is going out during the night for and have a seizure the next day. Patient seen in the emergency department back on 4/12/2022 seizures at this time as well. She was loaded with Dilantin and fosphenytoin. Patient is on Keppra 1500 mg twice daily was noted to be on Dilantin. PAST MEDICAL / SURGICAL / SOCIAL / FAMILY HISTORY      has a past medical history of Anxiety, Asthma, Depression, and Seizures ([address] has a past surgical history that includes brain surgery.     Social History     Socioeconomic History    Marital status: Single     Spouse name: Not on file    Number of children: Not on file    Years of education: Not on file    Highest education level: has no known allergies. Home Medications:  Prior to Admission medications    Medication Sig Start Date End Date Taking? Authorizing Provider   metroNIDAZOLE (FLAGYL) 500 MG tablet take 1 tablet by mouth twice a day for 5 days 12/23/21   Historical Provider, MD   fluconazole (DIFLUCAN) 150 MG tablet take 1 tablet by mouth AS A ONE TIME DOSE 12/23/21   Historical Provider, MD   norgestimate-ethinyl estradiol (ORTHO-CYCLEN) 0.25-35 MG-MCG per tablet take 1 tablet by mouth once daily 12/22/21   Historical Provider, MD   albuterol sulfate HFA (VENTOLIN HFA) 108 (90 Base) MCG/ACT inhaler Inhale 2 puffs into the lungs every 6 hours as needed for Wheezing 2/25/22   Flaquito Dacosta MD   levETIRAcetam (KEPPRA) 750 MG tablet Take 2 tablets by mouth 2 times daily 2/25/22 5/26/22  Flaquito Dacosta MD   folic acid (FOLVITE) 1 MG tablet Take 1 tablet by mouth daily 2/25/22   Flaquito Dacosta MD   sertraline (ZOLOFT) 100 MG tablet Take 1 tablet by mouth daily 2/25/22   Flaquito Dacosta MD   phenytoin (DILANTIN) 100 MG ER capsule Take 1 capsule by mouth 3 times daily 2/25/22   Flaquito Dacosta MD   nicotine (NICODERM CQ) 7 MG/24HR Place 1 patch onto the skin every 24 hours 11/19/20 11/19/21  Sridevi Alcocer MD   nicotine polacrilex (NICORETTE) 2 MG gum Take 1 each by mouth as needed for Smoking cessation 11/19/20   Sridevi Alcocer MD   Prenatal Vit-Fe Fumarate-FA (PRENATAL VITAMIN PLUS LOW IRON) 27-1 MG TABS Take 1 tablet by mouth daily 11/19/20   Sridevi Alcocer MD   cetirizine (ZYRTEC ALLERGY) 10 MG tablet Take 1 tablet by mouth daily 7/12/19   Lubna Rangel MD       REVIEW OF SYSTEMS    (2-9 systems for level 4, 10 or more for level 5)      Review of Systems   Constitutional: Negative for chills and fever. HENT: Negative for ear pain, hearing loss and sore throat. Eyes: Negative for visual disturbance. Respiratory: Negative for shortness of breath. Cardiovascular: Negative for chest pain.    Gastrointestinal: Positive for nausea and vomiting. Negative for abdominal pain, constipation and diarrhea. Genitourinary: Negative for difficulty urinating and dysuria. Musculoskeletal: Negative for arthralgias and myalgias. Neurological: Positive for seizures. Negative for numbness. Psychiatric/Behavioral: Negative for agitation and confusion. PHYSICAL EXAM   (up to 7 for level 4, 8 or more for level 5)     INITIAL VITALS:    height is 5' 2\" (1.575 m) and weight is 140 lb (63.5 kg). Her oral temperature is 97.5 °F (36.4 °C). Her blood pressure is 102/64 and her pulse is 105 (abnormal). Her respiration is 22 and oxygen saturation is 99%. Physical Exam  Vitals and nursing note reviewed. Constitutional:       General: She is not in acute distress. Appearance: She is well-developed. She is not diaphoretic. HENT:      Head: Normocephalic and atraumatic. Right Ear: External ear normal.      Left Ear: External ear normal.      Nose: Nose normal.   Eyes:      Conjunctiva/sclera: Conjunctivae normal.   Neck:      Trachea: No tracheal deviation. Cardiovascular:      Rate and Rhythm: Normal rate and regular rhythm. Heart sounds: Normal heart sounds. No murmur heard. No friction rub. No gallop. Pulmonary:      Effort: Pulmonary effort is normal. No respiratory distress. Breath sounds: Normal breath sounds. Abdominal:      General: Bowel sounds are normal.      Palpations: Abdomen is soft. Tenderness: There is no abdominal tenderness. Musculoskeletal:         General: No tenderness. Normal range of motion. Cervical back: Neck supple. Skin:     General: Skin is warm and dry. Capillary Refill: Capillary refill takes less than 2 seconds. Neurological:      General: No focal deficit present. Mental Status: She is alert and oriented to person, place, and time. Mental status is at baseline. Cranial Nerves: No cranial nerve deficit. Sensory: No sensory deficit. Motor: No weakness or abnormal muscle tone. DIFFERENTIAL  DIAGNOSIS     PLAN (LABS / IMAGING / EKG):  Orders Placed This Encounter   Procedures    CBC with Auto Differential    Comprehensive Metabolic Panel    Phenytoin Level, Total    HCG Qualitative, Serum    Levetiracetam Level    Magnesium    Inpatient consult to Neurology    EKG 12 Lead    Saline lock IV    Insert peripheral IV       MEDICATIONS ORDERED:  Orders Placed This Encounter   Medications    ondansetron (ZOFRAN) injection 4 mg    acetaminophen (TYLENOL) tablet 1,000 mg    0.9 % sodium chloride bolus    levETIRAcetam (KEPPRA) 1500 mg/100 mL IVPB       DDX: breakthrough seizure 2/2 subtherapeutic medication level    Initial MDM/Plan: 32 y.o. female who presents with concerns for breakthrough seizure. At time of initial examination patient was in no acute distress, slightly tachycardic to 105 but otherwise vital signs stable. Patient not actively seizing. Complains of some nausea, headache and generally feeling unwell at this time. Per patient, had a history of heavy alcohol use last night. States she is compliant with her seizure medications at this time. We will plan on drawing basic labs, Keppra and phenytoin levels, EKG, will give some fluids, symptomatic treatment. Will reassess. DIAGNOSTIC RESULTS / EMERGENCY DEPARTMENT COURSE / MDM     LABS:  Labs Reviewed   COMPREHENSIVE METABOLIC PANEL - Abnormal; Notable for the following components:       Result Value    CREATININE 0.99 (*)     CO2 11 (*)     Anion Gap 29 (*)     Alkaline Phosphatase 121 (*)     Total Bilirubin 0.25 (*)     All other components within normal limits   PHENYTOIN LEVEL, TOTAL - Abnormal; Notable for the following components:    Phenytoin Lvl <0.8 (*)     All other components within normal limits   HCG, SERUM, QUALITATIVE   LEVETIRACETAM LEVEL   MAGNESIUM   CBC WITH AUTO DIFFERENTIAL         RADIOLOGY:  No results found.     EKG  EKG Interpretation    Interpreted by me    Rhythm: normal sinus   Rate: normal  Axis: normal  Ectopy: none  Conduction: normal  ST Segments: no acute change  T Waves: inversion in v3  Q Waves: none    Clinical Impression: nonspecific EKG    All EKG's are interpreted by the Emergency Department Physician who either signs or Co-signs this chart in the absence of a cardiologist.    EMERGENCY DEPARTMENT COURSE:  ED Course as of 06/18/22 1810   Sat Jun 18, 2022   1751 Levetiracetam: <2 [JS]   1751 Phenytoin Lvl(!): <0.8 [JS]   3847 Patient is more awake at bedside at this time. On discussion she states that she has been taking her Keppra as prescribed, 1500 mg twice daily. She also says she is not currently on Dilantin but is on Vimpat, unsure of her dose at this time. [JS]   3877 Patient signed out to Dr. Clyde Bruce. [JS]      ED Course User Index  [JS] Waldemar Jimenez DO     PROCEDURES:  None    CONSULTS:  IP CONSULT TO NEUROLOGY    CRITICAL CARE:  Please see attending note    FINAL IMPRESSION      1. Breakthrough seizure ([address]       DISPOSITION / PLAN     DISPOSITION  Pending, likely discharge. PATIENTREFERRED TO:  No follow-up provider specified.     DISCHARGE MEDICATIONS:  New Prescriptions    No medications on file       Jannie Nowak DO  EmergencyMedicine Resident    (Please note that portions of this note were completed with a voice recognition program.  Efforts were made to edit the dictations but occasionally words are mis-transcribed.)     Waldemar Jimenez DO  Resident  06/18/22 1810

## 2022-06-18 NOTE — ED PROVIDER NOTES
[address] [facility]  Emergency Department  Emergency Medicine Resident Sign-out     Care of Jeremy Pollock was assumed from Dr. Kendrick Ramirez and is being seen for Seizures (3 PTA)  . The patient's initial evaluation and plan have been discussed with the prior provider who initially evaluated the patient. EMERGENCY DEPARTMENT COURSE / MEDICAL DECISION MAKING:       MEDICATIONS GIVEN:  Orders Placed This Encounter   Medications    ondansetron (ZOFRAN) injection 4 mg    acetaminophen (TYLENOL) tablet 1,000 mg    0.9 % sodium chloride bolus    levETIRAcetam (KEPPRA) 1500 mg/100 mL IVPB       LABS / RADIOLOGY:     Labs Reviewed   COMPREHENSIVE METABOLIC PANEL - Abnormal; Notable for the following components:       Result Value    CREATININE 0.99 (*)     CO2 11 (*)     Anion Gap 29 (*)     Alkaline Phosphatase 121 (*)     Total Bilirubin 0.25 (*)     All other components within normal limits   PHENYTOIN LEVEL, TOTAL - Abnormal; Notable for the following components:    Phenytoin Lvl <0.8 (*)     All other components within normal limits   HCG, SERUM, QUALITATIVE   LEVETIRACETAM LEVEL   MAGNESIUM   CBC WITH AUTO DIFFERENTIAL       No results found. RECENT VITALS:     Temp: 97.5 °F (36.4 °C),  Heart Rate: (!) 105, Resp: 22, BP: 102/64, SpO2: 99 %    This patient is a 32 y.o. Female with history of seizures presents with breakthrough seizure. Patient was out drinking last night, woke up this morning feeling unwell, had 3 approximately 10-minute long seizures. Received 2 mg of Versed in route via EMS. Patient is on Keppra and Vimpat at home. States she is compliant with medications. Keppra level is subtherapeutic at this time. Patient was loaded with 1.5 g Keppra. Patient is unsure of her Vimpat dosage. Follows with neurology at [facility]. Awaiting final recommendations from neurology consult on this time. Likely stable discharge home.     Neurology recommended discharge home, follow-up with neurology clinic for further management of her seizures. Patient remained stable in the emergency room with stable vital signs. Gave strict return precautions to the emergency department and discharge patient home. OUTSTANDING TASKS / RECOMMENDATIONS:    1. Neurology evaluation  2. Disposition     FINAL IMPRESSION:     1. Breakthrough seizure ([address]        DISPOSITION:         DISPOSITION:  [x]  Discharge   []  Transfer -    []  Admission -     []  Against Medical Advice   []  Eloped   FOLLOW-UP: No follow-up provider specified.    DISCHARGE MEDICATIONS: New Prescriptions    No medications on file           Dawood Mijares MD  Emergency Medicine Resident  [address] [facility]        Dawood Mijares MD  Resident  06/19/22 [id number]

## 2022-06-18 NOTE — ED TRIAGE NOTES
Patient arrives via EMS to room 17. She PTA per EMS 3 seizures today. Mom called EMS. Per mom she takes her medication as prescribed. And has not had a seizure in a while. She smokes marijuana daily. Patient states each seizure lasted about ten minutes. Mom states patient went out drinking last ngiht and believes this is why she had her seizures today. En route vitals were stable BS was 73. RR even and non labored. Patient is vomiting.  Will cont with care

## 2022-06-18 NOTE — ED NOTES
Pt. Discharge instructions reviewed. Pt. Has no further questions at this time.      Madison Nesbitt, RN  06/18/22 1945

## 2022-06-18 NOTE — ED PROVIDER NOTES
[facility]  Emergency Department  Faculty Attestation     I performed a history and physical examination of the patient and discussed management with the resident. I reviewed the residents note and agree with the documented findings and plan of care. Any areas of disagreement are noted on the chart. I was personally present for the key portions of any procedures. I have documented in the chart those procedures where I was not present during the key portions. I have reviewed the emergency nurses triage note. I agree with the chief complaint, past medical history, past surgical history, allergies, medications, social and family history as documented unless otherwise noted below. For Physician Assistant/ Nurse Practitioner cases/documentation I have personally evaluated this patient and have completed at least one if not all key elements of the E/M (history, physical exam, and MDM). Additional findings are as noted. Primary Care Physician:  Dasha Quevedo MD    Screenings:  [unfilled]    CHIEF COMPLAINT       Chief Complaint   Patient presents with    Seizures     3 PTA       RECENT VITALS:   Temp: 97.5 °F (36.4 °C),  Heart Rate: (!) 105, Resp: 22, BP: 130/75    LABS:  Labs Reviewed - No data to display    Radiology  No orders to display     EKG Interpretation    Interpreted by me    Rhythm: normal sinus   Rate: normal  Axis: normal  Ectopy: none  Conduction: normal  ST Segments: no acute change  T Waves: inversion V3  Q Waves: none    Clinical Impression: nonspecificT wave changes    Attending Physician Additional  Notes    Had 3 witnessed seizures today, generalized tonic-clonic. She states she is compliant with medications which are Keppra and another one that is in place of Dilantin. She was drinking last night and felt dehydrated this morning. No strokelike symptoms following these. No injury.   On exam she is tachycardic afebrile vital signs normal.  She alert and oriented. Normal speech and mentation. Impression obvious seizure disorder, breakthrough seizure. Plan is laboratory studies, IV Keppra load, reassess. Angie Blanchard MD, [address] [facility]  Attending Emergency  Physician               Marya Link MD  06/18/22 Abraham Walker MD  06/18/22 [id number]

## 2022-06-18 NOTE — CONSULTS
[facility] Neurology   [address] [facility]    Neurology Consult Note            Date:   6/18/2022  Patient name:  Jozef Whaley  Date of admission:  6/18/2022  3:45 PM  MRN:   [id number]  Account:  [de-identified]  YOB: 1995  PCP:    Good Mtz MD  Room:   17/17  Code Status:    Prior    Chief Complaint:     Chief Complaint   Patient presents with    Seizures     3 PTA       History Obtained From:     patient    History of Present Illness:     32year old female with pmhx of seizures (absence and GTC starting 10 years ago on keppra and vimpat), anxiety, depression presented with complaint of 4-5 seizure like episodes. As per patient she has been moving since last 2 days and has been drinking alcohol. She is usually compliant with her medications but has been unable to take her keppra medication since last 2 days although she was able to take vim pat. She woke up today and then sudenly she became confused and doesn't recall what happened next but after waking up she was feeling tired and confused. Her family told she had GTC seizures. She denies any tongue bite or urinary or fecal incontinence. As per patient she gets absence seizures every month around the time of periods and was recently switched from dilantin to vim pat by her new neurologist at [facility]. She never had GTC seizures since 2019 although she does acknowledge that she didn take her keppra in last two days. She denies any flashing of lights or sleep deprivation or stressed or substance abuse. Denies any fever or urinary discomfort or discharge. Keppra levels <2. On exam she was AAO x 3. No cranial nerve deficit noticed. No motor or sensory weakness noticed. Pt had mild swelling over the left eye lid but no redness or erythema noticed she was complaining of mild discomfort. Also complained of severe headache on arrival after getting tylenol it resolved.      Pt was loaded with 1.5g of IV keppra. In the context of noncompliance of keppra medication possible breakthrough seizures can be explained from it. No other provoking factor was identified. Patient was counseled about the need for compliance along with seizures precautions and f/u outpatient with the primary neurologist.       Past Medical History:     Past Medical History:   Diagnosis Date    Anxiety     Asthma 7/12/2019    Depression 2/25/2022    Seizures ([address]         Past Surgical History:     Past Surgical History:   Procedure Laterality Date    BRAIN SURGERY          Medications Prior to Admission:     Prior to Admission medications    Medication Sig Start Date End Date Taking?  Authorizing Provider   metroNIDAZOLE (FLAGYL) 500 MG tablet take 1 tablet by mouth twice a day for 5 days 12/23/21   Historical Provider, MD   fluconazole (DIFLUCAN) 150 MG tablet take 1 tablet by mouth AS A ONE TIME DOSE 12/23/21   Historical Provider, MD   norgestimate-ethinyl estradiol (ORTHO-CYCLEN) 0.25-35 MG-MCG per tablet take 1 tablet by mouth once daily 12/22/21   Historical Provider, MD   albuterol sulfate HFA (VENTOLIN HFA) 108 (90 Base) MCG/ACT inhaler Inhale 2 puffs into the lungs every 6 hours as needed for Wheezing 2/25/22   Don Kent MD   levETIRAcetam (KEPPRA) 750 MG tablet Take 2 tablets by mouth 2 times daily 2/25/22 5/26/22  Don Kent MD   folic acid (FOLVITE) 1 MG tablet Take 1 tablet by mouth daily 2/25/22   Don Kent MD   sertraline (ZOLOFT) 100 MG tablet Take 1 tablet by mouth daily 2/25/22   Don Kent MD   phenytoin (DILANTIN) 100 MG ER capsule Take 1 capsule by mouth 3 times daily 2/25/22   Don Kent MD   nicotine (NICODERM CQ) 7 MG/24HR Place 1 patch onto the skin every 24 hours 11/19/20 11/19/21  Abimael Thomason MD   nicotine polacrilex (NICORETTE) 2 MG gum Take 1 each by mouth as needed for Smoking cessation 11/19/20   Abimael Thomason MD   Prenatal Vit-Fe Fumarate-FA (PRENATAL VITAMIN PLUS LOW IRON) memory, no confusion, normal speech, normal language, no hallucination or delusion   CRANIAL NERVES: II     -      Visual fields intact to confrontation  III,IV,VI -  PERR, EOMs full, no ptosis mild swelling over the right eye lid no erthema or discharge or conjuctivitis noticed.    V     -     Normal facial sensation   VII    -     Normal facial symmetry  VIII   -     Intact hearing   IX,X -     Symmetrical palate  XI    -     Symmetrical shoulder shrug  XII   -     Midline tongue, no atrophy    MOTOR FUNCTION: RUE: Significant for good strength of grade 5/5 in proximal and distal muscle groups   LUE: Significant for good strength of grade 5/5 in proximal and distal muscle groups   RLE: Significant for good strength of grade 5/5 in proximal and distal muscle groups   LLE: Significant for good strength of grade 5/5 in proximal and distal muscle groups      Normal bulk, normal tone and no involuntary movements, no tremor   SENSORY FUNCTION:  Normal touch, normal pinprick, normal vibration, normal proprioception   CEREBELLAR FUNCTION:  Intact fine motor control over upper limbs and lower limbs   REFLEX FUNCTION:  Symmetric in upper and lower extremities, no Babinski sign   STATION and GAIT  Normal gait and tandem station, normal tip toes and heel walking       Investigations:      Laboratory Testing:  Recent Results (from the past 24 hour(s))   CBC with Auto Differential    Collection Time: 06/18/22  4:08 PM   Result Value Ref Range    WBC 12.6 (H) 3.5 - 11.3 k/uL    RBC 4.32 3.95 - 5.11 m/uL    Hemoglobin 14.2 11.9 - 15.1 g/dL    Hematocrit 44.2 36.3 - 47.1 %    .3 82.6 - 102.9 fL    MCH 32.9 25.2 - 33.5 pg    MCHC 32.1 28.4 - 34.8 g/dL    RDW 12.6 11.8 - 14.4 %    Platelets 751 589 - 718 k/uL    MPV 11.6 8.1 - 13.5 fL    NRBC Automated 0.0 0.0 per 100 WBC    Immature Granulocytes 0 0 %    Seg Neutrophils 46 36 - 66 %    Lymphocytes 45 (H) 24 - 44 %    Atypical Lymphocytes 2 %    Monocytes 5 1 - 7 % Eosinophils % 2 1 - 4 %    Basophils 0 0 - 2 %    Absolute Immature Granulocyte 0.00 0.00 - 0.30 k/uL    Segs Absolute 5.80 1.8 - 7.7 k/uL    Absolute Lymph # 5.67 (H) 1.0 - 4.8 k/uL    Atypical Lymphocytes Absolute 0.25 k/uL    Absolute Mono # 0.63 0.1 - 0.8 k/uL    Absolute Eos # 0.25 0.0 - 0.4 k/uL    Basophils Absolute 0.00 0.0 - 0.2 k/uL    Morphology Normal    Comprehensive Metabolic Panel    Collection Time: 06/18/22  4:08 PM   Result Value Ref Range    Glucose 96 70 - 99 mg/dL    BUN 8 6 - 20 mg/dL    CREATININE 0.99 (H) 0.50 - 0.90 mg/dL    Calcium 8.8 8.6 - 10.4 mg/dL    Sodium 140 135 - 144 mmol/L    Potassium 3.7 3.7 - 5.3 mmol/L    Chloride 100 98 - 107 mmol/L    CO2 11 (L) 20 - 31 mmol/L    Anion Gap 29 (H) 9 - 17 mmol/L    Alkaline Phosphatase 121 (H) 35 - 104 U/L    ALT 29 5 - 33 U/L    AST 27 <32 U/L    Total Bilirubin 0.25 (L) 0.3 - 1.2 mg/dL    Total Protein 7.6 6.4 - 8.3 g/dL    Albumin 4.6 3.5 - 5.2 g/dL    Albumin/Globulin Ratio 1.5 1.0 - 2.5    GFR Non-African American >60 >60 mL/min    GFR African American >60 >60 mL/min    GFR Comment         Phenytoin Level, Total    Collection Time: 06/18/22  4:08 PM   Result Value Ref Range    Phenytoin Lvl <0.8 (L) 10.0 - 20.0 ug/mL   HCG Qualitative, Serum    Collection Time: 06/18/22  4:08 PM   Result Value Ref Range    hCG Qual NEGATIVE NEGATIVE   Levetiracetam Level    Collection Time: 06/18/22  4:08 PM   Result Value Ref Range    Levetiracetam Lvl <2 ug/mL   Magnesium    Collection Time: 06/18/22  4:08 PM   Result Value Ref Range    Magnesium 1.8 1.6 - 2.6 mg/dL   EKG 12 Lead    Collection Time: 06/18/22  4:38 PM   Result Value Ref Range    Ventricular Rate 84 BPM    Atrial Rate 84 BPM    P-R Interval 162 ms    QRS Duration 80 ms    Q-T Interval 392 ms    QTc Calculation (Bazett) 463 ms    P Axis 62 degrees    R Axis 61 degrees    T Axis 46 degrees         Assessment :      Primary Problem  <principal problem not specified>    There are no active hospital problems to display for this patient. 32year old female with pmhx of seizures (absence and GTC starting 10 years ago on keppra and vimpat), anxiety, depression presented with complaint of 4-5 seizure like episodes. Hx of epileptic brain surgery in 2015. Non compliance with seizures medication. Levels subtherapeutic. Loaded with keppra. Breakthrough seizures secondary from keppra noncompliance    Plan:     · Keppra levels <2.  · Loaded with keppra 1.5g IV in the ED. · Patient has ample supply of AEDs at home. · Phyentoin levels <0.8 (pt has been switched to vimpat and in the past was taking dilantin)  · No driving for at least 6 months. · No heavy lifting for at least 6 months  · No bathing or swimming unsupervised  · Avoid locking doors, it prevents some to help you if needed  · Avoid stairs unsupervised  · Avoid cooking unsupervised      In the context of noncompliance of keppra medication possible breakthrough seizures can be explained from it. No other provoking factor was identified. Patient was counseled about the need for compliance along with seizures precuations and f/u outpatient with the primary neurologist.       Consultations:   IP CONSULT TO NEUROLOGY      Follow-up further recommendations after discussing the case with attending  The plan was discussed with the patient, patient's family and the medical staff. Patient is admitted as inpatient status because of co-morbidities listed above, severity of signs and symptoms as outlined, requirement for current medical therapies and most importantly because of direct risk to patient if care not provided in a hospital setting.     Rita Flores MD  6/18/2022  7:50 PM    Copy sent to Dr. Loris Dancer, MD

## 2022-06-18 NOTE — ED NOTES
Pt. Requesting dsicharge paperwork and IV removed. Dr. Varma Hence unavailable at this time for discharge paperwork and pt updated on that.      Sergio Gitelman, RN  06/18/22 [id number]

## 2022-06-20 ENCOUNTER — TELEMEDICINE (OUTPATIENT)
Dept: FAMILY MEDICINE CLINIC | Age: 27
End: 2022-06-20
Payer: MEDICARE

## 2022-06-20 DIAGNOSIS — Z78.9 ALCOHOL USE: ICD-10-CM

## 2022-06-20 DIAGNOSIS — H00.019 HORDEOLUM EXTERNUM, UNSPECIFIED LATERALITY: ICD-10-CM

## 2022-06-20 DIAGNOSIS — G40.909 SEIZURE DISORDER (HCC): Primary | ICD-10-CM

## 2022-06-20 DIAGNOSIS — K21.9 GASTROESOPHAGEAL REFLUX DISEASE WITHOUT ESOPHAGITIS: ICD-10-CM

## 2022-06-20 PROBLEM — F10.90 ALCOHOL USE: Status: ACTIVE | Noted: 2022-06-20

## 2022-06-20 LAB
EKG ATRIAL RATE: 84 BPM
EKG P AXIS: 62 DEGREES
EKG P-R INTERVAL: 162 MS
EKG Q-T INTERVAL: 392 MS
EKG QRS DURATION: 80 MS
EKG QTC CALCULATION (BAZETT): 463 MS
EKG R AXIS: 61 DEGREES
EKG T AXIS: 46 DEGREES
EKG VENTRICULAR RATE: 84 BPM

## 2022-06-20 PROCEDURE — 99214 OFFICE O/P EST MOD 30 MIN: CPT | Performed by: STUDENT IN AN ORGANIZED HEALTH CARE EDUCATION/TRAINING PROGRAM

## 2022-06-20 PROCEDURE — 93010 ELECTROCARDIOGRAM REPORT: CPT | Performed by: INTERNAL MEDICINE

## 2022-06-20 RX ORDER — OMEPRAZOLE 20 MG/1
20 CAPSULE, DELAYED RELEASE ORAL
Qty: 60 CAPSULE | Refills: 0 | Status: SHIPPED | OUTPATIENT
Start: 2022-06-20

## 2022-06-20 RX ORDER — POLYMYXIN B SULFATE AND TRIMETHOPRIM 1; 10000 MG/ML; [USP'U]/ML
1 SOLUTION OPHTHALMIC EVERY 4 HOURS
Qty: 1 EACH | Refills: 0 | Status: SHIPPED | OUTPATIENT
Start: 2022-06-20 | End: 2022-06-30

## 2022-06-20 ASSESSMENT — PATIENT HEALTH QUESTIONNAIRE - PHQ9
SUM OF ALL RESPONSES TO PHQ QUESTIONS 1-9: 0
2. FEELING DOWN, DEPRESSED OR HOPELESS: 0
SUM OF ALL RESPONSES TO PHQ QUESTIONS 1-9: 0
SUM OF ALL RESPONSES TO PHQ9 QUESTIONS 1 & 2: 0
SUM OF ALL RESPONSES TO PHQ QUESTIONS 1-9: 0
SUM OF ALL RESPONSES TO PHQ QUESTIONS 1-9: 0
1. LITTLE INTEREST OR PLEASURE IN DOING THINGS: 0

## 2022-06-20 NOTE — PROGRESS NOTES
Attending Physician Statement  I have discussed the care of Darell Guillory, 32 y.o. female,including pertinent history and exam findings,  with the resident Dr. Prabhjot Christie MD.  History:  Chief Complaint   Patient presents with   [facility]     for about a week    Alcohol Problem     wanting help to stop drinking, check up     Patient underwent a phone visit for follow up on ED visit for breakthrough seizure and alcohol abuse. I have reviewed the key elements of the encounter with the resident. Examination was done by resident as documented in residents note. BP Readings from Last 3 Encounters:   06/18/22 122/81   04/12/22 (!) 122/95   01/20/22 120/82     There were no vitals taken for this visit. Lab Results   Component Value Date    WBC 12.6 (H) 06/18/2022    HGB 14.2 06/18/2022    HCT 44.2 06/18/2022     06/18/2022    ALT 29 06/18/2022    AST 27 06/18/2022     06/18/2022    K 3.7 06/18/2022     06/18/2022    CREATININE 0.99 (H) 06/18/2022    BUN 8 06/18/2022    CO2 11 (L) 06/18/2022     Lab Results   Component Value Date    CALCIUM 8.8 06/18/2022     No results found for: LDLCALC, LDLCHOLESTEROL, LDLDIRECT  I agree with the assessment, plan and diagnosis of    Diagnosis Orders   1. Seizure disorder ([facility] [address]     2. Alcohol use  [facility] Referral for Social Determinants of Health   3. Hordeolum externum, unspecified laterality  trimethoprim-polymyxin b (POLYTRIM) 78067-5.1 UNIT/ML-% ophthalmic solution   4. Gastroesophageal reflux disease without esophagitis  omeprazole (PRILOSEC) 20 MG delayed release capsule     I agree with  orders as documented by the resident. Recommendations:   Patient was counseled, advised to adhere to current regimen and recheck Keppra levels in the future. Counseled to establish with [employer] for further outpatient alcohol rehab. Treatment of stye discussed. Office visit recommended. No follow-ups on file.    (Jorge Bell ) Dr. Watson Whiting MD

## 2022-06-20 NOTE — PROGRESS NOTES
Zully Mireles is a 32 y.o. female evaluated via telephone on 6/20/2022 for Stye (for about a week) and Alcohol Problem (wanting help to stop drinking, check up)  . Documentation:  I communicated with the patient and/or health care decision maker about ED follow up. Details of this discussion including any medical advice provided:   32year old female with PMHx of seizure disorder presents for ED follow up. She was seen in the ED for a breakthrough seizure yesterday, Keppra level was checked and was subtherapeutic. She was drinking the night before and forgot to take her [place]. She drinks alcohol everyday, a pint of liquor daily. Sty on left eye. Given patient instruction for using warm compressors and eyedrops pescribed. Patient reports having alcohol use disorder needs some resources to help her quit. 1. Seizure disorder ([address]  Had a breakthrough seizure, on keprra 1500 BID,  Discussed with patient to follow up with neurology. Counseled patient heavily on medication compliance. Counseled patient regarding alcohol use. 2. Alcohol use  Counseled patient on importance of quiting. Provided patient with resources for GELI [phone]  Discussed with patient that she will be contacted by nurse from social determinants to follow up on alcohol use. Counseled patient regarding folic acid and thiamine use. - [facility] Referral for Social Determinants of Health    3. Hordeolum externum, unspecified laterality    - trimethoprim-polymyxin b (POLYTRIM) 97110-3.1 UNIT/ML-% ophthalmic solution; Place 1 drop into both eyes every 4 hours for 10 days  Dispense: 1 each; Refill: 0    4. Gastroesophageal reflux disease without esophagitis    - omeprazole (PRILOSEC) 20 MG delayed release capsule; Take 1 capsule by mouth 2 times daily (before meals)  Dispense: 60 capsule; Refill: 0        Total Time: minutes: 21-30 minutes    Zully Mireles was evaluated through a synchronous (real-time) audio encounter. Patient identification was verified at the start of the visit. She (or guardian if applicable) is aware that this is a billable service, which includes applicable co-pays. This visit was conducted with the patient's (and/or legal guardian's) verbal consent. She has not had a related appointment within my department in the past 7 days or scheduled within the next 24 hours. The patient was located at Home: [place] [address] [address] [place] [address]. The provider was located at [facility] (Appt Dept): [address] [place] [address]     Note: not billable if this call serves to triage the patient into an appointment for the relevant concern    Yohana Hoyos MD

## 2022-06-20 NOTE — PATIENT INSTRUCTIONS
Thank you for letting us take care of you today. We hope all your questions were addressed. If a question was overlooked or something else comes to mind after you return home, please contact a member of your Care Team listed below. Your Care Team at [place] [address] is Team #2  Tami Mcpherson DO (Faculty)  Minnie Malin (Faculty)  Sid Cabral MD (Resident)  Eben Moon MD (Resident)  Josi Reeder MD (Resident)  Lucía Tovar MD (Resident)  Rehana Becker., JASON Soni.,  LISA Valles., HARRIET Sarabia., [facility] office)  Dieudonne Kyle, [facility] (Clinical Practice Manager)  Eric Joel, [address] [place] [address] (Clinical Pharmacist)     Office phone number: [phone]    If you need to get in right away due to illness, please be advised we have \"Same Day\" appointments available Monday-Friday. Please call us at [phone] option #3 to schedule your \"Same Day\" appointment.

## 2022-06-20 NOTE — PROGRESS NOTES
Visit Information    Have you changed or started any medications since your last visit including any over-the-counter medicines, vitamins, or herbal medicines? no   Are you having any side effects from any of your medications? -  no  Have you stopped taking any of your medications? Is so, why? -  no    Have you seen any other physician or provider since your last visit? No  Have you had any other diagnostic tests since your last visit? No  Have you been seen in the emergency room and/or had an admission to a hospital since we last saw you? No  Have you had your routine dental cleaning in the past 6 months? no    Have you activated your Eyew[employer] Surveillance account? If not, what are your barriers?  Yes     Patient Care Team:  Flaquito Dacosta MD as PCP - General (Family Medicine)    Medical History Review  Past Medical, Family, and Social History reviewed and does not contribute to the patient presenting condition    Health Maintenance   Topic Date Due    Varicella vaccine (1 of 2 - 2-dose childhood series) Never done    COVID-19 Vaccine (1) Never done    Pneumococcal 0-64 years Vaccine (1 - PCV) Never done    Pap smear  Never done    Flu vaccine (Season Ended) 09/01/2022    Depression Monitoring  02/25/2023    DTaP/Tdap/Td vaccine (2 - Td or Tdap) 08/17/2027    Hepatitis C screen  Completed    HIV screen  Completed    Hepatitis A vaccine  Aged Out    Hepatitis B vaccine  Aged Out    Hib vaccine  Aged Out    Meningococcal (ACWY) vaccine  Aged Out

## 2022-06-21 ENCOUNTER — TELEPHONE (OUTPATIENT)
Dept: FAMILY MEDICINE CLINIC | Age: 27
End: 2022-06-21

## 2022-06-21 NOTE — TELEPHONE ENCOUNTER
Keenan Del Rosario was contacted  by writer to discuss referral for SDOH related needs. Writer spoke with: Patient and explained the services and assistance that can be provided through the patient navigation program.     Patient agreeable to receiving resources and support from [address] [place] [address]. Discussed the following with the patient:      Needs and background info: Alcohol addiction. Plan of Care: Patient states they will call [place] in the morning. Follow up with patient necessary: yes    Follow up with resource organization necessary: yes    Patient has given verbal permission to leave detailed messages regarding SDOH referral on their phone. N/A    Patient has given verbal permission to submit applications on their behalf. N/A    Patient was provided with writer's contact information should they require any additional assistance.        Jami Montes MA

## 2022-06-27 ENCOUNTER — TELEPHONE (OUTPATIENT)
Dept: FAMILY MEDICINE CLINIC | Age: 27
End: 2022-06-27

## 2022-06-27 NOTE — TELEPHONE ENCOUNTER
Zully Mireles was contacted by Giovanna Stevens MA, vince Infante, regarding a Social Determinants of Health referral.     A message was left with the writer's contact information.     follow up phone call

## 2022-06-30 ENCOUNTER — TELEPHONE (OUTPATIENT)
Dept: FAMILY MEDICINE CLINIC | Age: 27
End: 2022-06-30

## 2022-06-30 NOTE — TELEPHONE ENCOUNTER
Marquita Cruz was contacted  by writer to discuss referral for SDOH related needs. Writer spoke with: Patient and explained the services and assistance that can be provided through the patient navigation program.     Patient not agreeable to receiving resources and support from [employer]. Discussed the following with the patient:      Needs and background info: Patient has not called [place] as advised by provider. Plan of Care: Patient is working again. Patient states she does not need any assistance at this time. Follow up with patient necessary: no    Follow up with resource organization necessary: no    Patient has given verbal permission to leave detailed messages regarding SDOH referral on their phone. N/A    Patient has given verbal permission to submit applications on their behalf. N/A    Patient was provided with writer's contact information should they require any additional assistance.        Dede Reece MA

## 2022-07-23 ENCOUNTER — HOSPITAL ENCOUNTER (EMERGENCY)
Age: 27
Discharge: HOME | End: 2022-07-23
Attending: EMERGENCY MEDICINE
Payer: MEDICARE

## 2022-07-23 VITALS
RESPIRATION RATE: 20 BRPM | HEART RATE: 89 BPM | TEMPERATURE: 98.5 F | OXYGEN SATURATION: 99 % | SYSTOLIC BLOOD PRESSURE: 113 MMHG | DIASTOLIC BLOOD PRESSURE: 76 MMHG

## 2022-07-23 DIAGNOSIS — N30.00 ACUTE CYSTITIS WITHOUT HEMATURIA: Primary | ICD-10-CM

## 2022-07-23 LAB
-: NORMAL
BILIRUBIN URINE: NEGATIVE
CANDIDA SPECIES, DNA PROBE: NEGATIVE
CASTS UA: NORMAL /LPF (ref 0–8)
COLOR: YELLOW
EPITHELIAL CELLS UA: NORMAL /HPF (ref 0–5)
GARDNERELLA VAGINALIS, DNA PROBE: POSITIVE
GLUCOSE URINE: NEGATIVE
KETONES, URINE: NEGATIVE
LEUKOCYTE ESTERASE, URINE: ABNORMAL
NITRITE, URINE: NEGATIVE
PH UA: 6.5 (ref 5–8)
PROTEIN UA: NEGATIVE
RBC UA: NORMAL /HPF (ref 0–4)
SOURCE: ABNORMAL
SPECIFIC GRAVITY UA: 1.01 (ref 1–1.03)
TRICHOMONAS VAGINALIS DNA: NEGATIVE
TURBIDITY: ABNORMAL
URINE HGB: NEGATIVE
UROBILINOGEN, URINE: NORMAL
WBC UA: NORMAL /HPF (ref 0–5)

## 2022-07-23 PROCEDURE — 99283 EMERGENCY DEPT VISIT LOW MDM: CPT

## 2022-07-23 PROCEDURE — 81001 URINALYSIS AUTO W/SCOPE: CPT

## 2022-07-23 PROCEDURE — 87086 URINE CULTURE/COLONY COUNT: CPT

## 2022-07-23 PROCEDURE — 87491 CHLMYD TRACH DNA AMP PROBE: CPT

## 2022-07-23 PROCEDURE — 87660 TRICHOMONAS VAGIN DIR PROBE: CPT

## 2022-07-23 PROCEDURE — 87591 N.GONORRHOEAE DNA AMP PROB: CPT

## 2022-07-23 PROCEDURE — 87480 CANDIDA DNA DIR PROBE: CPT

## 2022-07-23 PROCEDURE — 87510 GARDNER VAG DNA DIR PROBE: CPT

## 2022-07-23 RX ORDER — METRONIDAZOLE 500 MG/1
500 TABLET ORAL 2 TIMES DAILY
Qty: 14 TABLET | Refills: 0 | Status: SHIPPED | OUTPATIENT
Start: 2022-07-23 | End: 2022-07-30

## 2022-07-23 RX ORDER — CEPHALEXIN 500 MG/1
500 CAPSULE ORAL 2 TIMES DAILY
Qty: 14 CAPSULE | Refills: 0 | Status: SHIPPED | OUTPATIENT
Start: 2022-07-23 | End: 2022-07-30

## 2022-07-23 NOTE — ED PROVIDER NOTES
[facility] ED     Emergency Department     Faculty Attestation    I performed a history and physical examination of the patient and discussed management with the resident. I reviewed the residents note and agree with the documented findings and plan of care. Any areas of disagreement are noted on the chart. I was personally present for the key portions of any procedures. I have documented in the chart those procedures where I was not present during the key portions. I have reviewed the emergency nurses triage note. I agree with the chief complaint, past medical history, past surgical history, allergies, medications, social and family history as documented unless otherwise noted below. For Physician Assistant/ Nurse Practitioner cases/documentation I have personally evaluated this patient and have completed at least one if not all key elements of the E/M (history, physical exam, and MDM). Additional findings are as noted. Patient presents with irritation to the vaginal area. She says she does have some mild burning with urination as well. She denies any abnormal discharge. She denies fever, chest pain, shortness of breath, abdominal pain, nausea or vomiting. On exam, patient is resting comfortably in the bed and appears well. The resident will perform a pelvic exam.  Will check pelvic labs and urinalysis.       Nawaf Calles MD  Attending Emergency  Physician            Roger Owens MD  07/23/22 [id number]

## 2022-07-23 NOTE — ED PROVIDER NOTES
[facility]  ED  Emergency Department Encounter  EmergencyMedicine Resident     Pt Delio Regan  MRN: [id number]  [place] 1995  Date of evaluation: 7/23/22  PCP:  Shayy Recio MD    This patient was evaluated in the Emergency Department for symptoms described in the history of present illness. The patient was evaluated in the context of the global COVID-19 pandemic, which necessitated consideration that the patient might be at risk for infection with the SARS-CoV-2 virus that causes COVID-19. Institutional protocols and algorithms that pertain to the evaluation of patients at risk for COVID-19 are in a state of rapid change based on information released by regulatory bodies including the CDC and federal and state organizations. These policies and algorithms were followed during the patient's care in the ED. CHIEF COMPLAINT       Chief Complaint   Patient presents with    Vaginitis     Vaginal irritation, possible yeast infection       HISTORY OF PRESENT ILLNESS  (Location/Symptom, Timing/Onset, Context/Setting, Quality, Duration, Modifying Factors, Severity.)      Saniya Pedro is a 32 y.o. female who presents with vaginal irritation. She states she used a sensitive skin soap on her vulva yesterday and today has been experiencing external and internal irritation. She denies vaginal discharge or bleeding. She denies dyspareunia. She states she is also concerned that she may have a UTI and would like to be tested for STI. She does not have a confirmed positive partner but she is concerned about possible exposure and would like testing. She denies dysuria, hematuria, abdominal pain, fevers, chills, abdominal pain, lesions or sores, rashes, difficulty initiating urinary, frequency, urgency, flank pain. PAST MEDICAL / SURGICAL / SOCIAL / FAMILY HISTORY      has a past medical history of Anxiety, Asthma, Depression, and Seizures ([facility] [address]        has a past surgical history that includes brain surgery. Social History     Socioeconomic History    Marital status: Single     Spouse name: Not on file    Number of children: Not on file    Years of education: Not on file    Highest education level: Not on file   Occupational History    Not on file   Tobacco Use    Smoking status: Every Day     Packs/day: 0.25     Years: 2.00     Pack years: 0.50     Types: Cigarettes    Smokeless tobacco: Never    Tobacco comments:     2-3 per day   Substance and Sexual Activity    Alcohol use: No    Drug use: Yes     Types: Marijuana Estil Catena)     Comment: daily    Sexual activity: Yes     Partners: Male   Other Topics Concern    Not on file   Social History Narrative    Not on file     Social Determinants of Health     Financial Resource Strain: Low Risk     Difficulty of Paying Living Expenses: Not very hard   Food Insecurity: No Food Insecurity    Worried About Running Out of Food in the Last Year: Never true    Ran Out of Food in the Last Year: Never true   Transportation Needs: Not on file   Physical Activity: Not on file   Stress: Not on file   Social Connections: Not on file   Intimate Partner Violence: Not on file   Housing Stability: Not on file       Family History   Problem Relation Age of Onset    Asthma Mother     Depression Mother     High Blood Pressure Mother        Allergies:  Patient has no known allergies. Home Medications:  Prior to Admission medications    Medication Sig Start Date End Date Taking? Authorizing Provider   cephALEXin (KEFLEX) 500 MG capsule Take 1 capsule by mouth in the morning and 1 capsule before bedtime. Do all this for 7 days. 7/23/22 7/30/22 Yes Justine Muniz MD   metroNIDAZOLE (FLAGYL) 500 MG tablet Take 1 tablet by mouth in the morning and 1 tablet before bedtime. Do all this for 7 days.  7/23/22 7/30/22 Yes Justine Muniz MD   omeprazole (PRILOSEC) 20 MG delayed release capsule Take 1 capsule by mouth 2 times daily (before meals) 6/20/22   Nathaly Davenport Ana M Okeefe MD   fluconazole (DIFLUCAN) 150 MG tablet take 1 tablet by mouth AS A ONE TIME DOSE 12/23/21   Historical Provider, MD   norgestimate-ethinyl estradiol (ORTHO-CYCLEN) 0.25-35 MG-MCG per tablet take 1 tablet by mouth once daily 12/22/21   Historical Provider, MD   albuterol sulfate HFA (VENTOLIN HFA) 108 (90 Base) MCG/ACT inhaler Inhale 2 puffs into the lungs every 6 hours as needed for Wheezing 2/25/22   Evert Gooden MD   levETIRAcetam (KEPPRA) 750 MG tablet Take 2 tablets by mouth 2 times daily 2/25/22 5/26/22  Evert Gooden MD   folic acid (FOLVITE) 1 MG tablet Take 1 tablet by mouth daily 2/25/22   Evert Gooden MD   sertraline (ZOLOFT) 100 MG tablet Take 1 tablet by mouth daily 2/25/22   Evert Gooden MD   phenytoin (DILANTIN) 100 MG ER capsule Take 1 capsule by mouth 3 times daily 2/25/22   Evert Gooden MD   nicotine (NICODERM CQ) 7 MG/24HR Place 1 patch onto the skin every 24 hours 11/19/20 11/19/21  Luis Fernando Lopes MD   nicotine polacrilex (NICORETTE) 2 MG gum Take 1 each by mouth as needed for Smoking cessation 11/19/20   Luis Fernando Lopes MD   Prenatal Vit-Fe Fumarate-FA (PRENATAL VITAMIN PLUS LOW IRON) 27-1 MG TABS Take 1 tablet by mouth daily 11/19/20   Luis Fernando Lopes MD   cetirizine (ZYRTEC ALLERGY) 10 MG tablet Take 1 tablet by mouth daily 7/12/19   Abhay Owen MD       REVIEW OF SYSTEMS    (2-9 systems for level 4, 10 or more for level 5)      Review of Systems   Constitutional:  Negative for activity change, appetite change, chills, fatigue and fever. HENT:  Negative for congestion, rhinorrhea and sore throat. Eyes:  Negative for visual disturbance. Respiratory:  Negative for cough and shortness of breath. Cardiovascular:  Negative for chest pain. Gastrointestinal:  Negative for abdominal pain, diarrhea, nausea and vomiting. Genitourinary:  Negative for dysuria, flank pain, frequency, hematuria, urgency, vaginal bleeding and vaginal discharge.         Vulvar irritation   Musculoskeletal:  Negative for arthralgias and myalgias. Skin:  Negative for pallor and rash. Neurological:  Negative for dizziness, tremors, syncope, weakness, light-headedness, numbness and headaches. All other systems reviewed and are negative. PHYSICAL EXAM   (up to 7 for level 4, 8 or more for level 5)      INITIAL VITALS:   /76   Pulse 89   Temp 98.5 °F (36.9 °C) (Oral)   Resp 20   SpO2 99%     Physical Exam  Vitals reviewed. Constitutional:       General: She is not in acute distress. Appearance: She is not toxic-appearing. HENT:      Head: Normocephalic and atraumatic. Mouth/Throat:      Mouth: Mucous membranes are moist.      Pharynx: Oropharynx is clear. Eyes:      Extraocular Movements: Extraocular movements intact. Pupils: Pupils are equal, round, and reactive to light. Cardiovascular:      Rate and Rhythm: Normal rate and regular rhythm. Pulses: Normal pulses. Heart sounds: Normal heart sounds. Pulmonary:      Effort: Pulmonary effort is normal.      Breath sounds: Normal breath sounds. No decreased breath sounds, wheezing, rhonchi or rales. Abdominal:      Palpations: Abdomen is soft. Tenderness: There is no abdominal tenderness. There is no guarding or rebound. Genitourinary:     General: Normal vulva. Vagina: Vaginal discharge present. Musculoskeletal:         General: Normal range of motion. Cervical back: Normal range of motion and neck supple. Right lower leg: No edema. Left lower leg: No edema. Skin:     Capillary Refill: Capillary refill takes less than 2 seconds. Coloration: Skin is not pale. Findings: No rash. Neurological:      General: No focal deficit present. Mental Status: She is alert and oriented to person, place, and time. Motor: No weakness.        DIFFERENTIAL  DIAGNOSIS     PLAN (LABS / IMAGING / EKG):  Orders Placed This Encounter   Procedures    Vaginitis DNA Probe    C.trachomatis N.gonorrhoeae DNA    Culture, Urine    Urinalysis with Reflex to Culture    Microscopic Urinalysis       MEDICATIONS ORDERED:  Orders Placed This Encounter   Medications    cephALEXin (KEFLEX) 500 MG capsule     Sig: Take 1 capsule by mouth in the morning and 1 capsule before bedtime. Do all this for 7 days. Dispense:  14 capsule     Refill:  0    metroNIDAZOLE (FLAGYL) 500 MG tablet     Sig: Take 1 tablet by mouth in the morning and 1 tablet before bedtime. Do all this for 7 days. Dispense:  14 tablet     Refill:  0       DDX: Vaginal d/c + suprapubic pain: pregnancy, vaginitis (trichomonas, candidiasis, bacterial vaginosis), UTI, chlamydia/gonorrhea, atropic vaginitis, foreign body, HIV, trauma, primary dermatologic disorders, bartholin's cyst/abscess, chancroid, HSV, HPV condylomas    DIAGNOSTIC RESULTS / EMERGENCY DEPARTMENT COURSE / MDM   LAB RESULTS:  Results for orders placed or performed during the hospital encounter of 07/23/22   Vaginitis DNA Probe    Specimen: Vaginal   Result Value Ref Range    Source . VAGINAL SWAB     Trichomonas Vaginalis DNA NEGATIVE NEGATIVE    GARDNERELLA VAGINALIS, DNA PROBE POSITIVE (A) NEGATIVE    CANDIDA SPECIES, DNA PROBE NEGATIVE NEGATIVE   C.trachomatis N.gonorrhoeae DNA    Specimen: Genital Swab   Result Value Ref Range    Specimen Description . GENITAL SWAB     C. trachomatis DNA NEGATIVE NEGATIVE    N. gonorrhoeae DNA NEGATIVE NEGATIVE   Culture, Urine    Specimen: Urine   Result Value Ref Range    Specimen Description . URINE     Culture NO SIGNIFICANT GROWTH    Urinalysis with Reflex to Culture    Specimen: Urine   Result Value Ref Range    Color, UA Yellow Yellow    Turbidity UA Cloudy (A) Clear    Glucose, Ur NEGATIVE NEGATIVE    Bilirubin Urine NEGATIVE NEGATIVE    Ketones, Urine NEGATIVE NEGATIVE    Specific Gravity, UA 1.015 1.005 - 1.030    Urine Hgb NEGATIVE NEGATIVE    pH, UA 6.5 5.0 - 8.0    Protein, UA NEGATIVE NEGATIVE Urobilinogen, Urine Normal Normal    Nitrite, Urine NEGATIVE NEGATIVE    Leukocyte Esterase, Urine SMALL (A) NEGATIVE   Microscopic Urinalysis   Result Value Ref Range    -          WBC, UA 20 TO 50 0 - 5 /HPF    RBC, UA None 0 - 4 /HPF    Casts UA  0 - 8 /LPF     2 TO 5 HYALINE Reference range defined for non-centrifuged specimen. Epithelial Cells UA 10 TO 20 0 - 5 /HPF       IMPRESSION: 31 yo F presents with vulvar irritation after using a new soap. She requests testing for UTI and STI. Vitals stable. Pelvic exam completed at bedside, small amount of thin gray vaginal discharge on exam. Os closed. No sores or lesions. Vulva normal. Patient positive for BV and UTI, given antibiotics. Discussed follow up with PCP and return precautions. Patient voiced understanding. PROCEDURES:  Pelvic exam procedure:     Patient provided verbal consent to perform Pelvic exam. Nursing staff assisted and chaperoned. EXAM:  Vagina:   No cottage cheese discharge noted   Small amount of thin gray vaginal discharge noted  no tenderness noted, no CMT  no bleeding/ blood   no foreign bodies   Cervix:   os closed  no bleeding  no lacerations  External:  no lesions, abrasions, lacerations,   normal labia majora and minora     Patient tolerated procedure well without complications. CONSULTS:  None      FINAL IMPRESSION      1. Acute cystitis without hematuria          DISPOSITION / PLAN     DISPOSITION Decision To Discharge 07/23/2022 07:03:43 PM      PATIENT REFERRED TO:  Nela Doe MD  [address]   [address]  [id number]  [phone]    Schedule an appointment as soon as possible for a visit in 2 days      [facility]  [address] [address]  [phone]  Go to   As needed, If symptoms worsen    DISCHARGE MEDICATIONS:  Discharge Medication List as of 7/23/2022  7:06 PM        START taking these medications    Details   cephALEXin (KEFLEX) 500 MG capsule Take 1 capsule by mouth in the morning and 1 capsule before bedtime. Do all this for 7 days. , Disp-14 capsule, R-0Normal             Azael Christian MD  Emergency Medicine Resident    (Please note that portions of thisnote were completed with a voice recognition program.  Efforts were made to edit the dictations but occasionally words are mis-transcribed.)       Azael Christian MD  Resident  07/26/22 [id number]

## 2022-07-23 NOTE — DISCHARGE INSTRUCTIONS
Take your medication as indicated and prescribed. If you are given an antibiotic then, make sure you get the prescription filled and take the antibiotics until finished. Drink plenty of water while taking the antibiotics. Avoid drinking alcohol or drinks that have caffeine in it while taking antibiotics. For pain use acetaminophen (Tylenol) or ibuprofen (Motrin / Advil), unless prescribed medications that have acetaminophen or ibuprofen (or similar medications) in it. You can take over the counter acetaminophen tablets (1 - 2 tablets of the 500-mg strength every 6 hours) or ibuprofen tablets (2 tablets every 4 hours). PLEASE RETURN TO THE EMERGENCY DEPARTMENT IMMEDIATELY for worsening symptoms, inability to urinate, worsening of blood in your urine, or if you develop any concerning symptoms such as: high fever not relieved by acetaminophen (Tylenol) and/or ibuprofen (Motrin / Advil), chills, shortness of breath, chest pain, feeling of your heart fluttering or racing, persistent nausea and/or vomiting, vomiting up blood, blood in your stool, loss of consciousness, numbness, weakness or tingling in the arms or legs or change in color of the extremities, changes in mental status, persistent headache, blurry vision, loss of bladder / bowel.

## 2022-07-23 NOTE — ED TRIAGE NOTES
Patient presented to the Ed with vaginal irritation stated she may possibly have a yeast infection from using different soaps. Once patient is assessed, orders will be followed as directed.  Vitals have been obtained and WNL

## 2022-07-25 LAB
C TRACH DNA GENITAL QL NAA+PROBE: NEGATIVE
CULTURE: NORMAL
N. GONORRHOEAE DNA: NEGATIVE
SPECIMEN DESCRIPTION: NORMAL
SPECIMEN DESCRIPTION: NORMAL

## 2022-07-26 ASSESSMENT — ENCOUNTER SYMPTOMS
COUGH: 0
ABDOMINAL PAIN: 0
DIARRHEA: 0
RHINORRHEA: 0
SORE THROAT: 0
NAUSEA: 0
SHORTNESS OF BREATH: 0
VOMITING: 0

## 2022-08-12 ENCOUNTER — HOSPITAL ENCOUNTER (EMERGENCY)
Age: 27
Discharge: HOME | End: 2022-08-12
Attending: EMERGENCY MEDICINE
Payer: MEDICARE

## 2022-08-12 VITALS
RESPIRATION RATE: 18 BRPM | WEIGHT: 144 LBS | HEART RATE: 75 BPM | BODY MASS INDEX: 26.5 KG/M2 | TEMPERATURE: 98.2 F | DIASTOLIC BLOOD PRESSURE: 75 MMHG | OXYGEN SATURATION: 100 % | SYSTOLIC BLOOD PRESSURE: 129 MMHG | HEIGHT: 62 IN

## 2022-08-12 DIAGNOSIS — G40.919 BREAKTHROUGH SEIZURE (HCC): Primary | ICD-10-CM

## 2022-08-12 LAB — KEPPRA: 12 UG/ML

## 2022-08-12 PROCEDURE — 36415 COLL VENOUS BLD VENIPUNCTURE: CPT

## 2022-08-12 PROCEDURE — 99283 EMERGENCY DEPT VISIT LOW MDM: CPT

## 2022-08-12 PROCEDURE — 80177 DRUG SCRN QUAN LEVETIRACETAM: CPT

## 2022-08-12 PROCEDURE — 80235 DRUG ASSAY LACOSAMIDE: CPT

## 2022-08-12 PROCEDURE — 6370000000 HC RX 637 (ALT 250 FOR IP)

## 2022-08-12 RX ORDER — ONDANSETRON 4 MG/1
4 TABLET, ORALLY DISINTEGRATING ORAL ONCE
Status: COMPLETED | OUTPATIENT
Start: 2022-08-12 | End: 2022-08-12

## 2022-08-12 RX ORDER — LACOSAMIDE 100 MG/1
100 TABLET, FILM COATED ORAL 2 TIMES DAILY
COMMUNITY
Start: 2022-06-21

## 2022-08-12 RX ORDER — ONDANSETRON 4 MG/1
4 TABLET, FILM COATED ORAL EVERY 8 HOURS PRN
Qty: 9 TABLET | Refills: 0 | Status: SHIPPED | OUTPATIENT
Start: 2022-08-12 | End: 2022-08-15

## 2022-08-12 RX ORDER — ACETAMINOPHEN 500 MG
1000 TABLET ORAL ONCE
Status: COMPLETED | OUTPATIENT
Start: 2022-08-12 | End: 2022-08-12

## 2022-08-12 RX ADMIN — ACETAMINOPHEN 1000 MG: 500 TABLET ORAL at 19:14

## 2022-08-12 RX ADMIN — ONDANSETRON 4 MG: 4 TABLET, ORALLY DISINTEGRATING ORAL at 17:50

## 2022-08-12 ASSESSMENT — PAIN DESCRIPTION - LOCATION: LOCATION: HEAD

## 2022-08-12 ASSESSMENT — PAIN - FUNCTIONAL ASSESSMENT
PAIN_FUNCTIONAL_ASSESSMENT: NONE - DENIES PAIN
PAIN_FUNCTIONAL_ASSESSMENT: 0-10

## 2022-08-12 NOTE — DISCHARGE INSTRUCTIONS
Thank you for visiting [facility] Emergency Department. You need to call Dr. Alen Millard to make an appointment as directed for follow up. Please return to emergency department for any new or worrisome symptoms including any further seizure-like activity, vomiting, fever, diarrhea. Please adhere to taking your seizure medications as directed. Take Zofran as needed up to 3 times per day over the next few days for any nausea. Should you have any questions regarding your care or further treatment, please call [address] [place] [address] Emergency Department at [phone].

## 2022-08-12 NOTE — ED PROVIDER NOTES
[address] ED  eMERGENCY dEPARTMENT eNCOUnter   Attending Attestation     Pt Name: Kiarra Flynn  MRN: [id number]  [place] 1995  Date of evaluation: 8/12/22       Kiarra Flynn is a 32 y.o. female who presents with Seizures      History:   Patient had a seizure at home. Patient does have a history of seizures and did state that she felt nauseous this morning and did not take her normal meds this morning. Patient states she feels the way she normally does after seizure. Exam: Vitals:   Vitals:    08/12/22 1736   BP: 114/69   Pulse: 75   Resp: 18   Temp: 98.2 °F (36.8 °C)   TempSrc: Oral   SpO2: 100%   Weight: 144 lb (65.3 kg)   Height: 5' 2\" (1.575 m)     Neurologically intact. I performed a history and physical examination of the patient and discussed management with the resident. I reviewed the residents note and agree with the documented findings and plan of care. Any areas of disagreement are noted on the chart. I was personally present for the key portions of any procedures. I have documented in the chart those procedures where I was not present during the key portions. I have personally reviewed all images and agree with the resident's interpretation. I have reviewed the emergency nurses triage note. I agree with the chief complaint, past medical history, past surgical history, allergies, medications, social and family history as documented unless otherwise noted below. Documentation of the HPI, Physical Exam and Medical Decision Making performed by medical students or scribes is based on my personal performance of the HPI, PE and MDM. I personally evaluated and examined the patient in conjunction with the APC and agree with the assessment, treatment plan, and disposition of the patient as recorded by the APC. Additional findings are as noted.     Leti Martinez MD  Attending Emergency  Physician              Lawrence Ramirez MD  08/12/22 [id number]

## 2022-08-12 NOTE — ED NOTES
Mode of arrival (squad #, walk in, police, etc) : EMS      Chief complaint(s): Seizures      Arrival Note (brief scenario, treatment PTA, etc). : Pt arrives via EMS from home s/p a seizure. Pt states that she has a history of epilepsy and has been having seizures since she was 25. Pt is compliant with her Keppra medication and usually has absent seizures; today at 1600 she had a witnessed tonic seizure. She felt as if she was going to seize and sat down - pt's significant other was there to ensure no trauma occurred during the event. EMS reports that she was still post ictal when they arrived however upon ER arrival she was A&OX4 and has a complaint of mild diffuse headache. Pt denies abdominal pain, chest pain, SOB. C= \"Have you ever felt that you should Cut down on your drinking? \"  No  A= \"Have people Annoyed you by criticizing your drinking? \"  No  G= \"Have you ever felt bad or Guilty about your drinking? \"  No  E= \"Have you ever had a drink as an Eye-opener first thing in the morning to steady your nerves or to help a hangover? \"  No      Deferred []      Reason for deferring: N/A    *If yes to two or more: probable alcohol abuse. Thuy Boateng RN  08/12/22 2591

## 2022-08-12 NOTE — ED PROVIDER NOTES
[address]  Emergency Department Encounter  Emergency Medicine Resident     Pt Jia Puente  MRN: [id number]  [place] 1995  Date of evaluation: 8/12/22  PCP:  Comfort Alcala MD      CHIEF COMPLAINT       Chief Complaint   Patient presents with    Seizures       HISTORY OF PRESENT ILLNESS  (Location/Symptom, Timing/Onset, Context/Setting, Quality, Duration, Modifying Factors, Severity.)      Tamir Cruz is a 32 y.o. female who presents with complaints of seizure-like activity just prior to arrival that lasted a few minutes and then postictal period lasted 5 minutes. Patient notes history of longstanding seizure history and follows with Dr. Jermain De La Rosa at [facility]. Notes she did not take her Thea Render today due to nausea. No vomiting or diarrhea. No recent fever, cough, chest pain, shortness of breath. Denied hitting head. Notes she felt preseizure aura and then sat down and then significant other witnessed seizure-like activity described as tonic behavior. Patient notes she feels back to baseline but still has some nausea. Denied any illegal drug use or alcohol consumption. PAST MEDICAL / SURGICAL / SOCIAL / FAMILY HISTORY      has a past medical history of Anxiety, Asthma, Depression, and Seizures ([facility] [address] Reviewed with patient     has a past surgical history that includes brain surgery.   Reviewed with patient    Social History     Socioeconomic History    Marital status: Single     Spouse name: Not on file    Number of children: Not on file    Years of education: Not on file    Highest education level: Not on file   Occupational History    Not on file   Tobacco Use    Smoking status: Every Day     Packs/day: 0.25     Years: 2.00     Pack years: 0.50     Types: Cigarettes    Smokeless tobacco: Never    Tobacco comments:     2-3 per day   Substance and Sexual Activity    Alcohol use: No    Drug use: Yes     Types: Marijuana Skylar Kristi)     Comment: daily    Sexual activity: Yes Partners: Male   Other Topics Concern    Not on file   Social History Narrative    Not on file     Social Determinants of Health     Financial Resource Strain: Low Risk     Difficulty of Paying Living Expenses: Not very hard   Food Insecurity: No Food Insecurity    Worried About Running Out of Food in the Last Year: Never true    Ran Out of Food in the Last Year: Never true   Transportation Needs: Not on file   Physical Activity: Not on file   Stress: Not on file   Social Connections: Not on file   Intimate Partner Violence: Not on file   Housing Stability: Not on file       Family History   Problem Relation Age of Onset    Asthma Mother     Depression Mother     High Blood Pressure Mother        Allergies:  Patient has no known allergies. Home Medications:  Prior to Admission medications    Medication Sig Start Date End Date Taking? Authorizing Provider   ondansetron (ZOFRAN) 4 MG tablet Take 1 tablet by mouth every 8 hours as needed for Nausea 8/12/22 8/15/22 Yes Jie Weinberg MD   VIMPAT 100 MG TABS tablet Take 100 mg by mouth in the morning and at bedtime.  6/21/22   Historical Provider, MD   omeprazole (PRILOSEC) 20 MG delayed release capsule Take 1 capsule by mouth 2 times daily (before meals) 6/20/22   Genie Breen MD   fluconazole (DIFLUCAN) 150 MG tablet take 1 tablet by mouth AS A ONE TIME DOSE 12/23/21   Historical Provider, MD   norgestimate-ethinyl estradiol (ORTHO-CYCLEN) 0.25-35 MG-MCG per tablet take 1 tablet by mouth once daily 12/22/21   Historical Provider, MD   albuterol sulfate HFA (VENTOLIN HFA) 108 (90 Base) MCG/ACT inhaler Inhale 2 puffs into the lungs every 6 hours as needed for Wheezing 2/25/22   Kacy Gtz MD   levETIRAcetam (KEPPRA) 750 MG tablet Take 2 tablets by mouth 2 times daily 2/25/22 5/26/22  Kacy Gtz MD   folic acid (FOLVITE) 1 MG tablet Take 1 tablet by mouth daily 2/25/22   Kacy Gtz MD   sertraline (ZOLOFT) 100 MG tablet Take 1 tablet by mouth daily 2/25/22   Kathy Bar MD   phenytoin (DILANTIN) 100 MG ER capsule Take 1 capsule by mouth 3 times daily  Patient not taking: Reported on 8/12/2022 2/25/22   Kathy Bar MD   nicotine (NICODERM CQ) 7 MG/24HR Place 1 patch onto the skin every 24 hours 11/19/20 11/19/21  Jarad Duran MD   nicotine polacrilex (NICORETTE) 2 MG gum Take 1 each by mouth as needed for Smoking cessation 11/19/20   Jarad Duran MD   Prenatal Vit-Fe Fumarate-FA (PRENATAL VITAMIN PLUS LOW IRON) 27-1 MG TABS Take 1 tablet by mouth daily 11/19/20   Jarad Duran MD   cetirizine (ZYRTEC ALLERGY) 10 MG tablet Take 1 tablet by mouth daily 7/12/19   Kayce Gardner MD       REVIEW OF SYSTEMS    (2-9 systems for level 4, 10 or more for level 5)      Review of Systems   Constitutional:  Negative for chills and fever. HENT:  Negative for congestion and rhinorrhea. Eyes:  Negative for photophobia and visual disturbance. Respiratory:  Negative for shortness of breath and wheezing. Cardiovascular:  Negative for chest pain and palpitations. Gastrointestinal:  Positive for nausea. Negative for abdominal pain and vomiting. Genitourinary:  Negative for dysuria and frequency. Musculoskeletal:  Negative for back pain and neck pain. Skin:  Negative for rash and wound. Neurological:  Positive for seizures. Negative for dizziness, weakness, light-headedness and headaches. PHYSICAL EXAM   (up to 7 for level 4, 8 or more for level 5)      INITIAL VITALS:   /75   Pulse 75   Temp 98.2 °F (36.8 °C) (Oral)   Resp 18   Ht 5' 2\" (1.575 m)   Wt 144 lb (65.3 kg)   SpO2 100%   BMI 26.34 kg/m²     Physical Exam  Vitals and nursing note reviewed. Constitutional:       General: She is not in acute distress. HENT:      Head: Atraumatic.       Right Ear: External ear normal.      Left Ear: External ear normal.      Nose: Nose normal.      Mouth/Throat:      Mouth: Mucous membranes are moist.      Pharynx: Oropharynx is clear.   Eyes:      Extraocular Movements: Extraocular movements intact. Conjunctiva/sclera: Conjunctivae normal.      Pupils: Pupils are equal, round, and reactive to light. Cardiovascular:      Rate and Rhythm: Normal rate and regular rhythm. Pulmonary:      Effort: Pulmonary effort is normal. No respiratory distress. Breath sounds: Normal breath sounds. No wheezing. Abdominal:      Palpations: Abdomen is soft. Tenderness: There is no abdominal tenderness. There is no guarding or rebound. Musculoskeletal:         General: Normal range of motion. Cervical back: Normal range of motion. No tenderness. Skin:     General: Skin is warm and dry. Capillary Refill: Capillary refill takes less than 2 seconds. Neurological:      General: No focal deficit present. Mental Status: She is alert and oriented to person, place, and time. Cranial Nerves: No cranial nerve deficit. Sensory: No sensory deficit. Motor: No weakness.        DIFFERENTIAL  DIAGNOSIS     PLAN (LABS / IMAGING / EKG):  Orders Placed This Encounter   Procedures    Levetiracetam Level    LACOSAMIDE LEVEL    Inpatient consult to Neurology       MEDICATIONS ORDERED:  Orders Placed This Encounter   Medications    ondansetron (ZOFRAN-ODT) disintegrating tablet 4 mg    ondansetron (ZOFRAN) 4 MG tablet     Sig: Take 1 tablet by mouth every 8 hours as needed for Nausea     Dispense:  9 tablet     Refill:  0    acetaminophen (TYLENOL) tablet 1,000 mg       DDX: Breakthrough seizure, medication noncompliance    DIAGNOSTIC RESULTS / EMERGENCY DEPARTMENT COURSE / MDM   LAB RESULTS:  Results for orders placed or performed during the hospital encounter of 08/12/22   Levetiracetam Level   Result Value Ref Range    Levetiracetam Lvl 12 ug/mL       IMPRESSION: Breakthrough seizure    RADIOLOGY:  No orders to display         EMERGENCY DEPARTMENT COURSE:  80-year-old female presented to ED with complaints of breakthrough seizure that happened just prior to arrival, 1 episode in total that was witnessed by significant other. Patient noted postictal period lasting approximately 5 minutes after event. Notes she did not take her seizure medication this morning due to feeling nauseated. No vomiting, diarrhea. On exam, afebrile, nontachycardic, oropharynx clear without evidence of tongue laceration, head atraumatic, no C-spine tenderness, neuro exam nonfocal.  Obtained Keppra and Vimpat levels as send out. Discussed case with Dr. Patito Gonzalez who is patient's neurologist who recommended follow-up outpatient within the next 1 week. No further seizure-like activity in ED. Patient nausea treated. Offered giving dose of seizure medications but patient noted she would just take hers at home. Patient instructed return for any further seizure-like activity, numbness, weakness, tingling, vomiting, diarrhea. ED Course as of 08/13/22 0227   Fri Aug 12, 2022   1736 Supposed be on Dilantin and Keppra. History of seizures [AR]   [phone] Patient unsure if she has had multiple seizures today. Per EMS patient may have had multiple seizures. Significant other is in route but patient is working on getting a hold of him via phone to find out if she has had multiple seizures today as he was a witness to them. [AR]   [phone] Patient was able to get a hold of significant other who is just plan to hospital and will provide information when he gets here. [AR]   1757 On 1500 mg Keppra twice daily and Vimpat 100 mg twice daily. [AR]   7567 Patient significant other confirmed that patient only had 1 seizure. Did not have multiple seizures [AR]   3037 Page out to Dr. Patito Gonzalez as that is patient's neurologist [AR]   115 Patient did note she did not take her seizure medications this morning due to nausea. [AR]   [id number] With Dr. Patito Gonzalez who recommended follow-up outpatient in the next 1 week but did not recommend any changes to medications.   Recommended stress and the importance of adhering to her antiepileptics. [AR]      ED Course User Index  [AR] Charu Munoz MD       No notes of [facility] Admission Criteria type on file. PROCEDURES:  None    CONSULTS:  IP CONSULT TO NEUROLOGY    CRITICAL CARE:  None        FINAL IMPRESSION      1.  Breakthrough seizure [facility])          DISPOSITION / PLAN     DISPOSITION Decision To Discharge 08/12/2022 06:21:55 PM      PATIENT REFERRED TO:  Ani Leal MD  [address] [place] [address]  [phone]    In 1 week      [facility] ED  [facility] [address]  [phone]    As needed    DISCHARGE MEDICATIONS:  Discharge Medication List as of 8/12/2022  6:48 PM        START taking these medications    Details   ondansetron (ZOFRAN) 4 MG tablet Take 1 tablet by mouth every 8 hours as needed for Nausea, Disp-9 tablet, R-0Print             John Denson MD  Emergency Medicine Resident    (Please note that portions of thisnote were completed with a voice recognition program.  Efforts were made to edit the dictations but occasionally words are mis-transcribed.)      Charu Munoz MD  Resident  08/13/22 6308

## 2022-08-13 ASSESSMENT — ENCOUNTER SYMPTOMS
VOMITING: 0
BACK PAIN: 0
NAUSEA: 1
ABDOMINAL PAIN: 0
PHOTOPHOBIA: 0
WHEEZING: 0
RHINORRHEA: 0
SHORTNESS OF BREATH: 0

## 2022-08-15 ENCOUNTER — HOSPITAL ENCOUNTER (EMERGENCY)
Age: 27
Discharge: HOME | End: 2022-08-15
Attending: EMERGENCY MEDICINE
Payer: MEDICARE

## 2022-08-15 VITALS
SYSTOLIC BLOOD PRESSURE: 131 MMHG | DIASTOLIC BLOOD PRESSURE: 92 MMHG | RESPIRATION RATE: 17 BRPM | TEMPERATURE: 97.7 F | OXYGEN SATURATION: 100 % | HEART RATE: 75 BPM

## 2022-08-15 DIAGNOSIS — Z01.89 ENCOUNTER FOR LABORATORY TEST: Primary | ICD-10-CM

## 2022-08-15 LAB
BILIRUBIN URINE: NEGATIVE
CANDIDA SPECIES, DNA PROBE: NEGATIVE
CASTS UA: ABNORMAL /LPF (ref 0–2)
COLOR: YELLOW
EPITHELIAL CELLS UA: ABNORMAL /HPF (ref 0–5)
GARDNERELLA VAGINALIS, DNA PROBE: NEGATIVE
GLUCOSE URINE: NEGATIVE
HAV IGM SER IA-ACNC: NONREACTIVE
HCG QUALITATIVE: NEGATIVE
HEPATITIS B CORE IGM ANTIBODY: NONREACTIVE
HEPATITIS B SURFACE ANTIGEN: NONREACTIVE
HEPATITIS C ANTIBODY: NONREACTIVE
HIV AG/AB: NONREACTIVE
KETONES, URINE: NEGATIVE
LEUKOCYTE ESTERASE, URINE: NEGATIVE
NITRITE, URINE: NEGATIVE
PH UA: 7 (ref 5–8)
PROTEIN UA: NEGATIVE
RBC UA: ABNORMAL /HPF (ref 0–2)
SOURCE: NORMAL
SPECIFIC GRAVITY UA: 1 (ref 1–1.03)
T. PALLIDUM, IGG: NONREACTIVE
TRICHOMONAS VAGINALIS DNA: NEGATIVE
TURBIDITY: CLEAR
URINE HGB: NEGATIVE
UROBILINOGEN, URINE: NORMAL
WBC UA: ABNORMAL /HPF (ref 0–5)

## 2022-08-15 PROCEDURE — 87480 CANDIDA DNA DIR PROBE: CPT

## 2022-08-15 PROCEDURE — 86780 TREPONEMA PALLIDUM: CPT

## 2022-08-15 PROCEDURE — 87389 HIV-1 AG W/HIV-1&-2 AB AG IA: CPT

## 2022-08-15 PROCEDURE — 81001 URINALYSIS AUTO W/SCOPE: CPT

## 2022-08-15 PROCEDURE — 87491 CHLMYD TRACH DNA AMP PROBE: CPT

## 2022-08-15 PROCEDURE — 99283 EMERGENCY DEPT VISIT LOW MDM: CPT

## 2022-08-15 PROCEDURE — 87660 TRICHOMONAS VAGIN DIR PROBE: CPT

## 2022-08-15 PROCEDURE — 80074 ACUTE HEPATITIS PANEL: CPT

## 2022-08-15 PROCEDURE — 87591 N.GONORRHOEAE DNA AMP PROB: CPT

## 2022-08-15 PROCEDURE — 84703 CHORIONIC GONADOTROPIN ASSAY: CPT

## 2022-08-15 PROCEDURE — 87510 GARDNER VAG DNA DIR PROBE: CPT

## 2022-08-15 ASSESSMENT — PAIN - FUNCTIONAL ASSESSMENT: PAIN_FUNCTIONAL_ASSESSMENT: NONE - DENIES PAIN

## 2022-08-15 NOTE — ED PROVIDER NOTES
[facility]  Emergency Department  Faculty Attestation     I performed a history and physical examination of the patient and discussed management with the resident. I reviewed the residents note and agree with the documented findings and plan of care. Any areas of disagreement are noted on the chart. I was personally present for the key portions of any procedures. I have documented in the chart those procedures where I was not present during the key portions. I have reviewed the emergency nurses triage note. I agree with the chief complaint, past medical history, past surgical history, allergies, medications, social and family history as documented unless otherwise noted below. For Physician Assistant/ Nurse Practitioner cases/documentation I have personally evaluated this patient and have completed at least one if not all key elements of the E/M (history, physical exam, and MDM). Additional findings are as noted. Primary Care Physician:  Janny Hu MD    Screenings:  [unfilled]    CHIEF COMPLAINT       Chief Complaint   Patient presents with    Exposure to STD     PT went to donate plasma and center said it was \"reactive\" to HIV test.        RECENT VITALS:   Temp: 97.7 °F (36.5 °C),  Heart Rate: 75, Resp: 17, BP: (!) 131/92    LABS:  Labs Reviewed   VAGINITIS DNA PROBE   C.TRACHOMATIS N.GONORRHOEAE DNA   HIV SCREEN   T. PALLIDUM AB   HEPATITIS PANEL, ACUTE   HCG, SERUM, QUALITATIVE   URINALYSIS WITH MICROSCOPIC       Radiology  No orders to display           Attending Physician Additional  Notes    Patient was told by the plasma donation center to come for repeat HIV testing since the COVID screening test was reactive from her donation yesterday. She has no known history of HIV, had a child delivery last year and HIV and syphilis testing was negative at that time. She had pelvic exam several months ago which was negative for GC/chlamydia.   She does not use IV drugs. No symptoms. No rashes. On exam she nontoxic afebrile slightly hypertensive vital signs otherwise normal.  Plan is labs for HIV, syphilis, will do pelvic exam for GC/chlamydia, discharged with return precautions, follow-up PCP and infectious disease. Bryon Doe.  Valeria Rothman MD, [address] [facility],[address]  Attending Emergency  Physician                Kami Gutierrez MD  08/15/22 0999

## 2022-08-15 NOTE — ED PROVIDER NOTES
Patient states that she     [address] ED  Emergency Department Encounter  Emergency Medicine Resident     Pt Name: Dahiana Lind  MRN: [id number]  [place] 1995  Date of evaluation: 8/15/22  PCP:  Melony Lozano MD    CHIEF COMPLAINT       Chief Complaint   Patient presents with    Exposure to STD     PT went to donate plasma and center said it was \"reactive\" to HIV test.        HISTORY OFPRESENT ILLNESS  (Location/Symptom, Timing/Onset, Context/Setting, Quality, Duration, Modifying Leeta Bailey.)      Dahiana Lind is a 32 y.o. female with past medical history of anxiety, depression, asthma, seizure who presents for HIV test.  Went to donate plasma today and they told her that the blood they tested last week was reactive for HIV. They would not let her donate blood and referred her here for further laboratory testing. Patient denies any known exposure. She denies IV drug use, any needle sharing, new sexual partners, blood transfusions. She denies any symptoms including fever, chills, rhinorrhea, congestion, cough, chest pain, nausea, vomiting, diarrhea, abdominal pain. Patient states she has donated plasma several times in the past without issue. She also reports that she was tested for HIV last year during her pregnancy and was negative. Patient states she was recently tested for gonorrhea and chlamydia a few weeks ago and those were also negative. PAST MEDICAL / SURGICAL / SOCIAL / FAMILY HISTORY      has a past medical history of Anxiety, Asthma, Depression, and Seizures ([facility] [address] has a past surgical history that includes brain surgery. Social:  reports that she has been smoking cigarettes. She has a 0.50 pack-year smoking history. She has never used smokeless tobacco. She reports current drug use. Drug: Marijuana Estil Catena). She reports that she does not drink alcohol.     Family Hx:   Family History   Problem Relation Age of Onset    Asthma Mother     Depression Mother High Blood Pressure Mother         Allergies:  Patient has no known allergies. Home Medications:  Prior to Admission medications    Medication Sig Start Date End Date Taking? Authorizing Provider   VIMPAT 100 MG TABS tablet Take 100 mg by mouth in the morning and at bedtime.  6/21/22   Historical Provider, MD   omeprazole (PRILOSEC) 20 MG delayed release capsule Take 1 capsule by mouth 2 times daily (before meals) 6/20/22   Josef Frost MD   fluconazole (DIFLUCAN) 150 MG tablet take 1 tablet by mouth AS A ONE TIME DOSE 12/23/21   Historical Provider, MD   norgestimate-ethinyl estradiol (ORTHO-CYCLEN) 0.25-35 MG-MCG per tablet take 1 tablet by mouth once daily 12/22/21   Historical Provider, MD   albuterol sulfate HFA (VENTOLIN HFA) 108 (90 Base) MCG/ACT inhaler Inhale 2 puffs into the lungs every 6 hours as needed for Wheezing 2/25/22   Oswaldo Carpenter MD   levETIRAcetam (KEPPRA) 750 MG tablet Take 2 tablets by mouth 2 times daily 2/25/22 5/26/22  Oswaldo Carpenter MD   folic acid (FOLVITE) 1 MG tablet Take 1 tablet by mouth daily 2/25/22   Oswaldo Carpenter MD   sertraline (ZOLOFT) 100 MG tablet Take 1 tablet by mouth daily 2/25/22   Oswaldo Carpenter MD   phenytoin (DILANTIN) 100 MG ER capsule Take 1 capsule by mouth 3 times daily  Patient not taking: Reported on 8/12/2022 2/25/22   Oswaldo Carpenter MD   nicotine (NICODERM CQ) 7 MG/24HR Place 1 patch onto the skin every 24 hours 11/19/20 11/19/21  Dayanna aH MD   nicotine polacrilex (NICORETTE) 2 MG gum Take 1 each by mouth as needed for Smoking cessation 11/19/20   Dayanna Ha MD   Prenatal Vit-Fe Fumarate-FA (PRENATAL VITAMIN PLUS LOW IRON) 27-1 MG TABS Take 1 tablet by mouth daily 11/19/20   Dayanna Ha MD   cetirizine (ZYRTEC ALLERGY) 10 MG tablet Take 1 tablet by mouth daily 7/12/19   Gurinder Washburn MD       REVIEW OFSYSTEMS    (2-9 systems for level 4, 10 or more for level 5)      Review of Systems   Constitutional:  Negative for chills and fever.   HENT:  Negative for congestion, rhinorrhea and sore throat. Eyes:  Negative for visual disturbance. Respiratory:  Negative for cough and shortness of breath. Cardiovascular:  Negative for chest pain and leg swelling. Gastrointestinal:  Negative for abdominal pain, constipation, diarrhea, nausea and vomiting. Genitourinary:  Negative for dysuria, frequency and hematuria. Musculoskeletal:  Negative for arthralgias, back pain and neck pain. Skin:  Negative for rash. Neurological:  Negative for weakness, light-headedness, numbness and headaches. Psychiatric/Behavioral:  Negative for confusion. All other systems reviewed and are negative. PHYSICAL EXAM   (up to 7 for level 4, 8 or more forlevel 5)      INITIAL VITALS:   Vitals:    08/15/22 1745   BP: (!) 131/92   Pulse: 75   Resp: 17   Temp: 97.7 °F (36.5 °C)   TempSrc: Oral   SpO2: 100%        Physical Exam  Vitals and nursing note reviewed. Constitutional:       General: She is not in acute distress. Comments: Adult female sitting in stretcher no acute distress. Speaks in full sentences and answers questions appropriately. HENT:      Head: Normocephalic and atraumatic. Mouth/Throat:      Mouth: Mucous membranes are moist.      Pharynx: Oropharynx is clear. Eyes:      Pupils: Pupils are equal, round, and reactive to light. Cardiovascular:      Rate and Rhythm: Normal rate and regular rhythm. Pulmonary:      Effort: Pulmonary effort is normal. No respiratory distress. Breath sounds: Normal breath sounds. Abdominal:      General: There is no distension. Palpations: Abdomen is soft. Tenderness: There is no abdominal tenderness. Genitourinary:     Comments: Normal external female genitalia. No vaginal discharge or vaginal bleeding in the vaginal vault. Cervix is normal and without lesions. Cervical os is closed. There is no cervical motion or adnexal tenderness bilaterally.   Musculoskeletal: Cervical back: Neck supple. No rigidity. Skin:     General: Skin is warm and dry. Findings: No rash. Neurological:      Mental Status: She is alert. Psychiatric:         Mood and Affect: Mood normal.         Behavior: Behavior normal.       DIFFERENTIAL  DIAGNOSIS     DDX: Lab error, encounter for lab test, HIV    Initial MDM/Plan: 32 y.o. female who presents for HIV test after her blood tested \"reactive\" for HIV at a plasma donation center. Patient denies any known exposures or symptoms. On physical exam she is nontoxic-appearing with normal vital signs. Suspect that this is a lab error, especially given that patient has had recent lab work that showed she was negative for HIV. However, will test for HIV, syphilis and hepatitis. Offered patient pelvic exam for testing for gonorrhea and chlamydia and patient is agreeable to this. Discussed with patient that even if her lab work is negative that she should follow-up with her primary care physician in the next couple of months to be retested just to be sure. She was instructed to avoid any sexual contact till her lab work results. DIAGNOSTIC RESULTS / EMERGENCYDEPARTMENT COURSE / MDM     LABS:  Results for orders placed or performed during the hospital encounter of 08/15/22   Vaginitis DNA Probe    Specimen: Vaginal   Result Value Ref Range    Source . VAGINAL SWAB     Trichomonas Vaginalis DNA NEGATIVE NEGATIVE    GARDNERELLA VAGINALIS, DNA PROBE NEGATIVE NEGATIVE    CANDIDA SPECIES, DNA PROBE NEGATIVE NEGATIVE   C.trachomatis N.gonorrhoeae DNA    Specimen: Cervix   Result Value Ref Range    Specimen Description . CERVIX     C. trachomatis DNA NEGATIVE NEGATIVE    N. gonorrhoeae DNA NEGATIVE NEGATIVE   HIV Screen   Result Value Ref Range    HIV Ag/Ab NONREACTIVE NONREACTIVE   T. pallidum Ab   Result Value Ref Range    T. pallidum, IgG NONREACTIVE NONREACTIVE   Hepatitis Panel, Acute   Result Value Ref Range    Hepatitis B Surface Ag NONREACTIVE NONREACTIVE    Hepatitis C Ab NONREACTIVE NONREACTIVE    Hep B Core Ab, IgM NONREACTIVE NONREACTIVE    Hep A IgM NONREACTIVE NONREACTIVE   HCG Qualitative, Serum   Result Value Ref Range    hCG Qual NEGATIVE NEGATIVE   Urinalysis with Microscopic   Result Value Ref Range    Color, UA Yellow Yellow    Turbidity UA Clear Clear    Glucose, Ur NEGATIVE NEGATIVE    Bilirubin Urine NEGATIVE NEGATIVE    Ketones, Urine NEGATIVE NEGATIVE    Specific Gravity, UA 1.004 (L) 1.005 - 1.030    Urine Hgb NEGATIVE NEGATIVE    pH, UA 7.0 5.0 - 8.0    Protein, UA NEGATIVE NEGATIVE    Urobilinogen, Urine Normal Normal    Nitrite, Urine NEGATIVE NEGATIVE    Leukocyte Esterase, Urine NEGATIVE NEGATIVE    WBC, UA 0 TO 2 0 - 5 /HPF    RBC, UA None 0 - 2 /HPF    Casts UA None 0 - 2 /LPF    Epithelial Cells UA 0 TO 2 0 - 5 /HPF         RADIOLOGY:  No results found. EKG  None      MEDICATIONS ORDERED:  No orders of the defined types were placed in this encounter. PROCEDURES:  None      CONSULTS:  None          FINAL IMPRESSION      1. Encounter for laboratory test          DISPOSITION / PLAN     DISPOSITION Decision To Discharge 08/15/2022 07:27:57 PM      PATIENT REFERRED TO:  Bandar Forde MD  [address]   [place] [id number]  [phone]    Schedule an appointment as soon as possible for a visit       Karyna Le MD  [address] [place] [address] [address] [place] [address] [address] [phone]      Infectious disease as needed    DISCHARGE MEDICATIONS:  Discharge Medication List as of 8/15/2022  7:31 PM          Mahad Delatorre DO  Emergency Medicine Resident    (Please note that portions of this note were completed with a voice recognition program.Efforts were made to edit the dictations but occasionally words are mis-transcribed.)        Mahad Delatorre DO  Resident  08/16/22 1526

## 2022-08-16 LAB
C TRACH DNA GENITAL QL NAA+PROBE: NEGATIVE
LACOSAMIDE: 3.2 UG/ML (ref 1–10)
N. GONORRHOEAE DNA: NEGATIVE
SPECIMEN DESCRIPTION: NORMAL

## 2022-08-16 ASSESSMENT — ENCOUNTER SYMPTOMS
RHINORRHEA: 0
ABDOMINAL PAIN: 0
DIARRHEA: 0
VOMITING: 0
SORE THROAT: 0
COUGH: 0
NAUSEA: 0
SHORTNESS OF BREATH: 0
BACK PAIN: 0
CONSTIPATION: 0

## 2022-10-10 ENCOUNTER — HOSPITAL ENCOUNTER (EMERGENCY)
Age: 27
Discharge: HOME | End: 2022-10-10
Attending: EMERGENCY MEDICINE
Payer: MEDICARE

## 2022-10-10 VITALS
TEMPERATURE: 98.8 F | DIASTOLIC BLOOD PRESSURE: 82 MMHG | HEART RATE: 85 BPM | HEIGHT: 62 IN | BODY MASS INDEX: 26.13 KG/M2 | RESPIRATION RATE: 16 BRPM | SYSTOLIC BLOOD PRESSURE: 137 MMHG | WEIGHT: 142 LBS | OXYGEN SATURATION: 100 %

## 2022-10-10 DIAGNOSIS — J02.9 VIRAL PHARYNGITIS: Primary | ICD-10-CM

## 2022-10-10 LAB
S PYO AG THROAT QL: NEGATIVE
SOURCE: NORMAL

## 2022-10-10 PROCEDURE — 87880 STREP A ASSAY W/OPTIC: CPT

## 2022-10-10 PROCEDURE — 99283 EMERGENCY DEPT VISIT LOW MDM: CPT

## 2022-10-10 RX ORDER — ACETAMINOPHEN 325 MG/1
650 TABLET ORAL ONCE
Status: DISCONTINUED | OUTPATIENT
Start: 2022-10-10 | End: 2022-10-10 | Stop reason: HOSPADM

## 2022-10-10 ASSESSMENT — PAIN DESCRIPTION - LOCATION: LOCATION: THROAT

## 2022-10-10 ASSESSMENT — PAIN DESCRIPTION - ONSET: ONSET: ON-GOING

## 2022-10-10 ASSESSMENT — PAIN DESCRIPTION - PAIN TYPE: TYPE: ACUTE PAIN

## 2022-10-10 ASSESSMENT — PAIN - FUNCTIONAL ASSESSMENT: PAIN_FUNCTIONAL_ASSESSMENT: 0-10

## 2022-10-10 ASSESSMENT — PAIN DESCRIPTION - FREQUENCY: FREQUENCY: CONTINUOUS

## 2022-10-10 ASSESSMENT — PAIN SCALES - GENERAL: PAINLEVEL_OUTOF10: 10

## 2022-10-10 ASSESSMENT — PAIN DESCRIPTION - DESCRIPTORS: DESCRIPTORS: ACHING

## 2022-10-10 NOTE — ED PROVIDER NOTES
[facility] ED  Emergency Department Encounter  Emergency Medicine Resident     Pt Name: Johnathon Gray  MRN: [id number]  [place] 1995  Date of evaluation: 10/10/22  PCP:  Ling Wyman MD    CHIEF COMPLAINT       Chief Complaint   Patient presents with    Pharyngitis     X2 days       HISTORY OFPRESENT ILLNESS  (Location/Symptom, Timing/Onset, Context/Setting, Quality, Duration, Modifying Factors,Severity.)      Johnathon Gray is a 32 y. o.yo female who presents with sore throat for past few days. Patient also has congestion, denies cough. States she thinks she may have had a mild fever the other day however never took her temperature. Denies any chills. Has been tolerating secretions without difficulty. Eating and drinking without difficulty. Denies any sick contacts. Patient is healthy otherwise. PAST MEDICAL / SURGICAL / SOCIAL / FAMILY HISTORY      has a past medical history of Anxiety, Asthma, Depression, and Seizures ([facility] [address] has a past surgical history that includes brain surgery.      Social History     Socioeconomic History    Marital status: Single     Spouse name: Not on file    Number of children: Not on file    Years of education: Not on file    Highest education level: Not on file   Occupational History    Not on file   Tobacco Use    Smoking status: Every Day     Packs/day: 0.25     Years: 4.00     Pack years: 1.00     Types: Cigarettes    Smokeless tobacco: Never    Tobacco comments:     2-3 per day   Substance and Sexual Activity    Alcohol use: Yes     Comment: socially    Drug use: Not Currently     Types: Marijuana Daril Candelario)     Comment: daily    Sexual activity: Yes     Partners: Male   Other Topics Concern    Not on file   Social History Narrative    Not on file     Social Determinants of Health     Financial Resource Strain: Low Risk     Difficulty of Paying Living Expenses: Not very hard   Food Insecurity: No Food Insecurity    Worried About Running Out of Food in the Last Year: Never true    Ran Out of Food in the Last Year: Never true   Transportation Needs: Not on file   Physical Activity: Not on file   Stress: Not on file   Social Connections: Not on file   Intimate Partner Violence: Not on file   Housing Stability: Not on file       Family History   Problem Relation Age of Onset    Asthma Mother     Depression Mother     High Blood Pressure Mother         Allergies:  Patient has no known allergies. Home Medications:  Prior to Admission medications    Medication Sig Start Date End Date Taking? Authorizing Provider   VIMPAT 100 MG TABS tablet Take 100 mg by mouth in the morning and at bedtime.  6/21/22   Historical Provider, MD   omeprazole (PRILOSEC) 20 MG delayed release capsule Take 1 capsule by mouth 2 times daily (before meals) 6/20/22   Ailyn Benitez MD   fluconazole (DIFLUCAN) 150 MG tablet take 1 tablet by mouth AS A ONE TIME DOSE 12/23/21   Historical Provider, MD   norgestimate-ethinyl estradiol (ORTHO-CYCLEN) 0.25-35 MG-MCG per tablet take 1 tablet by mouth once daily 12/22/21   Historical Provider, MD   albuterol sulfate HFA (VENTOLIN HFA) 108 (90 Base) MCG/ACT inhaler Inhale 2 puffs into the lungs every 6 hours as needed for Wheezing 2/25/22   Miladys Banegas MD   levETIRAcetam (KEPPRA) 750 MG tablet Take 2 tablets by mouth 2 times daily 2/25/22 5/26/22  Miladys Banegas MD   folic acid (FOLVITE) 1 MG tablet Take 1 tablet by mouth daily 2/25/22   Miladys Banegas MD   sertraline (ZOLOFT) 100 MG tablet Take 1 tablet by mouth daily 2/25/22   Miladys Banegas MD   phenytoin (DILANTIN) 100 MG ER capsule Take 1 capsule by mouth 3 times daily  Patient not taking: Reported on 8/12/2022 2/25/22   Miladys Banegas MD   nicotine (NICODERM CQ) 7 MG/24HR Place 1 patch onto the skin every 24 hours 11/19/20 11/19/21  Thad Ortega MD   nicotine polacrilex (NICORETTE) 2 MG gum Take 1 each by mouth as needed for Smoking cessation 11/19/20   Thad Ortega MD Prenatal Vit-Fe Fumarate-FA (PRENATAL VITAMIN PLUS LOW IRON) 27-1 MG TABS Take 1 tablet by mouth daily 11/19/20   Rohit Vaughn MD   cetirizine (ZYRTEC ALLERGY) 10 MG tablet Take 1 tablet by mouth daily 7/12/19   Aubrie Jefferson MD       REVIEW OFSYSTEMS    (2-9 systems for level 4, 10 or more for level 5)      Review of Systems   Constitutional:  Negative for chills and fever. HENT:  Positive for sore throat. Negative for congestion and trouble swallowing. Eyes:  Negative for discharge and visual disturbance. Respiratory:  Negative for shortness of breath. Cardiovascular:  Negative for chest pain. Gastrointestinal:  Negative for abdominal pain. Musculoskeletal:  Negative for neck pain and neck stiffness. Skin:  Negative for rash. Neurological:  Negative for headaches. Psychiatric/Behavioral:  Negative for confusion. PHYSICAL EXAM   (up to 7 for level 4, 8 or more forlevel 5)      INITIAL VITALS:   Vitals:    10/10/22 1752   BP: 137/82   Pulse: 85   Resp: 16   Temp: 98.8 °F (37.1 °C)   TempSrc: Oral   SpO2: 100%   Weight: 142 lb (64.4 kg)   Height: 5' 2\" (1.575 m)         Physical Exam  Vitals reviewed. Constitutional:       General: She is not in acute distress. Appearance: Normal appearance. HENT:      Head: Normocephalic and atraumatic. Right Ear: External ear normal.      Left Ear: External ear normal.      Nose: Nose normal.      Mouth/Throat:      Comments: Posterior pharynx erythema. Enlarged tonsils bilaterally, with some exudates present. Tolerating secretions without difficulty. Eyes:      General:         Right eye: No discharge. Left eye: No discharge. Extraocular Movements: Extraocular movements intact. Cardiovascular:      Rate and Rhythm: Normal rate and regular rhythm. Pulmonary:      Effort: Pulmonary effort is normal. No respiratory distress. Musculoskeletal:      Cervical back: Normal range of motion.       Comments: Moving all 4 extremities   Skin:     General: Skin is warm. Neurological:      General: No focal deficit present. Mental Status: She is alert and oriented to person, place, and time. Cranial Nerves: No cranial nerve deficit. Psychiatric:         Mood and Affect: Mood normal.       DIFFERENTIAL  DIAGNOSIS     PLAN (LABS / IMAGING / EKG):  Orders Placed This Encounter   Procedures    STREP SCREEN GROUP A THROAT         MEDICATIONS ORDERED:  Orders Placed This Encounter   Medications    DISCONTD: acetaminophen (TYLENOL) tablet 650 mg         DDX: Viral pharyngitis, strep throat    Initial MDM/Plan: 32 y.o. female who presents with sore throat with some associated congestion. Symptoms been ongoing for past few days. Patient healthy at baseline. Patient well-appearing initial evaluation, afebrile, stable vital signs. Does have posterior pharynx erythema with tonsillar swelling bilaterally with some exudates. Will swab for strep. Will provide analgesia. Will reassess. DIAGNOSTIC RESULTS / EMERGENCYDEPARTMENT COURSE / MDM     LABS:  Labs Reviewed   STREP SCREEN GROUP A THROAT         RADIOLOGY:  No results found. EKG  None    All EKG's are interpreted by the Emergency Department Physicianwho either signs or Co-signs this chart in the absence of a cardiologist.    EMERGENCY DEPARTMENT COURSE:  ED Course as of 10/11/22 0035   Mon Oct 10, 2022   1910 Strep A Ag: NEGATIVE [AB]   1915 Patient reevaluated. Updated on negative strep test results. Patient will be discharged at this time. Advised take Motrin and Tylenol as needed for pain. Given strict return precautions including if she develops any worsening symptoms, inability tolerating secretions, any other concerning symptoms. Advise follow-up with primary care physician.   Patient agreed with discharge plan. [AB]      ED Course User Index  [AB] Kevin Daniel DO          PROCEDURES:  None    CONSULTS:  None    CRITICAL CARE:  See attending physician note    FINAL IMPRESSION      1. Viral pharyngitis          DISPOSITION / PLAN     DISPOSITION Decision To Discharge 10/10/2022 07:11:23 PM      PATIENT REFERRED TO:  [facility] ED  [address] [place] [address]  [phone]  Go to   If symptoms worsen    Fernando Richter MD  [address]   [address]  [address]  [phone]    Schedule an appointment as soon as possible for a visit in 1 week      DISCHARGE MEDICATIONS:  Discharge Medication List as of 10/10/2022  7:12 PM          Misti Robertson DO  Emergency Medicine Resident    (Please note that portions of this note were completed with a voice recognition program.Efforts were made to edit the dictations but occasionally words are mis-transcribed.)        Mohit Verdugo DO  Resident  10/11/22 7852

## 2022-10-10 NOTE — ED PROVIDER NOTES
[facility]     Emergency Department     Faculty Attestation    I performed a history and physical examination of the patient and discussed management with the resident. I have reviewed and agree with the residents findings including all diagnostic interpretations, and treatment plans as written at the time of my review. Any areas of disagreement are noted on the chart. I was personally present for the key portions of any procedures. I have documented in the chart those procedures where I was not present during the key portions. For Physician Assistant/ Nurse Practitioner cases/documentation I have personally evaluated this patient and have completed at least one if not all key elements of the E/M (history, physical exam, and MDM). Additional findings are as noted. Patient presents emergency room with complaints of a sore throat. Rapid strep is negative. Airways patent. No drooling      (Please note that portions of this note were completed with a voice recognition program.  Efforts were made to edit the dictations but occasionally words are mis-transcribed.)    Regina Storey.  Monse Ledesma MD, [facility]  Attending Emergency Medicine Physician        Kiarra Ledbetter MD  10/10/22 0152

## 2022-10-11 ASSESSMENT — ENCOUNTER SYMPTOMS
SHORTNESS OF BREATH: 0
ABDOMINAL PAIN: 0
TROUBLE SWALLOWING: 0
EYE DISCHARGE: 0
SORE THROAT: 1

## 2022-10-12 ENCOUNTER — OFFICE VISIT (OUTPATIENT)
Dept: FAMILY MEDICINE CLINIC | Age: 27
End: 2022-10-12
Payer: MEDICARE

## 2022-10-12 VITALS
WEIGHT: 149 LBS | SYSTOLIC BLOOD PRESSURE: 109 MMHG | DIASTOLIC BLOOD PRESSURE: 75 MMHG | BODY MASS INDEX: 27.25 KG/M2 | HEART RATE: 78 BPM

## 2022-10-12 DIAGNOSIS — J35.1 ENLARGED TONSILS: Primary | ICD-10-CM

## 2022-10-12 DIAGNOSIS — Z34.90 PREGNANCY, UNSPECIFIED GESTATIONAL AGE: ICD-10-CM

## 2022-10-12 DIAGNOSIS — Z00.00 ROUTINE ADULT HEALTH MAINTENANCE: ICD-10-CM

## 2022-10-12 DIAGNOSIS — F32.A DEPRESSION, UNSPECIFIED DEPRESSION TYPE: ICD-10-CM

## 2022-10-12 PROCEDURE — 4004F PT TOBACCO SCREEN RCVD TLK: CPT

## 2022-10-12 PROCEDURE — G8484 FLU IMMUNIZE NO ADMIN: HCPCS

## 2022-10-12 PROCEDURE — 99213 OFFICE O/P EST LOW 20 MIN: CPT

## 2022-10-12 PROCEDURE — G8419 CALC BMI OUT NRM PARAM NOF/U: HCPCS

## 2022-10-12 PROCEDURE — G8427 DOCREV CUR MEDS BY ELIG CLIN: HCPCS

## 2022-10-12 RX ORDER — SERTRALINE HYDROCHLORIDE 100 MG/1
100 TABLET, FILM COATED ORAL DAILY
Qty: 30 TABLET | Refills: 1 | Status: SHIPPED | OUTPATIENT
Start: 2022-10-12

## 2022-10-12 RX ORDER — VITAMIN A, VITAMIN C, VITAMIN D-3, VITAMIN E, VITAMIN B-1, VITAMIN B-2, NIACIN, VITAMIN B-6, CALCIUM, IRON, ZINC, COPPER 4000; 120; 400; 22; 1.84; 3; 20; 10; 1; 12; 200; 27; 25; 2 [IU]/1; MG/1; [IU]/1; MG/1; MG/1; MG/1; MG/1; MG/1; MG/1; UG/1; MG/1; MG/1; MG/1; MG/1
1 TABLET ORAL DAILY
Qty: 30 TABLET | Refills: 5 | Status: SHIPPED | OUTPATIENT
Start: 2022-10-12

## 2022-10-12 ASSESSMENT — ENCOUNTER SYMPTOMS
CONSTIPATION: 0
NAUSEA: 0
ABDOMINAL PAIN: 0
DIARRHEA: 0
VOMITING: 0
SHORTNESS OF BREATH: 0

## 2022-10-12 NOTE — LETTER
[address]  [address] [place] [id number]  Phone: [phone]  Fax: [phone]    Sada Reed MD        October 12, 2022     Patient: Caleb Harrison   YOB: 1995   Date of Visit: 10/12/2022       To Whom It May Concern: It is my medical opinion that Tae Kellogg from a primary care standpoint  may return to work. From neurological standpoint, will defer to neurology. If you have any questions or concerns, please don't hesitate to call.     Sincerely,        Sada Reed MD

## 2022-10-12 NOTE — PROGRESS NOTES
Subjective:    Sangeeta Bush is a 32 y.o. female with  has a past medical history of Anxiety, Asthma, Depression, and Seizures ([facility] [address] Presented to the office today for:  Chief Complaint   Patient presents with    Other     Patient is here for pap    Discuss Medications     Patients wants to talk about getting back on anxiety medication       Other  Pertinent negatives include no abdominal pain, chest pain, chills, fever, headaches, nausea or vomiting. Patient is a 71-year-old female with history of absence seizure's presenting to the clinic today for Pap smear. Upon further evaluation patient's last Pap smear was 6 months ago with her last OB, at that time Pap smear was normal.  Advised patient she does not need a repeat Pap smear as current guidelines recommend every 3 years. Patient is also wanting ENT referral as she gets recurrent throat infections. Patient works at a , and wanted a letter allowing her to go back to work. Advised patient that we can give a clearance from a primary care standpoint, but from a neurological standpoint we will have to defer to neurology for that. Patient states that she does not drive, she states her last Seizures Was 2 Weeks Ago. Patient follows up with neurology. Review of Systems   Constitutional:  Negative for chills and fever. Respiratory:  Negative for shortness of breath. Cardiovascular:  Negative for chest pain. Gastrointestinal:  Negative for abdominal pain, constipation, diarrhea, nausea and vomiting. Neurological:  Negative for headaches.                The patient has a   Family History   Problem Relation Age of Onset    Asthma Mother     Depression Mother     High Blood Pressure Mother        Objective:    /75   Pulse 78   Wt 149 lb (67.6 kg)   LMP 09/22/2022 (Exact Date)   BMI 27.25 kg/m²    BP Readings from Last 3 Encounters:   10/12/22 109/75   10/10/22 137/82   08/15/22 (!) 131/92       Physical Exam  Constitutional:       Appearance: Normal appearance. HENT:      Head: Normocephalic and atraumatic. Mouth/Throat:        Comments: Enlarged tonsils   Cardiovascular:      Rate and Rhythm: Normal rate and regular rhythm. Pulses: Normal pulses. Heart sounds: Normal heart sounds. No murmur heard. No friction rub. No gallop. Pulmonary:      Effort: Pulmonary effort is normal. No respiratory distress. Breath sounds: Normal breath sounds. No stridor. No wheezing, rhonchi or rales. Neurological:      Mental Status: She is alert. Lab Results   Component Value Date    WBC 12.6 (H) 06/18/2022    HGB 14.2 06/18/2022    HCT 44.2 06/18/2022     06/18/2022    ALT 29 06/18/2022    AST 27 06/18/2022     06/18/2022    K 3.7 06/18/2022     06/18/2022    CREATININE 0.99 (H) 06/18/2022    BUN 8 06/18/2022    CO2 11 (L) 06/18/2022     Lab Results   Component Value Date    CALCIUM 8.8 06/18/2022     No results found for: LDLCALC, LDLCHOLESTEROL, LDLDIRECT    Assessment and Plan:    1. Depression, unspecified depression type    - sertraline (ZOLOFT) 100 MG tablet; Take 1 tablet by mouth daily  Dispense: 30 tablet; Refill: 1    2. Enlarged tonsils    - AFL - Michelle Wilde MD, Otolaryngology, [place]      3. Routine adult health maintenance    - Prenatal Vit-Fe Fumarate-FA (PRENATAL VITAMIN PLUS LOW IRON) 27-1 MG TABS; Take 1 tablet by mouth daily  Dispense: 30 tablet; Refill: 5    4. Absence Seizures  - Patient follows up with neurolgy  - Last episode was 2 weeks ago, patient does not drive  - Patient on keppra, phenytoin,      Requested Prescriptions     Signed Prescriptions Disp Refills    sertraline (ZOLOFT) 100 MG tablet 30 tablet 1     Sig: Take 1 tablet by mouth daily    Prenatal Vit-Fe Fumarate-FA (PRENATAL VITAMIN PLUS LOW IRON) 27-1 MG TABS 30 tablet 5     Sig: Take 1 tablet by mouth daily       Medications Discontinued During This Encounter   Medication Reason    Prenatal

## 2022-10-13 NOTE — PROGRESS NOTES
Attending Physician Statement  I have discussed the care of Adrienne Crane, 32 y.o. female,including pertinent history and exam findings,  with the resident Dr. Cam Castelan MD.  History:  Chief Complaint   Patient presents with    Other     Patient is here for pap    Discuss Medications     Patients wants to talk about getting back on anxiety medication     Patient reports feeling anxious and depressed for several weeks impacting her ability to perform daily activities. She does not report any suicidal ideation or homicidal ideation present report. I have reviewed the key elements of the encounter with the resident. Examination was done by resident as documented in residents note. BP Readings from Last 3 Encounters:   10/12/22 109/75   10/10/22 137/82   08/15/22 (!) 131/92     /75   Pulse 78   Wt 149 lb (67.6 kg)   LMP 09/22/2022 (Exact Date)   BMI 27.25 kg/m²   Lab Results   Component Value Date    WBC 12.6 (H) 06/18/2022    HGB 14.2 06/18/2022    HCT 44.2 06/18/2022     06/18/2022    ALT 29 06/18/2022    AST 27 06/18/2022     06/18/2022    K 3.7 06/18/2022     06/18/2022    CREATININE 0.99 (H) 06/18/2022    BUN 8 06/18/2022    CO2 11 (L) 06/18/2022     Lab Results   Component Value Date    CALCIUM 8.8 06/18/2022     No results found for: LDLCALC, LDLCHOLESTEROL, LDLDIRECT  I agree with the assessment, plan and diagnosis of    Diagnosis Orders   1. Enlarged tonsils  MINH - Inna Mosley MD, Otolaryngology, [place]      2. Depression, unspecified depression type  sertraline (ZOLOFT) 100 MG tablet      3. Pregnancy, unspecified gestational age        3. Routine adult health maintenance  Prenatal Vit-Fe Fumarate-FA (PRENATAL VITAMIN PLUS LOW IRON) 27-1 MG TABS        I agree with  orders as documented by the resident. Recommendations:   Return in about 3 months (around 1/12/2023) for routine follow up.    (Yoselin Guardado ) Dr. Ольга Marie MD

## 2022-11-21 ENCOUNTER — TELEPHONE (OUTPATIENT)
Dept: FAMILY MEDICINE CLINIC | Age: 27
End: 2022-11-21

## 2022-11-21 NOTE — TELEPHONE ENCOUNTER
patient called in stating that she had a seizure this morning and could not make it to work this morning, and now patient would like for you to writer her a letter to her supervisor stating that she is known to have seizure.

## 2022-12-01 NOTE — TELEPHONE ENCOUNTER
Called patient and left VM to contact office to help her with this matter. I reviewed her chart and she was seen in ED for viral pharyngitis on 10/10/22. There was no mention of recent seizure. She had not follow up appointments scheduled with neurology of our office. I prefer the patient come in for office visit since last seizure was back in 08/222 and only had VV. Last office visit was almost a year ago. Schedule patient with any provider to accommodate schedule.

## 2023-01-04 ENCOUNTER — TELEPHONE (OUTPATIENT)
Dept: FAMILY MEDICINE CLINIC | Age: 28
End: 2023-01-04

## 2023-01-04 DIAGNOSIS — R56.9 SEIZURE (HCC): Primary | ICD-10-CM

## 2023-01-04 RX ORDER — LACOSAMIDE 100 MG/1
100 TABLET ORAL 2 TIMES DAILY
Qty: 60 TABLET | Refills: 0 | Status: SHIPPED | OUTPATIENT
Start: 2023-01-04 | End: 2023-02-03

## 2023-01-04 NOTE — TELEPHONE ENCOUNTER
Patient calling in regards to her VIMPAT stating there is suppose to be a form sent over from pharmacy verifying what she takes due to inurance only wanted to pay for generic, no form found patient was made aware and will call pharmacy back to let them know       Also patient states she has the generic script and it says to take 1 pill a day for a week and then take 2 a day when originally she has been taking it 2 times a day, does she follow the new sig or continue to take 2 daily.  Please advise

## 2023-02-02 DIAGNOSIS — R56.9 SEIZURE (HCC): ICD-10-CM

## 2023-02-02 RX ORDER — LACOSAMIDE 100 MG/1
100 TABLET ORAL 2 TIMES DAILY
Qty: 60 TABLET | Refills: 0 | Status: SHIPPED | OUTPATIENT
Start: 2023-02-02 | End: 2023-03-04

## 2023-02-27 ENCOUNTER — OFFICE VISIT (OUTPATIENT)
Dept: FAMILY MEDICINE CLINIC | Age: 28
End: 2023-02-27
Payer: MEDICAID

## 2023-02-27 VITALS
HEIGHT: 62 IN | WEIGHT: 164 LBS | DIASTOLIC BLOOD PRESSURE: 73 MMHG | HEART RATE: 67 BPM | BODY MASS INDEX: 30.18 KG/M2 | SYSTOLIC BLOOD PRESSURE: 109 MMHG | TEMPERATURE: 98.2 F

## 2023-02-27 DIAGNOSIS — K21.9 GASTROESOPHAGEAL REFLUX DISEASE WITHOUT ESOPHAGITIS: Primary | ICD-10-CM

## 2023-02-27 PROCEDURE — 99213 OFFICE O/P EST LOW 20 MIN: CPT | Performed by: STUDENT IN AN ORGANIZED HEALTH CARE EDUCATION/TRAINING PROGRAM

## 2023-02-27 RX ORDER — OMEPRAZOLE 20 MG/1
20 CAPSULE, DELAYED RELEASE ORAL
Qty: 60 CAPSULE | Refills: 0 | Status: SHIPPED | OUTPATIENT
Start: 2023-02-27

## 2023-02-27 RX ORDER — SIMETHICONE 80 MG
80 TABLET,CHEWABLE ORAL 4 TIMES DAILY PRN
Qty: 180 TABLET | Refills: 3 | Status: SHIPPED | OUTPATIENT
Start: 2023-02-27

## 2023-02-27 ASSESSMENT — ENCOUNTER SYMPTOMS
VOMITING: 0
NAUSEA: 0
COUGH: 0
ABDOMINAL PAIN: 1
DIARRHEA: 0
SORE THROAT: 0
CONSTIPATION: 1
SHORTNESS OF BREATH: 0
EYE DISCHARGE: 0
RHINORRHEA: 0

## 2023-02-27 NOTE — PROGRESS NOTES
Suki Taylor (:  1995) is a 29 y.o. female,Established patient, here for evaluation of the following chief complaint(s): Anxiety (Follow up on anxiety today, still the same ) and Abdominal Cramping (Has been having ABD pain and acid reflex, has had for a month now )         ASSESSMENT/PLAN:  1. Gastroesophageal reflux disease without esophagitis  -     omeprazole (PRILOSEC) 20 MG delayed release capsule; Take 1 capsule by mouth 2 times daily (before meals), Disp-60 capsule, R-0Normal  -     simethicone (MYLICON) 80 MG chewable tablet; Take 1 tablet by mouth 4 times daily as needed for Flatulence, Disp-180 tablet, R-3Normal      Return in about 1 month (around 3/27/2023) for gerd. Subjective   SUBJECTIVE/OBJECTIVE:  HPI  GERD  Patient complains of GERD symptoms of prolonged duration, epigastric pain, and nausea. Symptoms have been present for approximately 1 month. Symptoms include abdominal bloating, belching, and heartburn. The patient denies chest pain, choking on food, cough, deep pressure at base of neck, hematemesis, and hoarseness. Symptoms appear to be worsened by alcohol, fatty foods, and large meals. Risk factors present for GERD include tobacco abuse, alcohol use, obesity, and NSAID use. Risk factors absent for GERD are oral corticosteroid use, previous diagnosis of hiatal hernia, previous diagnosis of peptic ulcer disease, and previous diagnosis of Dunn's esophagus. Studies performed so far include none. Treatments tried so far include lifestyle change including dietary changes, NPO after supper, reduction of alcohol intake, tobacco cessation, weight loss, proton pump inhibitor: . Results of treatment: some improvement in symptom severity. Currently, the symptoms are moderate and occur approximately 3 times per week. Review of Systems   Constitutional:  Negative for appetite change, fatigue and fever. HENT:  Negative for ear pain, rhinorrhea and sore throat.     Eyes: Negative for discharge and visual disturbance. Respiratory:  Negative for cough and shortness of breath. Cardiovascular:  Negative for chest pain and palpitations. Gastrointestinal:  Positive for abdominal pain and constipation. Negative for diarrhea, nausea and vomiting. Endocrine: Negative for polyuria. Genitourinary:  Negative for dysuria. Musculoskeletal:  Negative for arthralgias. Skin:  Negative for rash. Neurological:  Negative for dizziness, weakness, light-headedness and headaches. Psychiatric/Behavioral:  Negative for agitation. Objective   Physical Exam  Vitals reviewed. Constitutional:       General: She is not in acute distress. Appearance: Normal appearance. She is obese. Cardiovascular:      Rate and Rhythm: Normal rate and regular rhythm. Heart sounds: Normal heart sounds. No murmur heard. No friction rub. Pulmonary:      Breath sounds: Normal breath sounds. No wheezing or rales. Abdominal:      General: Bowel sounds are normal. There is distension. Palpations: Abdomen is soft. Tenderness: There is abdominal tenderness. There is no right CVA tenderness, left CVA tenderness, guarding or rebound. Musculoskeletal:         General: No swelling or tenderness. Normal range of motion. Skin:     Findings: No rash. Neurological:      Mental Status: She is alert and oriented to person, place, and time. Psychiatric:         Mood and Affect: Mood normal.                An electronic signature was used to authenticate this note.     --Batsheva Hood MD

## 2023-02-27 NOTE — PATIENT INSTRUCTIONS
Thank you for letting us take care of you today. We hope all your questions were addressed. If a question was overlooked or something else comes to mind after you return home, please contact a member of your Care Team listed below. Your Care Team at [place] [address] is Team #2  Adam Blanchard DO (Faculty)  Diana Alves (Faculty)  William Martino MD (Resident)  William Mak MD (Resident)  Zo Riojas MD (Resident)  Sam Delaney MD (Resident)  Charles Albright., FABI Costa.,  LISA Bolton., BORISN  Lorena Srinivasan, [facility] office)  Nadeen Garcia, [address] (Clinical Practice Manager)  Shelton Garcia [facility] (Clinical Pharmacist)     Office phone number: [phone]    If you need to get in right away due to illness, please be advised we have \"Same Day\" appointments available Monday-Friday. Please call us at [phone] option #3 to schedule your \"Same Day\" appointment.

## 2023-02-27 NOTE — LETTER
[address]  [address]  [address] [place] [address]  Phone: [phone]  Fax: [phone]    Bibi Perdomo MD        February 27, 2023     Patient: Chin Hobbs   YOB: 1995   Date of Visit: 2/27/2023       To Whom It May Concern: It is my medical opinion that Sharren Osler may return to full duty immediately with no restrictions. Please excuse work absence on 02/25/2023 due to medical condition. If you have any questions or concerns, please don't hesitate to call.     Sincerely,        Bibi Perdoom MD

## 2023-02-27 NOTE — PROGRESS NOTES
Visit Information    Have you changed or started any medications since your last visit including any over-the-counter medicines, vitamins, or herbal medicines? no   Have you stopped taking any of your medications? Is so, why? -  no  Are you having any side effects from any of your medications? - no    Have you seen any other physician or provider since your last visit?  no   Have you had any other diagnostic tests since your last visit?  No   Have you been seen in the emergency room and/or had an admission in a hospital since we last saw you?  no   Have you had your routine dental cleaning in the past 6 months?  no     Do you have an active MyChart account? If no, what is the barrier?  Yes    Patient Care Team:  Brown Felix MD as PCP - General (Emergency Medicine)    Medical History Review  Past Medical, Family, and Social History reviewed and does contribute to the patient presenting condition    Health Maintenance   Topic Date Due    COVID-19 Vaccine (1) Never done    Varicella vaccine (1 of 2 - 2-dose childhood series) Never done    Pneumococcal 0-64 years Vaccine (1 - PCV) 02/11/2001    Pap smear  Never done    Flu vaccine (1) Never done    Depression Monitoring  06/20/2023    DTaP/Tdap/Td vaccine (2 - Td or Tdap) 08/17/2027    Hepatitis C screen  Completed    HIV screen  Completed    Hepatitis A vaccine  Aged Out    Hib vaccine  Aged Out    Meningococcal (ACWY) vaccine  Aged Out

## 2023-02-28 NOTE — PROGRESS NOTES
Attending Physician Statement    Wt Readings from Last 3 Encounters:   02/27/23 164 lb (74.4 kg)   10/12/22 149 lb (67.6 kg)   10/10/22 142 lb (64.4 kg)     Temp Readings from Last 3 Encounters:   02/27/23 98.2 °F (36.8 °C) (Oral)   10/10/22 98.8 °F (37.1 °C) (Oral)   08/15/22 97.7 °F (36.5 °C) (Oral)     BP Readings from Last 3 Encounters:   02/27/23 109/73   10/12/22 109/75   10/10/22 137/82     Pulse Readings from Last 3 Encounters:   02/27/23 67   10/12/22 78   10/10/22 85         I have discussed the care of Carolene Lombard, including pertinent history and exam findings with the resident. I have reviewed the key elements of all parts of the encounter with the resident. I agree with the assessment, plan and orders as documented by the resident.   (GE Modifier)

## 2023-03-04 DIAGNOSIS — R56.9 SEIZURE (HCC): ICD-10-CM

## 2023-03-04 DIAGNOSIS — J45.909 UNCOMPLICATED ASTHMA, UNSPECIFIED ASTHMA SEVERITY, UNSPECIFIED WHETHER PERSISTENT: ICD-10-CM

## 2023-03-04 DIAGNOSIS — Z00.00 ROUTINE ADULT HEALTH MAINTENANCE: ICD-10-CM

## 2023-03-04 DIAGNOSIS — G40.909 SEIZURE DISORDER (HCC): ICD-10-CM

## 2023-03-06 NOTE — TELEPHONE ENCOUNTER
E-scribe request for med refills. Please review and e-scribe if applicable.      Last Visit Date:  2/27/23  Next Visit Date:  3/28/2023    No results found for: LABA1C          ( goal A1C is < 7)   No results found for: LABMICR  No results found for: LDLCHOLESTEROL, LDLCALC    (goal LDL is <100)   AST (U/L)   Date Value   06/18/2022 27     ALT (U/L)   Date Value   06/18/2022 29     BUN (mg/dL)   Date Value   06/18/2022 8     BP Readings from Last 3 Encounters:   02/27/23 109/73   10/12/22 109/75   10/10/22 137/82          (goal 120/80)        Patient Active Problem List:     Encounter to establish care     Seizure disorder ([address]     Trying to get pregnant     Otalgia     Acute suppurative otitis media of left ear without spontaneous rupture of tympanic membrane     Breakthrough seizure ([address]     Bacterial vaginosis     Urinary tract infection without hematuria     Seizure ([address]     Localization-related epilepsy ([address]     History of brain surgery     Asthma     Gastroesophageal reflux disease     Focal epilepsy ([address]     Depression     Alcohol use     Hordeolum externum      ----Jose Sellar

## 2023-03-07 ENCOUNTER — TELEPHONE (OUTPATIENT)
Dept: FAMILY MEDICINE CLINIC | Age: 28
End: 2023-03-07

## 2023-03-07 RX ORDER — LACOSAMIDE 100 MG/1
100 TABLET ORAL 2 TIMES DAILY
Qty: 60 TABLET | Refills: 0 | Status: SHIPPED | OUTPATIENT
Start: 2023-03-07 | End: 2023-04-06

## 2023-03-07 RX ORDER — ALBUTEROL SULFATE 90 UG/1
2 AEROSOL, METERED RESPIRATORY (INHALATION) EVERY 6 HOURS PRN
Qty: 1 EACH | Refills: 3 | Status: SHIPPED | OUTPATIENT
Start: 2023-03-07

## 2023-03-07 RX ORDER — VITAMIN A, VITAMIN C, VITAMIN D-3, VITAMIN E, VITAMIN B-1, VITAMIN B-2, NIACIN, VITAMIN B-6, CALCIUM, IRON, ZINC, COPPER 4000; 120; 400; 22; 1.84; 3; 20; 10; 1; 12; 200; 27; 25; 2 [IU]/1; MG/1; [IU]/1; MG/1; MG/1; MG/1; MG/1; MG/1; MG/1; UG/1; MG/1; MG/1; MG/1; MG/1
1 TABLET ORAL DAILY
Qty: 30 TABLET | Refills: 5 | Status: SHIPPED | OUTPATIENT
Start: 2023-03-07

## 2023-03-07 RX ORDER — LEVETIRACETAM 750 MG/1
TABLET ORAL
Qty: 360 TABLET | Refills: 3 | Status: SHIPPED | OUTPATIENT
Start: 2023-03-07

## 2023-03-07 NOTE — TELEPHONE ENCOUNTER
Pt called during lunch hour, spoke to answering service. Per pt she is out of seizure medications and needs them before she goes to work at [employer] today. Pt was last seen on 2/27/23 by Dr. Buenrostro [facility]. Pt also requesting other meds discusses, such as Albuterol sulfate and Prenatal Vit. Pt's pharmacy is [facility] on [address] .  Please follow up with pt at [phone]. Pt will also call office after lunch.   Em

## 2023-03-07 NOTE — TELEPHONE ENCOUNTER
Pt last seen on 2/27/2023, Next appt is 3/28/2023.     Health Maintenance   Topic Date Due    COVID-19 Vaccine (1) Never done    Varicella vaccine (1 of 2 - 2-dose childhood series) Never done    Pneumococcal 0-64 years Vaccine (1 - PCV) 02/11/2001    Pap smear  Never done    Flu vaccine (1) Never done    Depression Monitoring  06/20/2023    DTaP/Tdap/Td vaccine (2 - Td or Tdap) 08/17/2027    Hepatitis C screen  Completed    HIV screen  Completed    Hepatitis A vaccine  Aged Out    Hib vaccine  Aged Out    Meningococcal (ACWY) vaccine  Aged Out       No results found for: LABA1C          ( goal A1C is < 7)   No results found for: LABMICR  No results found for: LDLCHOLESTEROL, [address]    (goal LDL is <100)   AST (U/L)   Date Value   06/18/2022 27     ALT (U/L)   Date Value   06/18/2022 29     BUN (mg/dL)   Date Value   06/18/2022 8     BP Readings from Last 3 Encounters:   02/27/23 109/73   10/12/22 109/75   10/10/22 137/82          (goal 120/80)    All Future Testing planned in [facility]  Lab Frequency Next Occurrence       Next Visit Date:  Future Appointments   Date Time Provider Travis Sadler   3/28/2023  9:30 AM Derrick Jones MD [address] [facility]            Patient Active Problem List:     Encounter to establish care     Seizure disorder ([address]     Trying to get pregnant     Otalgia     Acute suppurative otitis media of left ear without spontaneous rupture of tympanic membrane     Breakthrough seizure ([address]     Bacterial vaginosis     Urinary tract infection without hematuria     Seizure ([address]     Localization-related epilepsy ([address]     History of brain surgery     Asthma     Gastroesophageal reflux disease     Focal epilepsy ([address]     Depression     Alcohol use     Hordeolum externum

## 2023-03-08 NOTE — TELEPHONE ENCOUNTER
PC to pt, she states she picked them up from pharmacy yesterday but thanked me for reaching out before disconnecting.

## 2023-04-17 ENCOUNTER — TELEPHONE (OUTPATIENT)
Dept: FAMILY MEDICINE CLINIC | Age: 28
End: 2023-04-17

## 2023-04-20 ENCOUNTER — OFFICE VISIT (OUTPATIENT)
Dept: FAMILY MEDICINE CLINIC | Age: 28
End: 2023-04-20

## 2023-04-20 VITALS
HEART RATE: 77 BPM | DIASTOLIC BLOOD PRESSURE: 76 MMHG | HEIGHT: 62 IN | BODY MASS INDEX: 29.22 KG/M2 | SYSTOLIC BLOOD PRESSURE: 112 MMHG | WEIGHT: 158.8 LBS

## 2023-04-20 DIAGNOSIS — K21.9 GASTROESOPHAGEAL REFLUX DISEASE WITHOUT ESOPHAGITIS: ICD-10-CM

## 2023-04-20 DIAGNOSIS — R56.9 SEIZURE (HCC): Primary | ICD-10-CM

## 2023-04-20 RX ORDER — LACOSAMIDE 100 MG/1
100 TABLET ORAL 2 TIMES DAILY
Qty: 60 TABLET | Refills: 1 | Status: CANCELLED | OUTPATIENT
Start: 2023-04-20 | End: 2023-05-20

## 2023-04-20 RX ORDER — OMEPRAZOLE 20 MG/1
CAPSULE, DELAYED RELEASE ORAL
Qty: 90 CAPSULE | Refills: 1 | Status: SHIPPED | OUTPATIENT
Start: 2023-04-20

## 2023-04-20 ASSESSMENT — ENCOUNTER SYMPTOMS
SHORTNESS OF BREATH: 0
NAUSEA: 0
DIARRHEA: 0
COUGH: 0
SORE THROAT: 0
ABDOMINAL PAIN: 1
VOMITING: 0
RHINORRHEA: 0
CONSTIPATION: 0
EYE DISCHARGE: 0

## 2023-04-20 NOTE — PROGRESS NOTES
Visit Information    Have you changed or started any medications since your last visit including any over-the-counter medicines, vitamins, or herbal medicines? no   Have you stopped taking any of your medications? Is so, why? -  no  Are you having any side effects from any of your medications? - no    Have you seen any other physician or provider since your last visit?  no   Have you had any other diagnostic tests since your last visit?  no   Have you been seen in the emergency room and/or had an admission in a hospital since we last saw you?  no   Have you had your routine dental cleaning in the past 6 months?  no     Do you have an active MyChart account? If no, what is the barrier?   Yes    Patient Care Team:  Otho Siemens, MD as PCP - General (Emergency Medicine)    Medical History Review  Past Medical, Family, and Social History reviewed and does not contribute to the patient presenting condition    Health Maintenance   Topic Date Due    COVID-19 Vaccine (1) Never done    Varicella vaccine (1 of 2 - 2-dose childhood series) Never done    Pneumococcal 0-64 years Vaccine (1 - PCV) 02/11/2001    Pap smear  Never done    Depression Monitoring  06/20/2023    Flu vaccine (Season Ended) 08/01/2023    DTaP/Tdap/Td vaccine (2 - Td or Tdap) 08/17/2027    Hepatitis C screen  Completed    HIV screen  Completed    Hepatitis A vaccine  Aged Out    Hib vaccine  Aged Out    Meningococcal (ACWY) vaccine  Aged Out    Depression Screen  Discontinued
as well as stop any alcohol use. Return in about 1 month (around 5/20/2023) for GERD.    (Kerline Waldrop ) Dr. Phillip Castillo MD
diagnosis of hiatal hernia, previous diagnosis of peptic ulcer disease, and previous diagnosis of Dunn's esophagus. Studies performed so far include none. Treatments tried so far include lifestyle change including dietary changes, NPO after supper, reduction of alcohol intake, tobacco cessation, weight loss, proton pump inhibitor which has provided moderate relief. Review of Systems   Constitutional:  Negative for appetite change, fatigue and fever. HENT:  Negative for ear pain, rhinorrhea and sore throat. Eyes:  Negative for discharge and visual disturbance. Respiratory:  Negative for cough and shortness of breath. Cardiovascular:  Negative for chest pain and palpitations. Gastrointestinal:  Positive for abdominal pain. Negative for constipation, diarrhea, nausea and vomiting. Endocrine: Negative for polyuria. Genitourinary:  Negative for dysuria. Musculoskeletal:  Negative for arthralgias. Skin:  Negative for rash. Neurological:  Positive for seizures. Negative for dizziness, weakness, light-headedness and headaches. Psychiatric/Behavioral:  Negative for agitation. Objective   Physical Exam  Vitals reviewed. Constitutional:       General: She is not in acute distress. Appearance: Normal appearance. She is obese. Cardiovascular:      Rate and Rhythm: Normal rate and regular rhythm. Heart sounds: Normal heart sounds. No murmur heard. No friction rub. Pulmonary:      Breath sounds: Normal breath sounds. No wheezing or rales. Abdominal:      General: Bowel sounds are normal.      Palpations: Abdomen is soft. Tenderness: There is no abdominal tenderness. There is no guarding or rebound. Musculoskeletal:         General: No swelling or tenderness. Normal range of motion. Right lower leg: No edema. Left lower leg: No edema. Skin:     Findings: No rash.    Neurological:      Mental Status: She is alert and oriented to person, place, and

## 2023-04-20 NOTE — PATIENT INSTRUCTIONS
Thank you for letting us take care of you today. We hope all your questions were addressed. If a question was overlooked or something else comes to mind after you return home, please contact a member of your Care Team listed below. Your Care Team at [place] [address] is Team #2  Maureen Daniels DO (Faculty)  Joanie Baker (Faculty)  Liz Hernandez MD (Resident)  Carolina Tillman MD (Resident)  Ana Perez MD (Resident)  Alison Mack MD (Resident)  Joan Winters., FABI Stevenson.,  LISA Butler., HARRIET Solis., Marielle Conteh [facility] office)  Sandra Floyd, [address] (Clinical Practice Manager)  Braydon Hampton [facility] (Clinical Pharmacist)     Office phone number: [phone]    If you need to get in right away due to illness, please be advised we have \"Same Day\" appointments available Monday-Friday. Please call us at [phone] option #3 to schedule your \"Same Day\" appointment.

## 2023-10-05 ENCOUNTER — TELEPHONE (OUTPATIENT)
Dept: NEUROLOGY | Age: 28
End: 2023-10-05

## 2023-10-05 NOTE — TELEPHONE ENCOUNTER
10 05 2023 I called the patient times 2 (09 25 2023 unable to leave message and 10 05 2023 LMOM at [phone]) to schedule new patient appointment with one of our providers, no response. I mailed the patient a letter asking them to call the office back to schedule this appointment.   KS

## 2024-01-12 ENCOUNTER — TELEPHONE (OUTPATIENT)
Dept: NEUROLOGY | Age: 29
End: 2024-01-12

## 2024-01-12 NOTE — TELEPHONE ENCOUNTER
01 11 2024 LMOM x 2 for the patient to call the office back to reschedule this appointment. 01 12 2023 patient number was busy x 3 and LMOM on mother's phone. KS 01 12 2024 cancelled by provider and letter sent. KS

## 2024-03-04 ENCOUNTER — OFFICE VISIT (OUTPATIENT)
Dept: FAMILY MEDICINE CLINIC | Age: 29
End: 2024-03-04
Payer: MEDICAID

## 2024-03-04 VITALS
HEART RATE: 98 BPM | WEIGHT: 151.4 LBS | SYSTOLIC BLOOD PRESSURE: 109 MMHG | BODY MASS INDEX: 27.86 KG/M2 | DIASTOLIC BLOOD PRESSURE: 66 MMHG | OXYGEN SATURATION: 97 % | HEIGHT: 62 IN

## 2024-03-04 DIAGNOSIS — J30.9 ALLERGIC RHINITIS, UNSPECIFIED SEASONALITY, UNSPECIFIED TRIGGER: ICD-10-CM

## 2024-03-04 DIAGNOSIS — Z00.00 ROUTINE ADULT HEALTH MAINTENANCE: ICD-10-CM

## 2024-03-04 DIAGNOSIS — J45.909 UNCOMPLICATED ASTHMA, UNSPECIFIED ASTHMA SEVERITY, UNSPECIFIED WHETHER PERSISTENT: ICD-10-CM

## 2024-03-04 DIAGNOSIS — R56.9 SEIZURE (HCC): Primary | ICD-10-CM

## 2024-03-04 PROCEDURE — 99213 OFFICE O/P EST LOW 20 MIN: CPT

## 2024-03-04 RX ORDER — VITAMIN A, VITAMIN C, VITAMIN D-3, VITAMIN E, VITAMIN B-1, VITAMIN B-2, NIACIN, VITAMIN B-6, CALCIUM, IRON, ZINC, COPPER 4000; 120; 400; 22; 1.84; 3; 20; 10; 1; 12; 200; 27; 25; 2 [IU]/1; MG/1; [IU]/1; MG/1; MG/1; MG/1; MG/1; MG/1; MG/1; UG/1; MG/1; MG/1; MG/1; MG/1
1 TABLET ORAL DAILY
Qty: 30 TABLET | Refills: 5 | Status: SHIPPED | OUTPATIENT
Start: 2024-03-04

## 2024-03-04 RX ORDER — ALBUTEROL SULFATE 90 UG/1
2 AEROSOL, METERED RESPIRATORY (INHALATION) EVERY 6 HOURS PRN
Qty: 1 EACH | Refills: 3 | Status: SHIPPED | OUTPATIENT
Start: 2024-03-04

## 2024-03-04 RX ORDER — LACOSAMIDE 50 MG/1
100 TABLET ORAL 2 TIMES DAILY
Qty: 60 TABLET | Refills: 1 | Status: SHIPPED | OUTPATIENT
Start: 2024-03-04 | End: 2024-04-03

## 2024-03-04 RX ORDER — LEVETIRACETAM 750 MG/1
1500 TABLET ORAL 2 TIMES DAILY
Qty: 360 TABLET | Refills: 3 | Status: SHIPPED | OUTPATIENT
Start: 2024-03-04

## 2024-03-04 RX ORDER — CETIRIZINE HYDROCHLORIDE 10 MG/1
10 TABLET ORAL DAILY
Qty: 30 TABLET | Refills: 1 | Status: SHIPPED | OUTPATIENT
Start: 2024-03-04

## 2024-03-04 SDOH — ECONOMIC STABILITY: FOOD INSECURITY: WITHIN THE PAST 12 MONTHS, THE FOOD YOU BOUGHT JUST DIDN'T LAST AND YOU DIDN'T HAVE MONEY TO GET MORE.: NEVER TRUE

## 2024-03-04 SDOH — ECONOMIC STABILITY: FOOD INSECURITY: WITHIN THE PAST 12 MONTHS, YOU WORRIED THAT YOUR FOOD WOULD RUN OUT BEFORE YOU GOT MONEY TO BUY MORE.: NEVER TRUE

## 2024-03-04 SDOH — ECONOMIC STABILITY: INCOME INSECURITY: HOW HARD IS IT FOR YOU TO PAY FOR THE VERY BASICS LIKE FOOD, HOUSING, MEDICAL CARE, AND HEATING?: SOMEWHAT HARD

## 2024-03-04 SDOH — ECONOMIC STABILITY: HOUSING INSECURITY
IN THE LAST 12 MONTHS, WAS THERE A TIME WHEN YOU DID NOT HAVE A STEADY PLACE TO SLEEP OR SLEPT IN A SHELTER (INCLUDING NOW)?: NO

## 2024-03-04 ASSESSMENT — PATIENT HEALTH QUESTIONNAIRE - PHQ9
5. POOR APPETITE OR OVEREATING: 0
SUM OF ALL RESPONSES TO PHQ9 QUESTIONS 1 & 2: 2
6. FEELING BAD ABOUT YOURSELF - OR THAT YOU ARE A FAILURE OR HAVE LET YOURSELF OR YOUR FAMILY DOWN: 1
SUM OF ALL RESPONSES TO PHQ QUESTIONS 1-9: 4
SUM OF ALL RESPONSES TO PHQ QUESTIONS 1-9: 4
9. THOUGHTS THAT YOU WOULD BE BETTER OFF DEAD, OR OF HURTING YOURSELF: 0
8. MOVING OR SPEAKING SO SLOWLY THAT OTHER PEOPLE COULD HAVE NOTICED. OR THE OPPOSITE, BEING SO FIGETY OR RESTLESS THAT YOU HAVE BEEN MOVING AROUND A LOT MORE THAN USUAL: 0
SUM OF ALL RESPONSES TO PHQ QUESTIONS 1-9: 4
10. IF YOU CHECKED OFF ANY PROBLEMS, HOW DIFFICULT HAVE THESE PROBLEMS MADE IT FOR YOU TO DO YOUR WORK, TAKE CARE OF THINGS AT HOME, OR GET ALONG WITH OTHER PEOPLE: 1
SUM OF ALL RESPONSES TO PHQ QUESTIONS 1-9: 4
1. LITTLE INTEREST OR PLEASURE IN DOING THINGS: 0
2. FEELING DOWN, DEPRESSED OR HOPELESS: 2
4. FEELING TIRED OR HAVING LITTLE ENERGY: 0
7. TROUBLE CONCENTRATING ON THINGS, SUCH AS READING THE NEWSPAPER OR WATCHING TELEVISION: 0
3. TROUBLE FALLING OR STAYING ASLEEP: 1

## 2024-03-04 ASSESSMENT — ENCOUNTER SYMPTOMS
SHORTNESS OF BREATH: 0
COUGH: 0
VOMITING: 0
ABDOMINAL PAIN: 0
BACK PAIN: 0
CHEST TIGHTNESS: 0
NAUSEA: 0
COLOR CHANGE: 0

## 2024-03-04 NOTE — PROGRESS NOTES
Subjective:    Livia Ferrer is a 29 y.o. female with  has a past medical history of Anxiety, Asthma, Depression, and Seizures ([facility]).    Presented to the office today for:  Chief Complaint   Patient presents with    Medication Refill     Patient is in need of med refill        Medication Refill  Pertinent negatives include no abdominal pain, arthralgias, chest pain, coughing, fatigue, headaches, nausea, rash, vomiting or weakness.       29 year-old female with past medical history of localization epilepsy status post temporal lobectomy with frequent GTCS and absence seizure's in the past presented to clinic with concerns of waking up with confusion.  As per the patient, she was taking Vimpat and Keppra for the epilepsy.  Since her neurologist changed the practice, she was out of Vimpat for last 6-month.  For about a month, previously on alternate day she used to wake up confused and in the morning, she did not even remember where she was or what with the medications she was supposed to take in the morning.  She double confirmed with her boyfriend and he said that there was no abnormal body movement or convulsive episode.  She remembers that previously when she was diagnosed with absence seizure she used to be confused afterwards having the staring episode and most of those episodes that she is currently having relates to previous absence seizure.  She has not seen her neurologist since last 2 years and was trying to schedule an appointment with a new practice.    Review of Systems   Constitutional:  Negative for fatigue.   Respiratory:  Negative for cough, chest tightness and shortness of breath.    Cardiovascular:  Negative for chest pain, palpitations and leg swelling.   Gastrointestinal:  Negative for abdominal pain, nausea and vomiting.   Genitourinary:  Negative for dysuria and urgency.   Musculoskeletal:  Negative for arthralgias and back pain.   Skin:  Negative for color change and rash.

## 2024-03-04 NOTE — PROGRESS NOTES
Attending Physician Statement  I  have discussed the care of Livia Ferrer including pertinent history and exam findings with the resident. I agree with the assessment, plan and orders as documented by the resident.      /66 (Site: Left Upper Arm, Position: Sitting, Cuff Size: Medium Adult) Comment: machine  Pulse 98   Ht 1.575 m (5' 2\")   Wt 68.7 kg (151 lb 6.4 oz)   LMP 02/29/2024   SpO2 97%   BMI 27.69 kg/m²    BP Readings from Last 3 Encounters:   03/04/24 109/66   04/20/23 112/76   02/27/23 109/73     Wt Readings from Last 3 Encounters:   03/04/24 68.7 kg (151 lb 6.4 oz)   04/20/23 72 kg (158 lb 12.8 oz)   02/27/23 74.4 kg (164 lb)     Hx of seizures   Patient was taking Vimpat and Keppra   Patient c/o confusion in the morning and possibly may be having absence seizures  Patient hasn't been taking her meds  Will refer to neuro  Will restart on meds  Will add 50 mg BID and in a month can potentially increase the dose in case she hasn't seen the neurologist        Diagnosis Orders   1. Seizure (HCC)  levETIRAcetam (KEPPRA) 750 MG tablet    Rg Matthews MD, Neurology, [facility]    lacosamide (VIMPAT) 50 MG TABS tablet      2. Allergic rhinitis, unspecified seasonality, unspecified trigger  cetirizine (ZYRTEC ALLERGY) 10 MG tablet      3. Uncomplicated asthma, unspecified asthma severity, unspecified whether persistent  albuterol sulfate HFA (VENTOLIN HFA) 108 (90 Base) MCG/ACT inhaler      4. Routine adult health maintenance  Prenatal Vit-Fe Fumarate-FA (PRENATAL VITAMIN PLUS LOW IRON) 27-1 MG TABS              Doris Johnson MD 3/4/2024 3:50 PM

## 2024-03-04 NOTE — PROGRESS NOTES
Visit Information    Have you changed or started any medications since your last visit including any over-the-counter medicines, vitamins, or herbal medicines? no   Have you stopped taking any of your medications? Is so, why? -  no  Are you having any side effects from any of your medications? - no    Have you seen any other physician or provider since your last visit?  no   Have you had any other diagnostic tests since your last visit?  no   Have you been seen in the emergency room and/or had an admission in a hospital since we last saw you?  no   Have you had your routine dental cleaning in the past 6 months?  no     Do you have an active MyChart account? If no, what is the barrier?  Yes    Patient Care Team:  Brown Felix MD as PCP - General (Emergency Medicine)    Medical History Review  Past Medical, Family, and Social History reviewed and does not contribute to the patient presenting condition    Health Maintenance   Topic Date Due    Hepatitis B vaccine (1 of 3 - 3-dose series) Never done    COVID-19 Vaccine (1) Never done    Varicella vaccine (1 of 2 - 2-dose childhood series) Never done    Pneumococcal 0-64 years Vaccine (1 - PCV) Never done    Pap smear  Never done    Depression Monitoring  06/20/2023    Flu vaccine (1) Never done    DTaP/Tdap/Td vaccine (2 - Td or Tdap) 08/17/2027    Hepatitis C screen  Completed    HIV screen  Completed    Hepatitis A vaccine  Aged Out    Hib vaccine  Aged Out    HPV vaccine  Aged Out    Polio vaccine  Aged Out    Meningococcal (ACWY) vaccine  Aged Out    Depression Screen  Discontinued

## 2024-03-04 NOTE — PATIENT INSTRUCTIONS
Thank you for letting us take care of you today. We hope all your questions were addressed. If a question was overlooked or something else comes to mind after you return home, please contact a member of your Care Team listed below.      Your Care Team at [facility] is Team #1  Geovanna Nova, Faculty Advisor  Hernando Mayorga, Resident Physician  Anna Marie Zelaya, Resident Physician  Yareli Ochoa, Resident Physician  Valeria Chavira, [facility]  Lilly Kramer, [facility]  Orlin Horton, [facility]  Patricia Terrell, [facility]  Edith Pina, [facility]  Ariane Hawley, [facility]  Juanita Gonsalez, [facility]  Gloria Singh, [facility]  Anne (LJ) Johny,   Latesha Ewing [facility] (Clinical Pharmacist)     Office phone number: [phone]    If you need to get in right away due to illness, please be advised we have \"Same Day\" appointments available Monday-Friday. Please call us at [phone] option #3 to schedule your \"Same Day\" appointment.

## 2024-03-15 ENCOUNTER — TELEPHONE (OUTPATIENT)
Dept: FAMILY MEDICINE CLINIC | Age: 29
End: 2024-03-15

## 2024-03-15 NOTE — TELEPHONE ENCOUNTER
We missed the opportunity to provide you with the resources you requested at your last visit. I have put an updated after visit summary in the mail to you. Again, we apologize for missing this at your last visit. Please reach out to us at [phone] if you have any questions.

## 2024-08-31 DIAGNOSIS — R56.9 SEIZURE (HCC): ICD-10-CM

## 2024-08-31 DIAGNOSIS — K21.9 GASTROESOPHAGEAL REFLUX DISEASE WITHOUT ESOPHAGITIS: ICD-10-CM

## 2024-08-31 DIAGNOSIS — J30.9 ALLERGIC RHINITIS, UNSPECIFIED SEASONALITY, UNSPECIFIED TRIGGER: ICD-10-CM

## 2024-08-31 DIAGNOSIS — Z00.00 ROUTINE ADULT HEALTH MAINTENANCE: ICD-10-CM

## 2024-08-31 DIAGNOSIS — J45.909 UNCOMPLICATED ASTHMA, UNSPECIFIED ASTHMA SEVERITY, UNSPECIFIED WHETHER PERSISTENT: ICD-10-CM

## 2024-08-31 RX ORDER — LEVETIRACETAM 750 MG/1
1500 TABLET ORAL 2 TIMES DAILY
Qty: 360 TABLET | Refills: 3 | Status: SHIPPED | OUTPATIENT
Start: 2024-08-31

## 2024-08-31 RX ORDER — CETIRIZINE HYDROCHLORIDE 10 MG/1
10 TABLET ORAL DAILY
Qty: 30 TABLET | Refills: 1 | Status: SHIPPED | OUTPATIENT
Start: 2024-08-31

## 2024-08-31 RX ORDER — LACOSAMIDE 50 MG/1
100 TABLET ORAL 2 TIMES DAILY
Qty: 60 TABLET | Refills: 1 | Status: SHIPPED | OUTPATIENT
Start: 2024-08-31 | End: 2024-09-30

## 2024-08-31 RX ORDER — VITAMIN A, VITAMIN C, VITAMIN D-3, VITAMIN E, VITAMIN B-1, VITAMIN B-2, NIACIN, VITAMIN B-6, CALCIUM, IRON, ZINC, COPPER 4000; 120; 400; 22; 1.84; 3; 20; 10; 1; 12; 200; 27; 25; 2 [IU]/1; MG/1; [IU]/1; MG/1; MG/1; MG/1; MG/1; MG/1; MG/1; UG/1; MG/1; MG/1; MG/1; MG/1
1 TABLET ORAL DAILY
Qty: 30 TABLET | Refills: 5 | Status: SHIPPED | OUTPATIENT
Start: 2024-08-31

## 2024-08-31 RX ORDER — ALBUTEROL SULFATE 90 UG/1
2 AEROSOL, METERED RESPIRATORY (INHALATION) EVERY 6 HOURS PRN
Qty: 1 EACH | Refills: 3 | Status: SHIPPED | OUTPATIENT
Start: 2024-08-31

## 2024-09-06 ENCOUNTER — APPOINTMENT (OUTPATIENT)
Dept: CT IMAGING | Age: 29
End: 2024-09-06
Payer: MEDICAID

## 2024-09-06 ENCOUNTER — HOSPITAL ENCOUNTER (EMERGENCY)
Age: 29
Discharge: HOME | End: 2024-09-06
Attending: EMERGENCY MEDICINE
Payer: MEDICAID

## 2024-09-06 VITALS
DIASTOLIC BLOOD PRESSURE: 81 MMHG | BODY MASS INDEX: 27.42 KG/M2 | SYSTOLIC BLOOD PRESSURE: 122 MMHG | TEMPERATURE: 98.8 F | WEIGHT: 149 LBS | OXYGEN SATURATION: 100 % | HEART RATE: 75 BPM | RESPIRATION RATE: 18 BRPM | HEIGHT: 62 IN

## 2024-09-06 DIAGNOSIS — S01.81XA LACERATION OF FOREHEAD, INITIAL ENCOUNTER: Primary | ICD-10-CM

## 2024-09-06 DIAGNOSIS — G40.919 BREAKTHROUGH SEIZURE (HCC): ICD-10-CM

## 2024-09-06 LAB
ANION GAP SERPL CALCULATED.3IONS-SCNC: 11 MMOL/L (ref 9–17)
BASOPHILS # BLD: 0.04 K/UL (ref 0–0.2)
BASOPHILS NFR BLD: 1 % (ref 0–2)
BUN SERPL-MCNC: 9 MG/DL (ref 6–20)
BUN/CREAT SERPL: 10 (ref 9–20)
CALCIUM SERPL-MCNC: 8.4 MG/DL (ref 8.6–10.4)
CHLORIDE SERPL-SCNC: 105 MMOL/L (ref 98–107)
CO2 SERPL-SCNC: 21 MMOL/L (ref 20–31)
CREAT SERPL-MCNC: 0.9 MG/DL (ref 0.5–0.9)
EOSINOPHIL # BLD: 0.09 K/UL (ref 0–0.44)
EOSINOPHILS RELATIVE PERCENT: 1 % (ref 1–4)
ERYTHROCYTE [DISTWIDTH] IN BLOOD BY AUTOMATED COUNT: 12.3 % (ref 11.8–14.4)
GFR, ESTIMATED: 89 ML/MIN/1.73M2
GLUCOSE SERPL-MCNC: 101 MG/DL (ref 70–99)
HCT VFR BLD AUTO: 39.9 % (ref 36.3–47.1)
HGB BLD-MCNC: 13.4 G/DL (ref 11.9–15.1)
IMM GRANULOCYTES # BLD AUTO: 0.03 K/UL (ref 0–0.3)
IMM GRANULOCYTES NFR BLD: 0 %
LEVETIRACETAM SERPL-MCNC: 82 UG/ML
LYMPHOCYTES NFR BLD: 1.58 K/UL (ref 1.1–3.7)
LYMPHOCYTES RELATIVE PERCENT: 21 % (ref 24–43)
MCH RBC QN AUTO: 32.8 PG (ref 25.2–33.5)
MCHC RBC AUTO-ENTMCNC: 33.6 G/DL (ref 28.4–34.8)
MCV RBC AUTO: 97.8 FL (ref 82.6–102.9)
MONOCYTES NFR BLD: 0.41 K/UL (ref 0.1–1.2)
MONOCYTES NFR BLD: 5 % (ref 3–12)
NEUTROPHILS NFR BLD: 72 % (ref 36–65)
NEUTS SEG NFR BLD: 5.57 K/UL (ref 1.5–8.1)
NRBC BLD-RTO: 0 PER 100 WBC
PLATELET # BLD AUTO: 205 K/UL (ref 138–453)
PMV BLD AUTO: 9.9 FL (ref 8.1–13.5)
POTASSIUM SERPL-SCNC: 4.1 MMOL/L (ref 3.7–5.3)
RBC # BLD AUTO: 4.08 M/UL (ref 3.95–5.11)
SODIUM SERPL-SCNC: 137 MMOL/L (ref 135–144)
WBC OTHER # BLD: 7.7 K/UL (ref 3.5–11.3)

## 2024-09-06 PROCEDURE — 80048 BASIC METABOLIC PNL TOTAL CA: CPT

## 2024-09-06 PROCEDURE — 12011 RPR F/E/E/N/L/M 2.5 CM/<: CPT

## 2024-09-06 PROCEDURE — 80235 DRUG ASSAY LACOSAMIDE: CPT

## 2024-09-06 PROCEDURE — 70450 CT HEAD/BRAIN W/O DYE: CPT

## 2024-09-06 PROCEDURE — 6360000002 HC RX W HCPCS: Performed by: EMERGENCY MEDICINE

## 2024-09-06 PROCEDURE — 2500000003 HC RX 250 WO HCPCS: Performed by: EMERGENCY MEDICINE

## 2024-09-06 PROCEDURE — 99284 EMERGENCY DEPT VISIT MOD MDM: CPT

## 2024-09-06 PROCEDURE — 96374 THER/PROPH/DIAG INJ IV PUSH: CPT

## 2024-09-06 PROCEDURE — 2580000003 HC RX 258: Performed by: EMERGENCY MEDICINE

## 2024-09-06 PROCEDURE — 85025 COMPLETE CBC W/AUTO DIFF WBC: CPT

## 2024-09-06 PROCEDURE — 80177 DRUG SCRN QUAN LEVETIRACETAM: CPT

## 2024-09-06 RX ORDER — BUPIVACAINE HYDROCHLORIDE AND EPINEPHRINE 2.5; 5 MG/ML; UG/ML
10 INJECTION, SOLUTION EPIDURAL; INFILTRATION; INTRACAUDAL; PERINEURAL ONCE
Status: COMPLETED | OUTPATIENT
Start: 2024-09-06 | End: 2024-09-06

## 2024-09-06 RX ORDER — 0.9 % SODIUM CHLORIDE 0.9 %
1000 INTRAVENOUS SOLUTION INTRAVENOUS ONCE
Status: COMPLETED | OUTPATIENT
Start: 2024-09-06 | End: 2024-09-06

## 2024-09-06 RX ORDER — LEVETIRACETAM 500 MG/5ML
1000 INJECTION, SOLUTION, CONCENTRATE INTRAVENOUS ONCE
Status: COMPLETED | OUTPATIENT
Start: 2024-09-06 | End: 2024-09-06

## 2024-09-06 RX ORDER — ONDANSETRON 2 MG/ML
4 INJECTION INTRAMUSCULAR; INTRAVENOUS ONCE
Status: DISCONTINUED | OUTPATIENT
Start: 2024-09-06 | End: 2024-09-06

## 2024-09-06 RX ADMIN — SODIUM CHLORIDE 1000 ML: 9 INJECTION, SOLUTION INTRAVENOUS at 07:32

## 2024-09-06 RX ADMIN — BUPIVACAINE HYDROCHLORIDE AND EPINEPHRINE BITARTRATE 10 ML: 2.5; .005 INJECTION, SOLUTION EPIDURAL; INFILTRATION; INTRACAUDAL; PERINEURAL at 10:00

## 2024-09-06 RX ADMIN — LEVETIRACETAM 1000 MG: 100 INJECTION INTRAVENOUS at 07:34

## 2024-09-06 ASSESSMENT — PAIN SCALES - GENERAL: PAINLEVEL_OUTOF10: 10

## 2024-09-06 ASSESSMENT — PAIN - FUNCTIONAL ASSESSMENT: PAIN_FUNCTIONAL_ASSESSMENT: 0-10

## 2024-09-06 NOTE — ED PROVIDER NOTES
EMERGENCY DEPARTMENT ENCOUNTER    Pt Name: Livia Ferrer  MRN: [id number]  Birthdate 1995  Date of evaluation: 9/6/24  CHIEF COMPLAINT       Chief Complaint   Patient presents with    Laceration     PT states she woke up this Am with a laceration to her forehead. Pt states she is unsure what happened. Pt states hs of tonic/Clonic and absent seizures. Pt takes Keppra and Vimpat.     HISTORY OF PRESENT ILLNESS   The history is provided by the patient and medical records.  The patient is a 29-year-old female who is brought in by ambulance for likely seizure activity and forehead laceration.  Patient woke up on the couch this morning with loss of control of her bowels and a laceration on her forehead.  She believes she had a seizure.  Last seizure activity was 3 months ago.  She reports absence and focal seizures.  She takes lacosamide and Keppra.  She reports compliance with her medications.  She denies illicit drug use.  Otherwise feeling well.    REVIEW OF SYSTEMS     Review of Systems  All other systems reviewed and are negative.    PASTMEDICAL HISTORY     Past Medical History:   Diagnosis Date    Anxiety     Asthma 7/12/2019    Depression 2/25/2022    Seizures ([facility])      Past Problem List  Patient Active Problem List   Diagnosis Code    Encounter to establish care Z76.89    Seizure disorder ([facility]) G40.909    Trying to get pregnant Z78.9    Otalgia H92.09    Acute suppurative otitis media of left ear without spontaneous rupture of tympanic membrane H66.002    Breakthrough seizure ([facility]) G40.919    Bacterial vaginosis N76.0, B96.89    Urinary tract infection without hematuria N39.0    Seizure ([facility]) R56.9    Localization-related epilepsy ([facility]) G40.109    History of brain surgery Z98.890    Asthma J45.909    Gastroesophageal reflux disease K21.9    Focal epilepsy ([facility]) G40.109    Depression F32.A    Alcohol use Z78.9    Hordeolum externum H00.019     SURGICAL HISTORY       Past Surgical History:   Procedure

## 2024-09-06 NOTE — ED NOTES
Pt presents to ER from home by EMS due to Laceration. EMS states patient is unsure how laceration occurred Pt woke up this AM covered in blood. Hematoma Noted.PT states hx of Tonic/Clonic and Absent Seizures. Pt states she is complaint with her medication.Pt is A/O x 4, equal chest expansion with non labored breathing, wheels locked, bed in lowest position, call light in reach.

## 2024-09-06 NOTE — DISCHARGE INSTRUCTIONS
No driving.  No work involving moving machinery, risk of fire, water and falls from heights greater than 5 feet.    Return to the ED in 3 to 5 days to have your sutures removed.    Return to this emergency room immediately if your symptoms persist, worsen or if new ones form.    Make sure you follow-up with your primary care doctor and your neurologist within the next 1-2 business days.

## 2024-09-10 LAB — LACOSAMIDE: <0.5 UG/ML (ref 1–10)

## 2024-09-26 DIAGNOSIS — R56.9 SEIZURE (HCC): ICD-10-CM

## 2024-09-26 RX ORDER — LEVETIRACETAM 750 MG/1
1500 TABLET ORAL 2 TIMES DAILY
Qty: 360 TABLET | Refills: 3 | Status: SHIPPED | OUTPATIENT
Start: 2024-09-26

## 2024-09-26 RX ORDER — LACOSAMIDE 50 MG/1
100 TABLET ORAL 2 TIMES DAILY
Qty: 60 TABLET | Refills: 1 | Status: SHIPPED | OUTPATIENT
Start: 2024-09-26 | End: 2024-10-26

## 2024-10-29 DIAGNOSIS — J30.9 ALLERGIC RHINITIS, UNSPECIFIED SEASONALITY, UNSPECIFIED TRIGGER: ICD-10-CM

## 2024-10-30 RX ORDER — CETIRIZINE HYDROCHLORIDE 10 MG/1
10 TABLET ORAL DAILY
Qty: 30 TABLET | Refills: 1 | OUTPATIENT
Start: 2024-10-30

## 2024-11-29 DIAGNOSIS — R56.9 SEIZURE (HCC): ICD-10-CM

## 2024-11-30 ENCOUNTER — HOSPITAL ENCOUNTER (EMERGENCY)
Age: 29
Discharge: HOME | End: 2024-11-30
Attending: EMERGENCY MEDICINE
Payer: MEDICAID

## 2024-11-30 VITALS
RESPIRATION RATE: 18 BRPM | TEMPERATURE: 96.8 F | OXYGEN SATURATION: 100 % | SYSTOLIC BLOOD PRESSURE: 112 MMHG | DIASTOLIC BLOOD PRESSURE: 66 MMHG | HEART RATE: 64 BPM

## 2024-11-30 DIAGNOSIS — Z76.0 ENCOUNTER FOR MEDICATION REFILL: Primary | ICD-10-CM

## 2024-11-30 DIAGNOSIS — R56.9 SEIZURE (HCC): ICD-10-CM

## 2024-11-30 PROCEDURE — 6370000000 HC RX 637 (ALT 250 FOR IP): Performed by: EMERGENCY MEDICINE

## 2024-11-30 PROCEDURE — 99283 EMERGENCY DEPT VISIT LOW MDM: CPT

## 2024-11-30 RX ORDER — LEVETIRACETAM 750 MG/1
750 TABLET ORAL 2 TIMES DAILY
Qty: 6 TABLET | Refills: 0 | Status: SHIPPED | OUTPATIENT
Start: 2024-11-30 | End: 2024-12-03

## 2024-11-30 RX ORDER — LEVETIRACETAM 500 MG/1
750 TABLET ORAL ONCE
Status: COMPLETED | OUTPATIENT
Start: 2024-11-30 | End: 2024-11-30

## 2024-11-30 RX ADMIN — LEVETIRACETAM 750 MG: 500 TABLET, FILM COATED ORAL at 18:42

## 2024-11-30 ASSESSMENT — ENCOUNTER SYMPTOMS
SHORTNESS OF BREATH: 0
ABDOMINAL PAIN: 0

## 2024-11-30 ASSESSMENT — PAIN - FUNCTIONAL ASSESSMENT: PAIN_FUNCTIONAL_ASSESSMENT: NONE - DENIES PAIN

## 2024-11-30 NOTE — ED NOTES
Pt to ed for medication refill.   Pt states she takes keppra, has a script at the pharmacy to picked up but she did not make it to the pharmacy in time and they are closed until Monday.   Pt missed her first dose today and needs a refill to get her through the next few days until she can  her scrip. Pt denies any seizures or complaints.   Pt is alert, oriented, speaking in full, complete sentences.

## 2024-11-30 NOTE — DISCHARGE INSTRUCTIONS
You were seen here because you needed a medication refilled.  We did give you a refill of your Keppra medication that will last a couple of days.  Our pharmacy is open tomorrow between 9 AM and 3 PM.  Given your Keppra is a twice a day medication, it is recommended that you come at 9 AM when the pharmacy opens that we can get the medication refilled and take it in the morning and evening as it is prescribed.  It is recommended that you still obtain the normal prescription from your doctor that you stated you are able to  on Monday as this Keppra is only going to last a couple of days.    You need to call and schedule follow-up appointment with your primary care doctor for soon as possible.    Return to the emergency department immediately if you experience worsening symptoms, develop any other symptoms, or if you have any other concerns

## 2024-11-30 NOTE — ED PROVIDER NOTES
[facility] ED  Emergency Department Encounter  Emergency Medicine Resident     Pt Name:Livia Ferrer  MRN: [id number]  Birthdate 1995  Date of evaluation: 11/30/24  PCP:  Maryann Sandy MD  Note Started: 6:33 PM EST      CHIEF COMPLAINT       Medication refill    HISTORY OF PRESENT ILLNESS  (Location/Symptom, Timing/Onset, Context/Setting, Quality, Duration, Modifying Factors, Severity.)      Livia Ferrer is a 29 y.o. female who presents with needing her Keppra medication refilled.  Patient states that she is supposed to take 750 mg twice daily.  Patient states that her last dose of Keppra was yesterday.  Patient states that she went to  her medication today but the pharmacy was closed therefore she was unable to get it.  Patient was unsure what to do therefore she came to the emergency department.  Patient denies having any seizures recently, states that she is just here so that she can get her Keppra.  Patient denies any physical complaints at this time.  Patient states that her pharmacy does not reopen until Monday where she is able to get the prescription then.    PAST MEDICAL / SURGICAL / SOCIAL / FAMILY HISTORY      has a past medical history of Anxiety, Asthma, Depression, and Seizures (HCC).     has a past surgical history that includes brain surgery.    Social History     Socioeconomic History    Marital status: Single     Spouse name: Not on file    Number of children: Not on file    Years of education: Not on file    Highest education level: Not on file   Occupational History    Not on file   Tobacco Use    Smoking status: Every Day     Current packs/day: 0.25     Average packs/day: 0.3 packs/day for 4.0 years (1.0 ttl pk-yrs)     Types: Cigarettes    Smokeless tobacco: Never    Tobacco comments:     2-3 per day   Substance and Sexual Activity    Alcohol use: Yes     Comment: socially    Drug use: Yes     Types: Marijuana (Weed)     Comment: daily    Sexual activity: Yes

## 2024-11-30 NOTE — ED PROVIDER NOTES
[facility]     Emergency Department     Faculty Attestation    I performed a history and physical examination of the patient and discussed management with the resident. I reviewed the resident’s note and agree with the documented findings and plan of care. Any areas of disagreement are noted on the chart. I was personally present for the key portions of any procedures. I have documented in the chart those procedures where I was not present during the key portions. I have reviewed the emergency nurses triage note. I agree with the chief complaint, past medical history, past surgical history, allergies, medications, social and family history as documented unless otherwise noted below.        For Physician Assistant/ Nurse Practitioner cases/documentation I have personally evaluated this patient and have completed at least one if not all key elements of the E/M (history, physical exam, and MDM). Additional findings are as noted.  I have personally seen and evaluated the patient.  I find the patient's history and physical exam are consistent with the NP/PA documentation.  I agree with the care provided, treatment rendered, disposition and follow-up plan.          Critical Care     Thee Marie M.D.  Attending Emergency  Physician           Thee Marie MD  11/30/24 [id number]

## 2024-12-02 RX ORDER — LACOSAMIDE 100 MG/1
100 TABLET ORAL 2 TIMES DAILY
Qty: 60 TABLET | Refills: 0 | Status: SHIPPED | OUTPATIENT
Start: 2024-12-02 | End: 2025-01-01

## 2024-12-02 NOTE — TELEPHONE ENCOUNTER
Last visit: 3/4/24  Last Med refill: 9/26/24  Does patient have enough medication for 72 hours: no    Next Visit Date:  No future appointments.    Health Maintenance   Topic Date Due    Pneumococcal 0-64 years Vaccine (1 of 2 - PCV) 02/11/2001    Varicella vaccine (1 of 2 - 13+ 2-dose series) Never done    Hepatitis B vaccine (1 of 3 - 19+ 3-dose series) Never done    Pap smear  Never done    Flu vaccine (1) Never done    COVID-19 Vaccine (1 - 2023-24 season) Never done    Depression Monitoring  03/04/2025    DTaP/Tdap/Td vaccine (2 - Td or Tdap) 08/17/2027    Hepatitis C screen  Completed    HIV screen  Completed    Hepatitis A vaccine  Aged Out    Hib vaccine  Aged Out    HPV vaccine  Aged Out    Polio vaccine  Aged Out    Meningococcal (ACWY) vaccine  Aged Out    Depression Screen  Discontinued       No results found for: \"LABA1C\"          ( goal A1C is < 7)   No components found for: \"LABMICR\"  No components found for: \"LDLCHOLESTEROL\", \"LDLCALC\"    (goal LDL is <100)   AST (U/L)   Date Value   06/18/2022 27     ALT (U/L)   Date Value   06/18/2022 29     BUN (mg/dL)   Date Value   09/06/2024 9     BP Readings from Last 3 Encounters:   11/30/24 112/66   09/06/24 122/81   03/04/24 109/66          (goal 120/80)    All Future Testing planned in [facility]  Lab Frequency Next Occurrence               Patient Active Problem List:     Encounter to establish care     Seizure disorder (HCC)     Trying to get pregnant     Otalgia     Acute suppurative otitis media of left ear without spontaneous rupture of tympanic membrane     Breakthrough seizure (HCC)     Bacterial vaginosis     Urinary tract infection without hematuria     Seizure (HCC)     Localization-related epilepsy (HCC)     History of brain surgery     Asthma     Gastroesophageal reflux disease     Focal epilepsy (HCC)     Depression     Alcohol use     Hordeolum externum

## 2024-12-02 NOTE — TELEPHONE ENCOUNTER
Patient needs to follow up with neurology for refill for seizure medication. Will refill now for 30 days until she calls back her neurologist to set up an appointment and have her seizure medications refilled from their office. Thank you.

## 2024-12-12 ENCOUNTER — HOSPITAL ENCOUNTER (EMERGENCY)
Age: 29
Discharge: HOME | End: 2024-12-12
Attending: EMERGENCY MEDICINE
Payer: MEDICAID

## 2024-12-12 VITALS
OXYGEN SATURATION: 99 % | HEIGHT: 62 IN | DIASTOLIC BLOOD PRESSURE: 83 MMHG | BODY MASS INDEX: 27.6 KG/M2 | SYSTOLIC BLOOD PRESSURE: 115 MMHG | TEMPERATURE: 97.9 F | HEART RATE: 69 BPM | WEIGHT: 150 LBS | RESPIRATION RATE: 16 BRPM

## 2024-12-12 DIAGNOSIS — G40.919 BREAKTHROUGH SEIZURE (HCC): Primary | ICD-10-CM

## 2024-12-12 LAB — LEVETIRACETAM SERPL-MCNC: 76 UG/ML

## 2024-12-12 PROCEDURE — 6370000000 HC RX 637 (ALT 250 FOR IP)

## 2024-12-12 PROCEDURE — 99283 EMERGENCY DEPT VISIT LOW MDM: CPT | Performed by: EMERGENCY MEDICINE

## 2024-12-12 PROCEDURE — 80177 DRUG SCRN QUAN LEVETIRACETAM: CPT

## 2024-12-12 RX ORDER — LACOSAMIDE 100 MG/1
100 TABLET ORAL ONCE
Status: COMPLETED | OUTPATIENT
Start: 2024-12-12 | End: 2024-12-12

## 2024-12-12 RX ORDER — LACOSAMIDE 100 MG/1
100 TABLET ORAL 2 TIMES DAILY
Qty: 60 TABLET | Refills: 0 | Status: SHIPPED | OUTPATIENT
Start: 2024-12-12 | End: 2025-01-11

## 2024-12-12 RX ADMIN — LACOSAMIDE 100 MG: 100 TABLET, FILM COATED ORAL at 22:09

## 2024-12-12 ASSESSMENT — PAIN - FUNCTIONAL ASSESSMENT: PAIN_FUNCTIONAL_ASSESSMENT: NONE - DENIES PAIN

## 2024-12-13 NOTE — ED NOTES
The following labs were labeled with appropriate pt sticker and tubed to lab:     [] Blue     [x] Lavender   [] on ice  [x] Green/yellow  [] Green/black [] on ice  [] Stanley  [] on ice  [x] Yellow  [x] Red  [] Pink  [] Type/ Screen  [] ABG  [] VBG    [] COVID-19 swab    [] Rapid  [] PCR  [] Flu swab  [] Peds Viral Panel     [] Urine Sample  [] Fecal Sample  [] Pelvic Cultures  [] Blood Cultures  [] X 2  [] STREP Cultures

## 2024-12-13 NOTE — ED PROVIDER NOTES
[facility] EMERGENCY DEPARTMENT     Emergency Department     Faculty Attestation        I performed a history and physical examination of the patient and discussed management with the resident. I reviewed the resident’s note and agree with the documented findings and plan of care. Any areas of disagreement are noted on the chart. I was personally present for the key portions of any procedures. I have documented in the chart those procedures where I was not present during the key portions. I have reviewed the emergency nurses triage note. I agree with the chief complaint, past medical history, past surgical history, allergies, medications, social and family history as documented unless otherwise noted below.  For Physician Assistant/ Nurse Practitioner cases/documentation I have personally evaluated this patient and have completed at least one if not all key elements of the E/M (history, physical exam, and MDM). Additional findings are as noted.      Vital Signs: BP: 115/83  Pulse: 69  Respirations: 16  Temp: 97.9 °F (36.6 °C) SpO2: 99 %  PCP:  Maryann Sandy MD  Note Started: 12/12/24, 10:19 PM EST    Pertinent Comments:     Patient is a 29-year-old female with long history of seizure disorder on Keppra and supposed to be on Vimpat as well but her PCP has felt uncomfortable prescribing this as of her previous neurologist who she does not see anymore used to.   Patient may have had 2 breakthrough seizures in the last few days and is here to be evaluated.   Denies any headache or chest pain or shortness of breath and no abdominal pain or vomiting/diarrhea.   No fevers or chills or recent illnesses.   She believes she needs to be back on her Vimpat as well as have her Keppra level checked.    Assessment/Plan: Patient at her normal baseline at this time with normal vital signs and will check Keppra level.   Will also restart the patient's Vimpat that she had been on for

## 2024-12-13 NOTE — ED TRIAGE NOTES
Pt arriving to ed 29 via triage with co of breath through seizures today and yesterday   Pt has hx of seizures and takes keppra for it. Pt used to be on vimpat but has been off for about a month now due to her pcp not able to fill. Has not been able to follow up neuro   No other co at this time   Pt is resting on stretcher with call light within reach.  Breathing is non labored and no acute distress is noted.   Will continue to follow plan of care

## 2024-12-13 NOTE — ED PROVIDER NOTES
[facility] EMERGENCY DEPARTMENT  Emergency Department Encounter  Emergency Medicine Resident     Pt Name:Livia Ferrer  MRN: [id number]  Birthdate 1995  Date of evaluation: 12/12/24  PCP:  Maryann Sandy MD  Note Started: 9:57 PM EST      CHIEF COMPLAINT       Chief Complaint   Patient presents with    Seizures       HISTORY OF PRESENT ILLNESS  (Location/Symptom, Timing/Onset, Context/Setting, Quality, Duration, Modifying Factors, Severity.)      Livia Ferrer is a 29 y.o. female who presents with concerns for breakthrough seizures.  She has a known seizure disorder and takes Keppra, which she has been compliant with.  She is also supposed to be on Vimpat but her PCP took her off of it about a month ago because they were not comfortable managing it long-term.  Patient does not currently have a neurologist and is looking for a new one.  She had a seizure last night and another this morning.  She had a slight headache earlier today that has resolved.  No other injuries, complaints, concerns.  She would like her Keppra level checked because she is concerned about possibly overdosing herself if she is at therapeutic levels currently.     PAST MEDICAL / SURGICAL / SOCIAL / FAMILY HISTORY      has a past medical history of Anxiety, Asthma, Depression, and Seizures (HCC).     has a past surgical history that includes brain surgery.    Social History     Socioeconomic History    Marital status: Single     Spouse name: Not on file    Number of children: Not on file    Years of education: Not on file    Highest education level: Not on file   Occupational History    Not on file   Tobacco Use    Smoking status: Every Day     Current packs/day: 0.25     Average packs/day: 0.3 packs/day for 4.0 years (1.0 ttl pk-yrs)     Types: Cigarettes    Smokeless tobacco: Never    Tobacco comments:     2-3 per day   Substance and Sexual Activity    Alcohol use: Yes     Comment: socially    Drug use: Yes     Types: Marijuana  intact.      Pupils: Pupils are equal, round, and reactive to light.   Cardiovascular:      Rate and Rhythm: Normal rate and regular rhythm.   Pulmonary:      Effort: Pulmonary effort is normal. No respiratory distress.      Breath sounds: Normal breath sounds.   Abdominal:      Palpations: Abdomen is soft.      Tenderness: There is no abdominal tenderness. There is no guarding or rebound.   Musculoskeletal:      Cervical back: Normal range of motion and neck supple.   Skin:     General: Skin is warm and dry.   Neurological:      General: No focal deficit present.      Mental Status: She is alert and oriented to person, place, and time.      Cranial Nerves: No cranial nerve deficit.      Motor: No weakness.      Gait: Gait normal.       DDX/DIAGNOSTIC RESULTS / EMERGENCY DEPARTMENT COURSE / MDM     Medical Decision Making  Livia is a 29 y.o. female who presents with concerns for breakthrough seizures.  She has a known seizure disorder and takes Keppra, which she has been compliant with.  She is also supposed to be on Vimpat but her PCP took her off of it about a month ago because they were not comfortable managing it long-term.  Patient does not currently have a neurologist and is looking for a new one.  She had a seizure last night and another this morning.  She had a slight headache earlier today that has resolved.  No other injuries, complaints, concerns.  She would like her Keppra level checked because she is concerned about possibly overdosing herself if she is at therapeutic levels currently.  Patient is well-appearing on exam, nontoxic, no acute distress.  Neuro exam normal, no deficits.  Heart RRR, lungs clear, abdomen soft and nontender.  Will check Keppra levels at patient's request.  Will also provide with prescription for Vimpat to hold her over until she is able to get a neurologist.  Will provide with our neurologists' information.  Likely discharge.    Amount and/or Complexity of Data Reviewed  Labs:

## 2024-12-13 NOTE — DISCHARGE INSTRUCTIONS
You were seen in the ER today for breakthrough seizure. You are being given a prescription for vimpat, use as directed. You also need to establish and follow with neurology for further long term management.   Return if new or worsening symptoms or any other concerns.

## 2024-12-26 ENCOUNTER — OFFICE VISIT (OUTPATIENT)
Dept: FAMILY MEDICINE CLINIC | Age: 29
End: 2024-12-26
Payer: MEDICAID

## 2024-12-26 VITALS
WEIGHT: 158 LBS | BODY MASS INDEX: 28.9 KG/M2 | DIASTOLIC BLOOD PRESSURE: 77 MMHG | TEMPERATURE: 98.3 F | HEART RATE: 73 BPM | SYSTOLIC BLOOD PRESSURE: 110 MMHG

## 2024-12-26 DIAGNOSIS — F17.200 SMOKING: ICD-10-CM

## 2024-12-26 DIAGNOSIS — G40.909 SEIZURE DISORDER (HCC): Primary | ICD-10-CM

## 2024-12-26 PROCEDURE — 99213 OFFICE O/P EST LOW 20 MIN: CPT

## 2024-12-26 PROCEDURE — 99211 OFF/OP EST MAY X REQ PHY/QHP: CPT

## 2024-12-26 RX ORDER — FOLIC ACID 1 MG/1
1 TABLET ORAL DAILY
Qty: 90 TABLET | Refills: 1 | Status: SHIPPED | OUTPATIENT
Start: 2024-12-26

## 2024-12-26 NOTE — PROGRESS NOTES
[facility] Residency Program - Outpatient Note      Subjective:    Livia Ferrer is a 29 y.o. female with  has a past medical history of Anxiety, Asthma, Depression, and Seizures ([facility]).    Presented to the office today for:  Chief Complaint   Patient presents with   • Seizures     Patient states needing a letter to go back to work       HPI    29-year-old female presented office today for an acute care visit  Patient needed a note to return back to work, patient had 2 episode of absence seizures on Monday and Tuesday, because patient was unable to get her refill of his Vimpat, patient has received her limited and restarted her medication, no seizure episodes since then.  Patient is compliant with her medication  Patient has epilepsy bracelet  Advised patient to make an appointment with neurologist, patient agreeable, will give her an urgent referral.  Patient does not drive herself, patient sue drives her to all her appointments, she also has a Medical cab.    Smoking cessation also discussed with the patient, patient is on nicotine patches  Discussed medication including Chantix with her, she mentioned she would like to think about it, will also print some reading material about Chantix.      The patient has a   Family History   Problem Relation Age of Onset   • Asthma Mother    • Depression Mother    • High Blood Pressure Mother        Objective:    /77 (Site: Left Upper Arm, Position: Sitting, Cuff Size: Medium Adult)   Pulse 73   Temp 98.3 °F (36.8 °C) (Oral)   Wt 71.7 kg (158 lb)   LMP 12/14/2024   BMI 28.90 kg/m²    BP Readings from Last 3 Encounters:   12/26/24 110/77   12/12/24 115/83   11/30/24 112/66       Physical Exam  Constitutional:       Appearance: Normal appearance.   Cardiovascular:      Rate and Rhythm: Normal rate.      Pulses: Normal pulses.      Heart sounds: Normal heart sounds. No murmur heard.  Pulmonary:      Effort: Pulmonary effort is

## 2024-12-26 NOTE — PROGRESS NOTES
Visit Information    Have you changed or started any medications since your last visit including any over-the-counter medicines, vitamins, or herbal medicines? no   Have you stopped taking any of your medications? Is so, why? -  no  Are you having any side effects from any of your medications? - no    Have you seen any other physician or provider since your last visit?  no   Have you had any other diagnostic tests since your last visit?  no   Have you been seen in the emergency room and/or had an admission in a hospital since we last saw you?  yes - . [place]   Have you had your routine dental cleaning in the past 6 months?  no     Do you have an active MyChart account? If no, what is the barrier?  Yes    Patient Care Team:  Maryann Sandy MD as PCP - General (Family Medicine)    Medical History Review  Past Medical, Family, and Social History reviewed and does not contribute to the patient presenting condition    Health Maintenance   Topic Date Due    Pneumococcal 0-64 years Vaccine (1 of 2 - PCV) 02/11/2001    Varicella vaccine (1 of 2 - 13+ 2-dose series) Never done    Hepatitis B vaccine (1 of 3 - 19+ 3-dose series) Never done    Pap smear  Never done    Flu vaccine (1) Never done    COVID-19 Vaccine (1 - 2023-24 season) Never done    Depression Monitoring  03/04/2025    DTaP/Tdap/Td vaccine (2 - Td or Tdap) 08/17/2027    Hepatitis C screen  Completed    HIV screen  Completed    Hepatitis A vaccine  Aged Out    Hib vaccine  Aged Out    HPV vaccine  Aged Out    Polio vaccine  Aged Out    Meningococcal (ACWY) vaccine  Aged Out    Depression Screen  Discontinued

## 2024-12-26 NOTE — PROGRESS NOTES
Attending Physician Statement  I  have discussed the care of Livia Ferrer including pertinent history and exam findings with the resident. I agree with the assessment, plan and orders as documented by the resident.      /77 (Site: Left Upper Arm, Position: Sitting, Cuff Size: Medium Adult)   Pulse 73   Temp 98.3 °F (36.8 °C) (Oral)   Wt 71.7 kg (158 lb)   LMP 12/14/2024   BMI 28.90 kg/m²    BP Readings from Last 3 Encounters:   12/26/24 110/77   12/12/24 115/83   11/30/24 112/66     Wt Readings from Last 3 Encounters:   12/26/24 71.7 kg (158 lb)   12/12/24 68 kg (150 lb)   09/06/24 67.6 kg (149 lb)          Diagnosis Orders   1. Seizure disorder (HCC)  folic acid (FOLVITE) 1 MG tablet    [facility]      2. Smoking  nicotine (NICODERM CQ) 7 MG/24HR              Doris Johnson MD 12/26/2024 4:31 PM

## 2024-12-26 NOTE — PATIENT INSTRUCTIONS
Thank you for letting us take care of you today. We hope all your questions were addressed. If a question was overlooked or something else comes to mind after you return home, please contact a member of your Care Team listed below.      Your Care Team at [facility] is Team #  Benjy Louie M.D. (Faculty)  Ju Duff M.D. (Resident)  Juliocesar Brumfield M.D. (Resident)   Nichole Barboza M.D. (Resident)  Gene Avilez M.D. (Resident)  Carolina Combs M.D. (Resident)  Edith Pina., [facility]  Lilly Kramer, [facility]  Gloria Singh, [facility]  Jadiel Terrell, [facility]  Ariane Hawley, [facility]  Juanita Gonsalez, [facility]  Denisa Rodríguez (LJ) JASON Mcclain (Clinical Practice Manager)  Latesha Ewing [facility] (Clinical Pharmacist)     Office phone number: [phone]    If you need to get in right away due to illness, please be advised we have \"Same Day\" appointments available Monday-Friday. Please call us at [phone] option #3 to schedule your \"Same Day\" appointment.

## 2024-12-28 DIAGNOSIS — R56.9 SEIZURE (HCC): ICD-10-CM

## 2024-12-31 RX ORDER — LACOSAMIDE 100 MG/1
100 TABLET ORAL 2 TIMES DAILY
Qty: 60 TABLET | Refills: 0 | Status: SHIPPED | OUTPATIENT
Start: 2024-12-31 | End: 2025-01-30

## 2025-01-29 DIAGNOSIS — K21.9 GASTROESOPHAGEAL REFLUX DISEASE WITHOUT ESOPHAGITIS: ICD-10-CM

## 2025-01-29 NOTE — TELEPHONE ENCOUNTER
Last visit: 12.26.24  Last Med refill: 10.29.24  Does patient have enough medication for 72 hours: No:     Next Visit Date:  No future appointments.    Health Maintenance   Topic Date Due    Pneumococcal 0-64 years Vaccine (1 of 2 - PCV) 02/11/2001    Varicella vaccine (1 of 2 - 13+ 2-dose series) Never done    Hepatitis B vaccine (1 of 3 - 19+ 3-dose series) Never done    Pap smear  Never done    COVID-19 Vaccine (1 - 2023-24 season) Never done    Flu vaccine (1) 12/26/2025 (Originally 8/1/2024)    Depression Monitoring  03/04/2025    DTaP/Tdap/Td vaccine (2 - Td or Tdap) 08/17/2027    Hepatitis C screen  Completed    HIV screen  Completed    Hepatitis A vaccine  Aged Out    Hib vaccine  Aged Out    HPV vaccine  Aged Out    Polio vaccine  Aged Out    Meningococcal (ACWY) vaccine  Aged Out    Depression Screen  Discontinued       No results found for: \"LABA1C\"          ( goal A1C is < 7)   No components found for: \"LABMICR\"  No components found for: \"LDLCHOLESTEROL\", \"LDLCALC\"    (goal LDL is <100)   AST (U/L)   Date Value   06/18/2022 27     ALT (U/L)   Date Value   06/18/2022 29     BUN (mg/dL)   Date Value   09/06/2024 9     BP Readings from Last 3 Encounters:   12/26/24 110/77   12/12/24 115/83   11/30/24 112/66          (goal 120/80)    All Future Testing planned in [facility]  Lab Frequency Next Occurrence               Patient Active Problem List:     Encounter to establish care     Seizure disorder (HCC)     Trying to get pregnant     Otalgia     Acute suppurative otitis media of left ear without spontaneous rupture of tympanic membrane     Breakthrough seizure (HCC)     Bacterial vaginosis     Urinary tract infection without hematuria     Seizure (HCC)     Localization-related epilepsy (HCC)     History of brain surgery     Asthma     Gastroesophageal reflux disease     Focal epilepsy (HCC)     Depression     Alcohol use     Hordeolum externum

## 2025-03-13 ENCOUNTER — TELEPHONE (OUTPATIENT)
Dept: FAMILY MEDICINE CLINIC | Age: 30
End: 2025-03-13

## 2025-03-13 DIAGNOSIS — S83.402A SPRAIN OF COLLATERAL LIGAMENT OF LEFT KNEE, INITIAL ENCOUNTER: Primary | ICD-10-CM

## 2025-03-19 ENCOUNTER — OFFICE VISIT (OUTPATIENT)
Dept: ORTHOPEDIC SURGERY | Age: 30
End: 2025-03-19
Payer: MEDICAID

## 2025-03-19 ENCOUNTER — TELEPHONE (OUTPATIENT)
Dept: FAMILY MEDICINE CLINIC | Age: 30
End: 2025-03-19

## 2025-03-19 VITALS — HEIGHT: 62 IN | BODY MASS INDEX: 29.08 KG/M2 | WEIGHT: 158 LBS

## 2025-03-19 DIAGNOSIS — S83.421A TEAR OF LATERAL COLLATERAL LIGAMENT OF RIGHT KNEE, INITIAL ENCOUNTER: ICD-10-CM

## 2025-03-19 DIAGNOSIS — S83.511A RUPTURE OF ANTERIOR CRUCIATE LIGAMENT OF RIGHT KNEE, INITIAL ENCOUNTER: ICD-10-CM

## 2025-03-19 DIAGNOSIS — M25.562 LEFT KNEE PAIN, UNSPECIFIED CHRONICITY: Primary | ICD-10-CM

## 2025-03-19 PROCEDURE — 99203 OFFICE O/P NEW LOW 30 MIN: CPT | Performed by: STUDENT IN AN ORGANIZED HEALTH CARE EDUCATION/TRAINING PROGRAM

## 2025-03-19 RX ORDER — DICLOFENAC SODIUM 75 MG/1
75 TABLET, DELAYED RELEASE ORAL 2 TIMES DAILY
Qty: 60 TABLET | Refills: 0 | Status: SHIPPED | OUTPATIENT
Start: 2025-03-19

## 2025-03-19 NOTE — TELEPHONE ENCOUNTER
Patient called to see if could walter for later time tomorrow. Due to transportation .Writer [facility] follow up for 3:20 tomorrow. Patient thanked writer.

## 2025-03-19 NOTE — PROGRESS NOTES
injections, further imaging studies and, as a last result, surgical interventions.   - Today the patient is amenable to obtaining a stat MRI of her right knee for further assessment.  - Recommended Weightbearing Status: Protected weightbearing with use of crutches.  - All questions were answered to the patient's satisfaction.  - The patient should return to the clinic for reevaluation after completion of MRI. They will call the office immediately with any problems.    Follow up:Return for reevaluation after right knee MRI.    Orders Placed This Encounter   Medications    diclofenac (VOLTAREN) 75 MG EC tablet     Sig: Take 1 tablet by mouth 2 times daily     Dispense:  60 tablet     Refill:  0            Orders Placed This Encounter   Procedures    XR KNEE LEFT (MIN 4 VIEWS)     Standing Status:   Future     Number of Occurrences:   1     Expected Date:   3/19/2025     Expiration Date:   3/17/2026    MRI KNEE RIGHT WO CONTRAST     Standing Status:   Future     Expected Date:   3/19/2025     Expiration Date:   3/19/2026     Reason for exam::   ACL/LCL tear     What is the sedation requirement?:   None       Amanuel Tam MD  Orthopedic Surgery, PGY-2  [place], [place]

## 2025-03-20 ENCOUNTER — HOSPITAL ENCOUNTER (OUTPATIENT)
Dept: MRI IMAGING | Age: 30
Discharge: HOME | End: 2025-03-22
Payer: MEDICAID

## 2025-03-20 ENCOUNTER — OFFICE VISIT (OUTPATIENT)
Dept: FAMILY MEDICINE CLINIC | Age: 30
End: 2025-03-20

## 2025-03-20 ENCOUNTER — HOSPITAL ENCOUNTER (EMERGENCY)
Age: 30
Discharge: HOME | End: 2025-03-20
Attending: EMERGENCY MEDICINE
Payer: MEDICAID

## 2025-03-20 ENCOUNTER — APPOINTMENT (OUTPATIENT)
Dept: FAMILY MEDICINE CLINIC | Age: 30
End: 2025-03-20
Payer: MEDICAID

## 2025-03-20 VITALS
WEIGHT: 158 LBS | HEIGHT: 62 IN | HEART RATE: 112 BPM | SYSTOLIC BLOOD PRESSURE: 123 MMHG | BODY MASS INDEX: 29.08 KG/M2 | DIASTOLIC BLOOD PRESSURE: 84 MMHG

## 2025-03-20 VITALS
RESPIRATION RATE: 18 BRPM | TEMPERATURE: 98.2 F | DIASTOLIC BLOOD PRESSURE: 76 MMHG | HEART RATE: 72 BPM | SYSTOLIC BLOOD PRESSURE: 116 MMHG | OXYGEN SATURATION: 100 %

## 2025-03-20 DIAGNOSIS — S83.511A RUPTURE OF ANTERIOR CRUCIATE LIGAMENT OF RIGHT KNEE, INITIAL ENCOUNTER: ICD-10-CM

## 2025-03-20 DIAGNOSIS — M25.561 ACUTE PAIN OF RIGHT KNEE: Primary | ICD-10-CM

## 2025-03-20 DIAGNOSIS — S83.421A TEAR OF LATERAL COLLATERAL LIGAMENT OF RIGHT KNEE, INITIAL ENCOUNTER: ICD-10-CM

## 2025-03-20 DIAGNOSIS — S89.91XD KNEE INJURY, RIGHT, SUBSEQUENT ENCOUNTER: Primary | ICD-10-CM

## 2025-03-20 PROCEDURE — 6360000002 HC RX W HCPCS

## 2025-03-20 PROCEDURE — 73721 MRI JNT OF LWR EXTRE W/O DYE: CPT

## 2025-03-20 PROCEDURE — 96372 THER/PROPH/DIAG INJ SC/IM: CPT

## 2025-03-20 PROCEDURE — 99284 EMERGENCY DEPT VISIT MOD MDM: CPT

## 2025-03-20 RX ORDER — OXYCODONE AND ACETAMINOPHEN 5; 325 MG/1; MG/1
1 TABLET ORAL EVERY 6 HOURS PRN
Qty: 28 TABLET | Refills: 0 | Status: SHIPPED | OUTPATIENT
Start: 2025-03-20 | End: 2025-03-27

## 2025-03-20 RX ADMIN — HYDROMORPHONE HYDROCHLORIDE 1 MG: 1 INJECTION, SOLUTION INTRAMUSCULAR; INTRAVENOUS; SUBCUTANEOUS at 01:18

## 2025-03-20 SDOH — ECONOMIC STABILITY: FOOD INSECURITY: WITHIN THE PAST 12 MONTHS, THE FOOD YOU BOUGHT JUST DIDN'T LAST AND YOU DIDN'T HAVE MONEY TO GET MORE.: NEVER TRUE

## 2025-03-20 SDOH — ECONOMIC STABILITY: FOOD INSECURITY: WITHIN THE PAST 12 MONTHS, YOU WORRIED THAT YOUR FOOD WOULD RUN OUT BEFORE YOU GOT MONEY TO BUY MORE.: NEVER TRUE

## 2025-03-20 ASSESSMENT — PATIENT HEALTH QUESTIONNAIRE - PHQ9
2. FEELING DOWN, DEPRESSED OR HOPELESS: NOT AT ALL
10. IF YOU CHECKED OFF ANY PROBLEMS, HOW DIFFICULT HAVE THESE PROBLEMS MADE IT FOR YOU TO DO YOUR WORK, TAKE CARE OF THINGS AT HOME, OR GET ALONG WITH OTHER PEOPLE: NOT DIFFICULT AT ALL
SUM OF ALL RESPONSES TO PHQ QUESTIONS 1-9: 0
SUM OF ALL RESPONSES TO PHQ QUESTIONS 1-9: 0
4. FEELING TIRED OR HAVING LITTLE ENERGY: NOT AT ALL
8. MOVING OR SPEAKING SO SLOWLY THAT OTHER PEOPLE COULD HAVE NOTICED. OR THE OPPOSITE, BEING SO FIGETY OR RESTLESS THAT YOU HAVE BEEN MOVING AROUND A LOT MORE THAN USUAL: NOT AT ALL
9. THOUGHTS THAT YOU WOULD BE BETTER OFF DEAD, OR OF HURTING YOURSELF: NOT AT ALL
5. POOR APPETITE OR OVEREATING: NOT AT ALL
1. LITTLE INTEREST OR PLEASURE IN DOING THINGS: NOT AT ALL
SUM OF ALL RESPONSES TO PHQ QUESTIONS 1-9: 0
7. TROUBLE CONCENTRATING ON THINGS, SUCH AS READING THE NEWSPAPER OR WATCHING TELEVISION: NOT AT ALL
6. FEELING BAD ABOUT YOURSELF - OR THAT YOU ARE A FAILURE OR HAVE LET YOURSELF OR YOUR FAMILY DOWN: NOT AT ALL
SUM OF ALL RESPONSES TO PHQ QUESTIONS 1-9: 0
3. TROUBLE FALLING OR STAYING ASLEEP: NOT AT ALL

## 2025-03-20 ASSESSMENT — PAIN DESCRIPTION - ORIENTATION: ORIENTATION: RIGHT

## 2025-03-20 ASSESSMENT — PAIN SCALES - GENERAL
PAINLEVEL_OUTOF10: 8
PAINLEVEL_OUTOF10: 3

## 2025-03-20 ASSESSMENT — PAIN - FUNCTIONAL ASSESSMENT
PAIN_FUNCTIONAL_ASSESSMENT: 0-10
PAIN_FUNCTIONAL_ASSESSMENT: 0-10

## 2025-03-20 ASSESSMENT — PAIN DESCRIPTION - LOCATION: LOCATION: KNEE

## 2025-03-20 NOTE — PROGRESS NOTES
[facility] Residency Program - Outpatient Note      Subjective:    Livia Ferrer is a 30 y.o. female with  has a past medical history of Anxiety, Asthma, Depression, and Seizures (formerly [facility]).    Presented to the office today for:  Chief Complaint   Patient presents with    Knee Injury     Ed follow up       HPI    Patient is here for follow-up of an ED visit that she had to go to this morning secondary to her right knee pain  Recently seen in the hospital and was establishing care with orthopedics yesterday  They ordered a stat MRI-there are differential diagnosis of possible ACL and LCL tear of the right knee   X-ray showing large joint effusion  Patient was given in her previous hospitalization Tylenol threes but in the most recent hospital encounter was given Bonduel  Using crutches to get by; orthopedics note stating they recommended weightbearing status with the use of crutches  States that the Tylenol threes works better for her    Review of Systems   Constitutional:  Negative for chills and fever.   HENT:  Negative for congestion.    Respiratory:  Negative for cough.    Cardiovascular:  Negative for chest pain, palpitations and leg swelling.   Musculoskeletal:  Positive for gait problem.        Right knee pain   Neurological:  Negative for dizziness, weakness, light-headedness, numbness and headaches.       The patient has a   Family History   Problem Relation Age of Onset    Asthma Mother     Depression Mother     High Blood Pressure Mother        Objective:    /84 (BP Site: Right Upper Arm, Patient Position: Sitting, BP Cuff Size: Medium Adult)   Pulse (!) 112   Ht 1.575 m (5' 2.01\")   Wt 71.7 kg (158 lb)   BMI 28.89 kg/m²    BP Readings from Last 3 Encounters:   03/20/25 123/84   03/20/25 116/76   12/26/24 110/77       Physical Exam  Constitutional:       General: She is not in acute distress.     Appearance: She is not ill-appearing.   Cardiovascular:      Rate and

## 2025-03-20 NOTE — PATIENT INSTRUCTIONS
Thank you for letting us take care of you today. We hope all your questions were addressed. If a question was overlooked or something else comes to mind after you return home, please contact a member of your Care Team listed below.      Your Care Team at [facility] is Team #2  Doris Johnson M.D. (Faculty)  Mari Irizarry M.D. (Resident)  Hope Hernandez M.D. (Resident)  Mohamud Doshi M.D. (Resident)  Maryann Sandy M.D. (Resident)  Johana Britt M.D. (Resident)  Jadiel Kramer, [facility]  Patricia Terrell, [facility]  Edith Pina,  [facility]  Ariane Hawley, [facility]  Juanita Gonsalez, [facility]  Gloria Singh, [facility]  Denisa Rodríguez (LJ) Johny DAVID (Clinical Practice Manager)  Latesha Ewing [facility] (Clinical Pharmacist)     Office phone number: [phone]    If you need to get in right away due to illness, please be advised we have \"Same Day\" appointments available Monday-Friday. Please call us at [phone] option #3 to schedule your \"Same Day\" appointment.

## 2025-03-20 NOTE — ED NOTES
Pt presents to the ED with c/o R knee pain.   Pt tripped and fell last week, injuring her knee. Pt had XR's done and was evaluated by ortho today.   Pt is scheduled for an MRI tomorrow. Pt states she is just unable to get comfortable even taking home pain medications.   Pt is in no distress but appears uncomfortable. RR even and unlabored. Pain and swelling to the R knee and down the R lower leg.   Ice applied. Call light within reach.

## 2025-03-20 NOTE — ED PROVIDER NOTES
[facility]     Emergency Department     Faculty Attestation    I performed a history and physical examination of the patient and discussed management with all residents. I reviewed the resident’s note and agree with the documented findings and plan of care. Any areas of disagreement are noted on the chart. I was personally present for the key portions of any procedures. I have documented in the chart those procedures where I was not present during the key portions. I have reviewed the emergency nurses triage note. I agree with the chief complaint, past medical history, past surgical history, allergies, medications, social and family history as documented unless otherwise noted below.    For Physician Assistant/ Nurse Practitioner cases/documentation I have personally evaluated this patient and have completed at least one if not all key elements of the E/M (history, physical exam, and MDM). Additional findings are as noted.      Primary Care Physician:  Maryann Sandy MD    CHIEF COMPLAINT     No chief complaint on file.      RECENT VITALS:    ,   ,  ,      LABS:  Labs Reviewed - No data to display    Radiology  No orders to display         Attending Physician Additional  Notes    The patient is a 30-year-old female who presents for evaluation of a right knee injury.  The patient reports that she injured her right knee 1 week ago.  She was seen by orthopedic surgery here at [facility] and has an MRI scheduled for later today at 4 PM.  She has been prescribed diclofenac for her pain which she has been taking without improvement.  She has also tried ice, elevation, and Tylenol without relief.  The patient denies any new injury or any previous injury or surgery to the right knee.  She does not list any other palliating factors.  She has been ambulating with crutches.  Pain is worse with any movement of the right knee.  She denies any calf tenderness, calf swelling, or history of blood clots.  The  patient denies fever, chills, headache, vision changes, neck pain, back pain, chest pain, shortness of breath, abdominal pain, nausea, vomiting, urinary/bowel symptoms, focal weakness, numbness, tingling, or recent illness.    Vital signs are stable.  Heart regular rate and rhythm.  Lungs clear to auscultation.  Abdomen soft and nontender.  No midline spinal tenderness to palpation, step-off or deformity.  No pain over the pelvis, hips, or left leg.  There is tenderness palpation diffusely to the right knee.  Difficult to do any ligamentous testing secondary to pain.  There is a right knee joint effusion.  No pain over the proximal fibular head or the base of the fifth metatarsal.  No pain over the right ankle.  DP/PT pulses 2+/4 and equal bilaterally.  Strength 5/5 with dorsi and plantarflexion of bilateral feet.    The patient has tried conservative treatment at home for her right knee without improvement.  Plan to treat with opioids to see if this alleviates pain.  Will continue to elevate and ice the right knee.  Plan to reassess.            Mai Tariq DO,  FACEP  Attending Emergency Physician            Mai Tariq DO  03/20/25 0129

## 2025-03-20 NOTE — ED PROVIDER NOTES
[facility] EMERGENCY DEPARTMENT  Emergency Department Encounter  Emergency Medicine Resident     Pt Name:Livia Ferrer  MRN: [id number]  Birthdate 1995  Date of evaluation: 3/20/25  PCP:  Maryann Sandy MD  Note Started: 1:11 AM EDT      CHIEF COMPLAINT       Chief Complaint   Patient presents with    Knee Pain     R       HISTORY OF PRESENT ILLNESS  (Location/Symptom, Timing/Onset, Context/Setting, Quality, Duration, Modifying Factors, Severity.)      Livia Ferrer is a 30 y.o. female who presents with severe right knee pain.  Patient tripped and fell last week and injured her right knee.  She states that she hyperextended her right knee.  She has already been evaluated by Ortho.  Orthopedic surgery evaluated and scheduled an MRI for tomorrow.  Patient has been experiencing excruciating pain at home which has not been relieved by her prescription pain medications.  Patient was prescribed diclofenac which has not helped.  Patient reports that she has been icing and elevating her knee at home as well.  Patient denies any numbness or decreased sensation in her right lower extremity.  Physical exam was limited due to pain.  There is swelling of the right knee joint capsule.  Otherwise, patient denies any headache, chest pain, shortness of breath, abdominal pain, nausea, vomiting, focal neurological deficits, or any other concerning symptoms at this time.  All other review of systems are negative.    PAST MEDICAL / SURGICAL / SOCIAL / FAMILY HISTORY      has a past medical history of Anxiety, Asthma, Depression, and Seizures (HCC).     has a past surgical history that includes brain surgery.    Social History     Socioeconomic History    Marital status: Single     Spouse name: Not on file    Number of children: Not on file    Years of education: Not on file    Highest education level: Not on file   Occupational History    Not on file   Tobacco Use    Smoking status: Every Day     Current packs/day:  Chief Complaint   Patient presents with   • Establish Care     Prev PCP 30 years ago   • Leg Pain     Left leg   • Foot Pain     Bilateral foot pain, right worse than left.       SUBJECTIVE:  Maurizio Ross is a 63 year old  male who presents to clinic to establish care with chief complaint of bilateral thigh pain and bilateral foot pain that is worse on the left.     He states that he has been having bilateral thigh pain for about 4-5 months that is worse at the end of the day of work. It is associated with burning sensation and tingling along the outside of his thighs. He denies trauma, falls, radiculopathy, saddle anesthesia, urinary and bowel incontinence. He also is having bilateral foot pain which is worse on the right compared to the left. Pain is located on the outside of his right foot. Pain is worse at the end of the day as he is on his feet most of the day.     He denies fevers, chills, headache, blurred vision, lightheadedness, chest pain, shortness of breath, palpitations, nausea/vomiting, abdominal pain, dysuria, diarrhea.     Exercise: Admits that he does not exercise due to COVID  Nutrition: Admits that his diet is not well balanced  Caffeine: Currently drinks 1-2 cups per day  Employment: [employer]  Sleep: Sleeps average 5-6 hours per day    PROBLEM LIST:    There is no problem list on file for this patient.      MEDICATIONS:    No current outpatient medications on file.     No current facility-administered medications for this visit.        ALLERGIES:    ALLERGIES:  No Known Allergies    PAST MEDICAL HISTORY:    History reviewed. No pertinent past medical history.    SURGICAL HISTORY:    History reviewed. No pertinent surgical history.    FAMILY HISTORY:    Family History   Problem Relation Age of Onset   • Diabetes Mother    • Hypertension Mother        SOCIAL HISTORY:       Social History     Tobacco Use   • Smoking status: Current Some Day Smoker     Packs/day: 0.25     Years: 44.00      Pack years: 11.00     Types: Cigarettes   • Smokeless tobacco: Never Used   Substance Use Topics   • Alcohol use: Never     Frequency: Never   • Drug use: Never       REVIEW OF SYSTEMS:  Constitutional: Negative for fever, chills, or fatigue.  Eyes: Negative for visual changes.  ENT: Negative for rhinorrhea, ear pain or sore throat.  Respiratory: Negative for cough or shortness of breath.    Cardiovascular: Negative for chest pain, palpitations.   Gastrointestinal: Negative for nausea, vomiting, diarrhea or abdominal pain.   Genitourinary: Negative for dysuria, urgency, frequency.  Extremities:  Bilateral thigh and foot pain. Negative for joint swelling or joint pain.  Neurologic:  Negative for headache.  Endocrine: Negative for heat or cold intolerance, weight loss or gain.  Psychiatric: Negative for anxiety, depression.    OBJECTIVE:  Vital Signs:    Visit Vitals  BP (!) 148/88 (BP Location: RUE - Right upper extremity, Patient Position: Sitting, Cuff Size: Regular)   Pulse 64   Temp 97.4 °F (36.3 °C) (Temporal)   Resp 17   Ht 6' 2\" (1.88 m)   Wt 103.6 kg   SpO2 99%   BMI 29.31 kg/m²     General:   Alert, cooperative, conversive in no acute distress.  Skin:  Warm and dry without rash.    Head:  Normocephalic-atraumatic.   Neck:  Trachea is midline. No adenopathy.  Normal thyroid without mass or tenderness.  Eyes:  Normal conjunctivae and sclerae.  PERRL (Pupils equal, round, reactive to light).  EOMI (Extraocular movements intact).  ENT:  Mucous membranes are moist.  Normal tympanic membranes and external auditory canals bilaterally.  No pharyngeal erythema or exudate.  No facial tenderness.  Normal nasal mucosa.  Cardiovascular:  Symmetrical pulses.  Regular rate and rhythm without murmur.  Respiratory:  Normal respiratory effort.  CTA (Clear to auscultation).  No wheezes, rales or rhonchi.  Gastrointestinal:  Soft and nontender.  Normal bowel sounds.  No hepatomegaly or splenomegaly.   Musculoskeletal:  No  deformity or edema.  No tenderness to palpation.  Back:   Normal alignment.  No CVA (costovertebral angle) tenderness or midline bony tenderness.  Neurologic:  Oriented x 3.  Cranial nerves 2-12 are intact.  No focal deficits or lateralizing signs.  Psychiatric:   Cooperative.  Appropriate mood and affect.      ASSESSMENT:  1. Annual physical exam    2. Need for vaccination    3. Screening for colorectal cancer        PLAN:  Discussed health and wellness.  Recommend a low fat/low cholesterol/low salt diet.  Exercise for at least 30 minutes most days of the week.  Work on weight loss. Alcohol and caffeine in moderation.  Maurizio was given written informational material on diet, exercise, weight loss, cholesterol.  Patient will be notified of all pending lab results.  Recommend a yearly physical.    Orders Placed This Encounter   • PNEUMOCOCCAL POLYSACCHARIDE ADULT VACC, IM/SQ (PNEUMOVAX 23)   • TETANUS DIPHTHERIA ACELLULAR PERTUSSIS VACC, 10+ YRS (BOOSTRIX)   • INFLUENZA QUADRIVALENT SPLIT PRES FREE 0.5 ML VACC, IM (FLULAVAL,FLUARIX,FLUZONE)   • CBC with Automated Differential   • Comprehensive Metabolic Panel   • Hepatitis C Antibody With Reflex   • Lipid Panel With Reflex   • Thyroid Stimulating Hormone Reflex   • Glycohemoglobin   • SERVICE TO GASTROENTEROLOGY     - Encouraged patient to increase activity and exercise with stretching   - Advised patient to get Dr. Amaro inserts for shoes  - RTC in 1 week to discuss further management    Return in about 1 week (around 10/26/2020)., or if symptoms worsen or fail to improve.       Sheila España, DO  Family Medicine  10/19/20     relieved by her prescription pain medications.  Patient has been icing and elevating her knee at home as well.  Patient was prescribed diclofenac which has not helped.      Patient presents for pain management.  She has already had appropriate workup for her knee injury and will be getting an MRI tomorrow.  Swelling of the knee joint, but no concern for DVT or other acute medical concerns aside from her knee injury which is already being worked up by orthopedic surgery.    Plan:  Will treat patient's pain with opioid medication.    Risk  Prescription drug management.        FINAL IMPRESSION      1. Knee injury, right, subsequent encounter          DISPOSITION / PLAN     DISPOSITION Decision To Discharge 03/20/2025 02:12:04 AM   DISPOSITION CONDITION Stable           PATIENT REFERRED TO:  Maryann Sandy MD  [address] [facility] [address] [facility] [id number] [phone]    In 1 day      [facility] Emergency Department  [address] [id number] [phone]  Go to   As needed      DISCHARGE MEDICATIONS:  Discharge Medication List as of 3/20/2025  2:15 AM        START taking these medications    Details   oxyCODONE-acetaminophen (PERCOCET) 5-325 MG per tablet Take 1 tablet by mouth every 6 hours as needed for Pain for up to 7 days. Intended supply: 7 days. Take lowest dose possible to manage pain Max Daily Amount: 4 tablets, Disp-28 tablet, R-0Normal             Gilberto Villalpando,   Emergency Medicine Resident    (Please note that portions of this note were completed with a voice recognition program.  Efforts were made to edit the dictations but occasionally words are mis-transcribed.)

## 2025-03-20 NOTE — PROGRESS NOTES
Visit Information    Have you changed or started any medications since your last visit including any over-the-counter medicines, vitamins, or herbal medicines? no   Are you having any side effects from any of your medications? -  no  Have you stopped taking any of your medications? Is so, why? -  no    Have you seen any other physician or provider since your last visit? No  Have you had any other diagnostic tests since your last visit? Yes - Records Obtained  Have you been seen in the emergency room and/or had an admission to a hospital since we last saw you? Yes - Records Obtained  Have you had your routine dental cleaning in the past 6 months? no    Have you activated your [employer] account? If not, what are your barriers? Yes     Patient Care Team:  Maryann Sandy MD as PCP - General (Family Medicine)    Medical History Review  Past Medical, Family, and Social History reviewed and does not contribute to the patient presenting condition    Health Maintenance   Topic Date Due    Varicella vaccine (1 of 2 - 13+ 2-dose series) Never done    Hepatitis B vaccine (1 of 3 - 19+ 3-dose series) Never done    Pneumococcal 0-49 years Vaccine (1 of 2 - PCV) 02/11/2014    COVID-19 Vaccine (1 - 2024-25 season) Never done    Cervical cancer screen  Never done    Depression Monitoring  03/04/2025    Flu vaccine (1) 12/26/2025 (Originally 8/1/2024)    DTaP/Tdap/Td vaccine (2 - Td or Tdap) 08/17/2027    Hepatitis C screen  Completed    HIV screen  Completed    Hepatitis A vaccine  Aged Out    Hib vaccine  Aged Out    HPV vaccine  Aged Out    Polio vaccine  Aged Out    Meningococcal (ACWY) vaccine  Aged Out    Meningococcal B vaccine  Aged Out    Depression Screen  Discontinued

## 2025-03-20 NOTE — DISCHARGE INSTRUCTIONS
You were evaluated in the emergency department due to your knee pain.  Your pain was treated with opioid medication and your pain resolved.  You were discharged with a prescription for Percocet.    Call today or tomorrow to follow up with Maryann Sandy MD tomorrow    Use an ice pack or bag filled with ice and apply to the injured area 3 - 4 times a day for 15 - 20 minutes each time.    Use ibuprofen or Tylenol (unless prescribed medications that have Tylenol in it) for pain.  You can take over the counter Ibuprofen (advil) tablets (4 every 8 hours or 3 every 6 hours or 2 every 4 hours)    Use your crutches for the next several days until you are able to take 10 steps without pain.    Return to the Emergency Department for worsening of pain, swelling to the knee, inability to move your knee, unable to walk, any other care or concern.

## 2025-03-26 ENCOUNTER — OFFICE VISIT (OUTPATIENT)
Dept: ORTHOPEDIC SURGERY | Age: 30
End: 2025-03-26
Payer: MEDICAID

## 2025-03-26 VITALS — HEIGHT: 62 IN | BODY MASS INDEX: 28.89 KG/M2

## 2025-03-26 DIAGNOSIS — S83.421A TEAR OF LATERAL COLLATERAL LIGAMENT OF RIGHT KNEE, INITIAL ENCOUNTER: ICD-10-CM

## 2025-03-26 DIAGNOSIS — S83.521A RUPTURE OF POSTERIOR CRUCIATE LIGAMENT OF RIGHT KNEE, INITIAL ENCOUNTER: ICD-10-CM

## 2025-03-26 DIAGNOSIS — S83.511A RUPTURE OF ANTERIOR CRUCIATE LIGAMENT OF RIGHT KNEE, INITIAL ENCOUNTER: Primary | ICD-10-CM

## 2025-03-26 PROCEDURE — 99213 OFFICE O/P EST LOW 20 MIN: CPT | Performed by: ORTHOPAEDIC SURGERY

## 2025-03-26 RX ORDER — OXYCODONE AND ACETAMINOPHEN 5; 325 MG/1; MG/1
1 TABLET ORAL EVERY 6 HOURS PRN
Qty: 28 TABLET | Refills: 0 | Status: SHIPPED | OUTPATIENT
Start: 2025-03-26 | End: 2025-04-02

## 2025-03-26 NOTE — PROGRESS NOTES
[facility]  [address]  [place] [address]  Dept: [phone]  Dept Fax: [phone]        Ambulatory   Follow Up    Subjective:   Livia Ferrer is a 30 year old  female who presents to our office today for continued evaluation of their right knee pain.  Patient's original injury date was 3/13/2025. patient was seen in our office on 3/19/2025 after an injury to her right knee as she stepped and tripped over a crack on the sidewalk as she was walking to work.  Patient works for [employer].  Patient was able to obtain an MRI which demonstrated a complete ACL rupture, partial/complete PCL rupture as well as LCL avulsion with retraction.  Patient has been using crutches and has noticed decrease in swelling but does have continued pain as well as limited range of motion.  He has been wearing compressive knee sleeve as well as been icing her knee.    Review of Systems   Constitutional: Negative for Fever and Chills.   HENT: Negative for Congestion.    Eyes: Negative for Blurred Vision and Double Vision.   Respiratory: Negative for Cough, Shortness of Breath and Wheezing.    Cardiovascular: Negative for Chest Pain and Palpitations.   Gastrointestinal: Negative for Nausea. Negative for Vomiting.   Musculoskeletal: Positive for Myalgias and Joint Pain (right knee pain).   Skin: Negative for Itching and Rash.   Neurological: Negative for Dizziness, Sensory Change and Headaches.   Psychiatric/Behavioral: Negative for Depression and Suicidal Ideas.     Objective:   General: AAOx3, NAD.  Ortho Exam  Neuro: Alert. Oriented.  Eyes: Extra-Ocular Muscles Intact.  Mouth: Oral Mucosa Moist. No Perioral Lesions.  Pulm: Respirations Unlabored and Regular.  Skin: Warm, Well Perfused.  Psych: Patient has good fund of knowledge and displays understanding of Exam, Diagnosis, and Plan.  MSK:   RLE.  Skin intact. prominent knee effusion.

## 2025-03-28 ENCOUNTER — OFFICE VISIT (OUTPATIENT)
Dept: ORTHOPEDIC SURGERY | Age: 30
End: 2025-03-28
Payer: MEDICAID

## 2025-03-28 VITALS — HEIGHT: 62 IN | WEIGHT: 158 LBS | RESPIRATION RATE: 16 BRPM | BODY MASS INDEX: 29.08 KG/M2 | OXYGEN SATURATION: 100 %

## 2025-03-28 DIAGNOSIS — S89.91XA INJURY OF POSTEROLATERAL CORNER OF RIGHT KNEE, INITIAL ENCOUNTER: ICD-10-CM

## 2025-03-28 DIAGNOSIS — S83.421A TEAR OF LCL (LATERAL COLLATERAL LIGAMENT) OF KNEE, RIGHT, INITIAL ENCOUNTER: ICD-10-CM

## 2025-03-28 DIAGNOSIS — S83.511A COMPLETE TEAR OF RIGHT ACL, INITIAL ENCOUNTER: Primary | ICD-10-CM

## 2025-03-28 DIAGNOSIS — S89.91XA PCL INJURY, RIGHT, INITIAL ENCOUNTER: ICD-10-CM

## 2025-03-28 PROCEDURE — 99214 OFFICE O/P EST MOD 30 MIN: CPT | Performed by: ORTHOPAEDIC SURGERY

## 2025-03-28 NOTE — PATIENT INSTRUCTIONS
PATIENTIQ:  PatientIQ helps [facility] stay in touch with you to know how you're feeling, and provides education and care instructions to you at various time points.   Your answers help your care team track your progress to provide the best care possible. PatientIQ will contact you pre-op and post-op via email or text with:  Educational Videos and Care Instructions  Questionnaires About How You're Feeling    Your participation provides you valuable education and helps [facility] continue to provide quality care to all patients. Thank you

## 2025-03-28 NOTE — PROGRESS NOTES
[facility]  [address]  [place] [id number]  Dept: [phone]     Orthopedic Surgery    Knee Pain (Right )       03/28/25  Livia Ferrer is a 30 y.o. female presenting for evaluation of right knee multi ligamentous injury.  She states that she was walking but was distracted by looking at her phone when she had a trip and fall.  She states that the right knee bent backwards and she had subsequent pain and instability.  MRI demonstrates complete ACL, PCL, LCL, and posterior lateral corner injuries.  She has weakness of right foot dorsiflexion.  She has hyperesthesia of the peroneal nerve distribution.  My recommendation would be for surgery to help stabilize the knee.  This would be an ACL, PCL, LCL, posterior lateral corner reconstruction with allograft.  She would like to proceed.      Review of Systems:  Joint pain  All other systems reviewed and are negative.    Past Surgical History:   Procedure Laterality Date    BRAIN SURGERY        Past Medical History:   Diagnosis Date    Anxiety     Asthma 7/12/2019    Depression 2/25/2022    Seizures (HCC)         Physical Exam:  Resp 16   Ht 1.575 m (5' 2\")   Wt 71.7 kg (158 lb)   SpO2 100%   BMI 28.90 kg/m²    Constitutional: No acute distress, comfortable  Respiratory: Unlabored breathing with normal rate, no cough  Cardiovascular: Warm well perfused extremities  Psych: Appropriate mood behavior     Right knee is in a knee immobilizer.  She has hyperesthesia of the lateral lower leg and the peroneal nerve distribution.  She is able to dorsiflex her ankle but with diminished strength compared to the left ankle.  She has good pulses    Imaging personally reviewed:  MRI right knee demonstrates complete ACL, PCL, LCL, posterior lateral corner injuries.      Assessment & Plan     Livia Ferrer is a 30 y.o. year old female with  1. Complete tear of right

## 2025-04-01 ENCOUNTER — PREP FOR PROCEDURE (OUTPATIENT)
Dept: ORTHOPEDIC SURGERY | Age: 30
End: 2025-04-01

## 2025-04-01 DIAGNOSIS — S89.91XA: ICD-10-CM

## 2025-04-01 DIAGNOSIS — Z01.818 PREOPERATIVE TESTING: Primary | ICD-10-CM

## 2025-04-01 DIAGNOSIS — S89.91XA RIGHT KNEE INJURY, INITIAL ENCOUNTER: ICD-10-CM

## 2025-04-01 PROBLEM — S83.121A: Status: ACTIVE | Noted: 2025-04-01

## 2025-04-01 PROBLEM — S83.511A COMPLETE TEAR OF ANTERIOR CRUCIATE LIGAMENT OF RIGHT KNEE: Status: ACTIVE | Noted: 2025-04-01

## 2025-04-01 NOTE — PROGRESS NOTES
ATTENDING NOTE    Attending Physician Statement  I have discussed the care of Hussaincholsincluding pertinent history and exam findings,  with the resident. I have reviewed the key elements of all parts of the encounter with the resident.  I agree with the assessment, plan and orders as documented by the resident.  (GE Modifier)    Past Medical History:   Diagnosis Date    Anxiety     Asthma 7/12/2019    Depression 2/25/2022    Seizures (HCC)        Vitals:    03/20/25 1539   BP: 123/84   Pulse: (!) 112       Livia was seen today for knee injury.    Diagnoses and all orders for this visit:    Acute pain of right knee

## 2025-04-10 ENCOUNTER — TELEPHONE (OUTPATIENT)
Dept: FAMILY MEDICINE CLINIC | Age: 30
End: 2025-04-10

## 2025-04-10 NOTE — TELEPHONE ENCOUNTER
Writer called patient this morning to inform her that our office is unable to fill out her FMLA due to we didn't take her off work and Ortho is doing the surgery. Patient states Ortho will not fill out the forms before 4/15/2025, but again we didn't not take her off.  Writer will scan blank copy into chart. Writer was going to offer to fax to Ortho for her but patient ended call.

## 2025-04-11 ENCOUNTER — TELEPHONE (OUTPATIENT)
Dept: ORTHOPEDIC SURGERY | Age: 30
End: 2025-04-11

## 2025-04-11 NOTE — TELEPHONE ENCOUNTER
Blank Forms Received and Scanned into media. Paperwork will be filled out and signed by provider and faxed to the patient's employer.

## 2025-04-11 NOTE — TELEPHONE ENCOUNTER
Patient is faxing over [facility] paperwork today, will need fill out and return to her job by 4/18/25 Friday.     Dos 5/16/25

## 2025-04-14 NOTE — TELEPHONE ENCOUNTER
Patient's FMLA forms have been completed and ready for Dr. Mathtews to dign.     He will not be back in the [place] office until next Friday, 4/25/25.    Will have the [place] office get him to print and sign it for tomorrow.

## 2025-04-16 ENCOUNTER — TELEPHONE (OUTPATIENT)
Dept: ORTHOPEDIC SURGERY | Age: 30
End: 2025-04-16

## 2025-04-16 NOTE — TELEPHONE ENCOUNTER
Patient called in regarding disability paperwork. Informed her that paperwork was completed and faxed. She requested it be faxed to S and to include her case number. Paperwork faxed

## 2025-05-05 ENCOUNTER — HOSPITAL ENCOUNTER (OUTPATIENT)
Dept: PREADMISSION TESTING | Age: 30
Discharge: HOME | End: 2025-05-09

## 2025-05-05 VITALS
SYSTOLIC BLOOD PRESSURE: 104 MMHG | WEIGHT: 150 LBS | RESPIRATION RATE: 18 BRPM | OXYGEN SATURATION: 99 % | HEART RATE: 78 BPM | HEIGHT: 62 IN | TEMPERATURE: 97.5 F | DIASTOLIC BLOOD PRESSURE: 70 MMHG | BODY MASS INDEX: 27.6 KG/M2

## 2025-05-05 DIAGNOSIS — Z01.818 PREOPERATIVE TESTING: ICD-10-CM

## 2025-05-05 LAB
ALBUMIN SERPL-MCNC: 4.2 G/DL (ref 3.5–5.2)
ALBUMIN/GLOB SERPL: 1.4 {RATIO} (ref 1–2.5)
ALP SERPL-CCNC: 92 U/L (ref 35–104)
ALT SERPL-CCNC: 44 U/L (ref 10–35)
ANION GAP SERPL CALCULATED.3IONS-SCNC: 10 MMOL/L (ref 9–16)
AST SERPL-CCNC: 27 U/L (ref 10–35)
BILIRUB SERPL-MCNC: 0.2 MG/DL (ref 0–1.2)
BILIRUB UR QL STRIP: NEGATIVE
BUN SERPL-MCNC: 9 MG/DL (ref 6–20)
CALCIUM SERPL-MCNC: 9 MG/DL (ref 8.6–10.4)
CHLORIDE SERPL-SCNC: 106 MMOL/L (ref 98–107)
CLARITY UR: CLEAR
CO2 SERPL-SCNC: 26 MMOL/L (ref 20–31)
COLOR UR: YELLOW
COMMENT: NORMAL
CREAT SERPL-MCNC: 0.9 MG/DL (ref 0.6–0.9)
ERYTHROCYTE [DISTWIDTH] IN BLOOD BY AUTOMATED COUNT: 12.8 % (ref 11.8–14.4)
GFR, ESTIMATED: 88 ML/MIN/1.73M2
GLUCOSE SERPL-MCNC: 76 MG/DL (ref 74–99)
GLUCOSE UR STRIP-MCNC: NEGATIVE MG/DL
HCT VFR BLD AUTO: 40.7 % (ref 36.3–47.1)
HGB BLD-MCNC: 12.9 G/DL (ref 11.9–15.1)
HGB UR QL STRIP.AUTO: NEGATIVE
KETONES UR STRIP-MCNC: NEGATIVE MG/DL
LEUKOCYTE ESTERASE UR QL STRIP: NEGATIVE
MCH RBC QN AUTO: 31.3 PG (ref 25.2–33.5)
MCHC RBC AUTO-ENTMCNC: 31.7 G/DL (ref 28.4–34.8)
MCV RBC AUTO: 98.8 FL (ref 82.6–102.9)
NITRITE UR QL STRIP: NEGATIVE
NRBC BLD-RTO: 0 PER 100 WBC
PH UR STRIP: 6.5 [PH] (ref 5–8)
PLATELET # BLD AUTO: 278 K/UL (ref 138–453)
PMV BLD AUTO: 10.1 FL (ref 8.1–13.5)
POTASSIUM SERPL-SCNC: 4 MMOL/L (ref 3.7–5.3)
PROT SERPL-MCNC: 7.1 G/DL (ref 6.6–8.7)
PROT UR STRIP-MCNC: NEGATIVE MG/DL
RBC # BLD AUTO: 4.12 M/UL (ref 3.95–5.11)
SODIUM SERPL-SCNC: 142 MMOL/L (ref 136–145)
SP GR UR STRIP: 1.01 (ref 1–1.03)
UROBILINOGEN UR STRIP-ACNC: NORMAL EU/DL (ref 0–1)
WBC OTHER # BLD: 7.4 K/UL (ref 3.5–11.3)

## 2025-05-05 PROCEDURE — 80053 COMPREHEN METABOLIC PANEL: CPT

## 2025-05-05 PROCEDURE — 36415 COLL VENOUS BLD VENIPUNCTURE: CPT

## 2025-05-05 PROCEDURE — 85027 COMPLETE CBC AUTOMATED: CPT

## 2025-05-05 PROCEDURE — 93005 ELECTROCARDIOGRAM TRACING: CPT

## 2025-05-05 PROCEDURE — 81003 URINALYSIS AUTO W/O SCOPE: CPT

## 2025-05-05 RX ORDER — CEFAZOLIN SODIUM/WATER 2 G/20 ML
2000 SYRINGE (ML) INTRAVENOUS ONCE
OUTPATIENT
Start: 2025-05-16

## 2025-05-05 ASSESSMENT — PAIN DESCRIPTION - PAIN TYPE: TYPE: ACUTE PAIN

## 2025-05-05 ASSESSMENT — PAIN DESCRIPTION - FREQUENCY: FREQUENCY: CONTINUOUS

## 2025-05-05 ASSESSMENT — PAIN - FUNCTIONAL ASSESSMENT: PAIN_FUNCTIONAL_ASSESSMENT: PREVENTS OR INTERFERES SOME ACTIVE ACTIVITIES AND ADLS

## 2025-05-05 ASSESSMENT — PAIN DESCRIPTION - DESCRIPTORS: DESCRIPTORS: STABBING;ACHING;DULL

## 2025-05-05 ASSESSMENT — PAIN DESCRIPTION - LOCATION: LOCATION: KNEE

## 2025-05-05 ASSESSMENT — PAIN DESCRIPTION - ORIENTATION: ORIENTATION: RIGHT

## 2025-05-05 ASSESSMENT — PAIN DESCRIPTION - ONSET: ONSET: AWAKENED FROM SLEEP

## 2025-05-05 NOTE — PRE-PROCEDURE INSTRUCTIONS
On the Day of Your Surgery Friday May 16th arrive at 11:30am at [facility]   Enter the hospital through the Main Entrance, take the lobby elevators to the second floor and check in at the Surgery Registration desk.     Continue to take your home medications as you normally do up to and including the night before surgery with the exception of blood thinning medications.    Blood Thinning Medications:  Please stop prescription blood thinning medications such as Apixaban (Eliquis); Clopidogrel (Plavix); Dabigatran (Pradaxa); Prasugrel (Effient); Rivaroxaban (Xarelto); Ticagrelor (Brilinta); Warfarin (Coumadin) only as directed by your surgeon and/or the prescribing physician    Some common examples of other medications that can thin your blood are: Aspirin, Ibuprofen (Advil, Motrin), Naproxen (Aleve), Meloxicam (Mobic), Celecoxib (Celebrex), Fish Oil, many Herbal Supplements.  These medications should usually be stopped at least 7 days prior to surgery.       Stop using OTC pain relievers containing Aspirin, Ibuprofen (Advil, Motrin) or Naproxen (Aleve) seven days prior to surgery.  It is OK to continue using Tylenol for pain the week prior to surgery.    Failure to stop certain medications may interfere with your scheduled surgery.    If you receive instructions from your surgeon regarding what medications to stop prior to surgery, please follow those specific instructions.      Please take the following medication(s) the day of surgery with small sips of water:            Keppra,and omeprazole    Please use your inhaler(s) if needed and bring your inhaler(s) from home the day of surgery.    Showering Before Surgery:     You can play an important role in your own health by carefully washing before surgery. Shower the night before and the morning of surgery using the instructions below. If you are allergic to Chlorhexidine Gluconate (CHG) use antibacterial soap instead.    If you were given Chlorhexidine soap,

## 2025-05-06 ENCOUNTER — ANESTHESIA EVENT (OUTPATIENT)
Dept: OPERATING ROOM | Age: 30
End: 2025-05-06
Payer: MEDICAID

## 2025-05-06 LAB
EKG ATRIAL RATE: 67 BPM
EKG P AXIS: 54 DEGREES
EKG P-R INTERVAL: 148 MS
EKG Q-T INTERVAL: 380 MS
EKG QRS DURATION: 74 MS
EKG QTC CALCULATION (BAZETT): 401 MS
EKG R AXIS: 29 DEGREES
EKG T AXIS: 14 DEGREES
EKG VENTRICULAR RATE: 67 BPM

## 2025-05-16 ENCOUNTER — ANESTHESIA (OUTPATIENT)
Dept: OPERATING ROOM | Age: 30
End: 2025-05-16
Payer: MEDICAID

## 2025-05-16 ENCOUNTER — HOSPITAL ENCOUNTER (OUTPATIENT)
Age: 30
Setting detail: OUTPATIENT SURGERY
Discharge: HOME | End: 2025-05-16
Attending: ORTHOPAEDIC SURGERY | Admitting: ORTHOPAEDIC SURGERY
Payer: MEDICAID

## 2025-05-16 ENCOUNTER — APPOINTMENT (OUTPATIENT)
Dept: GENERAL RADIOLOGY | Age: 30
End: 2025-05-16
Attending: ORTHOPAEDIC SURGERY
Payer: MEDICAID

## 2025-05-16 VITALS
BODY MASS INDEX: 27.71 KG/M2 | OXYGEN SATURATION: 100 % | WEIGHT: 150.6 LBS | HEART RATE: 67 BPM | SYSTOLIC BLOOD PRESSURE: 153 MMHG | TEMPERATURE: 97.9 F | RESPIRATION RATE: 17 BRPM | HEIGHT: 62 IN | DIASTOLIC BLOOD PRESSURE: 98 MMHG

## 2025-05-16 DIAGNOSIS — G89.18 POSTOPERATIVE PAIN: Primary | ICD-10-CM

## 2025-05-16 PROCEDURE — 29888 ARTHRS AID ACL RPR/AGMNTJ: CPT | Performed by: ORTHOPAEDIC SURGERY

## 2025-05-16 PROCEDURE — 2720000010 HC SURG SUPPLY STERILE: Performed by: ORTHOPAEDIC SURGERY

## 2025-05-16 PROCEDURE — 6370000000 HC RX 637 (ALT 250 FOR IP): Performed by: ANESTHESIOLOGY

## 2025-05-16 PROCEDURE — 7100000010 HC PHASE II RECOVERY - FIRST 15 MIN: Performed by: ORTHOPAEDIC SURGERY

## 2025-05-16 PROCEDURE — 27427 RECONSTRUCTION KNEE: CPT | Performed by: ORTHOPAEDIC SURGERY

## 2025-05-16 PROCEDURE — 2709999900 HC NON-CHARGEABLE SUPPLY: Performed by: ORTHOPAEDIC SURGERY

## 2025-05-16 PROCEDURE — 6360000002 HC RX W HCPCS: Performed by: ORTHOPAEDIC SURGERY

## 2025-05-16 PROCEDURE — 2580000003 HC RX 258: Performed by: NURSE ANESTHETIST, CERTIFIED REGISTERED

## 2025-05-16 PROCEDURE — 6360000002 HC RX W HCPCS: Performed by: ANESTHESIOLOGY

## 2025-05-16 PROCEDURE — C1713 ANCHOR/SCREW BN/BN,TIS/BN: HCPCS | Performed by: ORTHOPAEDIC SURGERY

## 2025-05-16 PROCEDURE — 7100000011 HC PHASE II RECOVERY - ADDTL 15 MIN: Performed by: ORTHOPAEDIC SURGERY

## 2025-05-16 PROCEDURE — 7100000000 HC PACU RECOVERY - FIRST 15 MIN: Performed by: ORTHOPAEDIC SURGERY

## 2025-05-16 PROCEDURE — 3600000013 HC SURGERY LEVEL 3 ADDTL 15MIN: Performed by: ORTHOPAEDIC SURGERY

## 2025-05-16 PROCEDURE — 2500000003 HC RX 250 WO HCPCS: Performed by: ORTHOPAEDIC SURGERY

## 2025-05-16 PROCEDURE — 29889 ARTHRS AID PCL RPR/AGMNTJ: CPT | Performed by: ORTHOPAEDIC SURGERY

## 2025-05-16 PROCEDURE — 81025 URINE PREGNANCY TEST: CPT

## 2025-05-16 PROCEDURE — 6360000002 HC RX W HCPCS: Performed by: NURSE ANESTHETIST, CERTIFIED REGISTERED

## 2025-05-16 PROCEDURE — 2500000003 HC RX 250 WO HCPCS: Performed by: NURSE ANESTHETIST, CERTIFIED REGISTERED

## 2025-05-16 PROCEDURE — 3600000003 HC SURGERY LEVEL 3 BASE: Performed by: ORTHOPAEDIC SURGERY

## 2025-05-16 PROCEDURE — 7100000001 HC PACU RECOVERY - ADDTL 15 MIN: Performed by: ORTHOPAEDIC SURGERY

## 2025-05-16 PROCEDURE — 3700000001 HC ADD 15 MINUTES (ANESTHESIA): Performed by: ORTHOPAEDIC SURGERY

## 2025-05-16 PROCEDURE — 64447 NJX AA&/STRD FEMORAL NRV IMG: CPT | Performed by: ANESTHESIOLOGY

## 2025-05-16 PROCEDURE — 3700000000 HC ANESTHESIA ATTENDED CARE: Performed by: ORTHOPAEDIC SURGERY

## 2025-05-16 DEVICE — SCREW INTFR L30MM DIA10MM VENT BIOCOMPOSITE: Type: IMPLANTABLE DEVICE | Site: KNEE | Status: FUNCTIONAL

## 2025-05-16 DEVICE — GRAFT HUM TISS SEMITENDINOSUS TEND NONIRRADIATED FRZN: Type: IMPLANTABLE DEVICE | Site: KNEE | Status: FUNCTIONAL

## 2025-05-16 DEVICE — DEVICE GRFT FIX 4.75X19.1 MM BIOCOMPOSITE SWIVELOCK: Type: IMPLANTABLE DEVICE | Site: KNEE | Status: FUNCTIONAL

## 2025-05-16 DEVICE — ANCHOR SUTURE L 24.5 MM DIA 4.75 MM BIOCOMP TI TIP PEEK WHT: Type: IMPLANTABLE DEVICE | Site: KNEE | Status: FUNCTIONAL

## 2025-05-16 DEVICE — TIGHTROPE II RT RECON IB: Type: IMPLANTABLE DEVICE | Site: KNEE | Status: FUNCTIONAL

## 2025-05-16 RX ORDER — GENTAMICIN 40 MG/ML
INJECTION, SOLUTION INTRAMUSCULAR; INTRAVENOUS
Status: DISCONTINUED
Start: 2025-05-16 | End: 2025-05-16 | Stop reason: HOSPADM

## 2025-05-16 RX ORDER — ONDANSETRON 2 MG/ML
INJECTION INTRAMUSCULAR; INTRAVENOUS
Status: DISCONTINUED | OUTPATIENT
Start: 2025-05-16 | End: 2025-05-16 | Stop reason: SDUPTHER

## 2025-05-16 RX ORDER — IBUPROFEN 800 MG/1
800 TABLET, FILM COATED ORAL
Qty: 90 TABLET | Refills: 0 | Status: SHIPPED | OUTPATIENT
Start: 2025-05-16

## 2025-05-16 RX ORDER — DIPHENHYDRAMINE HYDROCHLORIDE 50 MG/ML
12.5 INJECTION, SOLUTION INTRAMUSCULAR; INTRAVENOUS
Status: DISCONTINUED | OUTPATIENT
Start: 2025-05-16 | End: 2025-05-16 | Stop reason: HOSPADM

## 2025-05-16 RX ORDER — GENTAMICIN 40 MG/ML
INJECTION, SOLUTION INTRAMUSCULAR; INTRAVENOUS PRN
Status: DISCONTINUED | OUTPATIENT
Start: 2025-05-16 | End: 2025-05-16 | Stop reason: ALTCHOICE

## 2025-05-16 RX ORDER — PROCHLORPERAZINE EDISYLATE 5 MG/ML
10 INJECTION INTRAMUSCULAR; INTRAVENOUS
Status: DISCONTINUED | OUTPATIENT
Start: 2025-05-16 | End: 2025-05-16 | Stop reason: HOSPADM

## 2025-05-16 RX ORDER — MIDAZOLAM HYDROCHLORIDE 1 MG/ML
INJECTION, SOLUTION INTRAMUSCULAR; INTRAVENOUS
Status: DISCONTINUED | OUTPATIENT
Start: 2025-05-16 | End: 2025-05-16 | Stop reason: SDUPTHER

## 2025-05-16 RX ORDER — LIDOCAINE HYDROCHLORIDE 10 MG/ML
INJECTION, SOLUTION INFILTRATION; PERINEURAL
Status: DISCONTINUED | OUTPATIENT
Start: 2025-05-16 | End: 2025-05-16 | Stop reason: SDUPTHER

## 2025-05-16 RX ORDER — NALOXONE HYDROCHLORIDE 0.4 MG/ML
INJECTION, SOLUTION INTRAMUSCULAR; INTRAVENOUS; SUBCUTANEOUS PRN
Status: DISCONTINUED | OUTPATIENT
Start: 2025-05-16 | End: 2025-05-16 | Stop reason: HOSPADM

## 2025-05-16 RX ORDER — OXYCODONE HYDROCHLORIDE 5 MG/1
5 TABLET ORAL
Status: COMPLETED | OUTPATIENT
Start: 2025-05-16 | End: 2025-05-16

## 2025-05-16 RX ORDER — DEXAMETHASONE SODIUM PHOSPHATE 4 MG/ML
INJECTION, SOLUTION INTRA-ARTICULAR; INTRALESIONAL; INTRAMUSCULAR; INTRAVENOUS; SOFT TISSUE
Status: DISCONTINUED | OUTPATIENT
Start: 2025-05-16 | End: 2025-05-16 | Stop reason: SDUPTHER

## 2025-05-16 RX ORDER — DEXAMETHASONE SODIUM PHOSPHATE 10 MG/ML
INJECTION, SOLUTION INTRAMUSCULAR; INTRAVENOUS
Status: DISCONTINUED | OUTPATIENT
Start: 2025-05-16 | End: 2025-05-16 | Stop reason: SDUPTHER

## 2025-05-16 RX ORDER — OXYCODONE AND ACETAMINOPHEN 5; 325 MG/1; MG/1
1 TABLET ORAL EVERY 6 HOURS PRN
Qty: 27 TABLET | Refills: 0 | Status: SHIPPED | OUTPATIENT
Start: 2025-05-16 | End: 2025-05-23

## 2025-05-16 RX ORDER — LIDOCAINE HYDROCHLORIDE 20 MG/ML
INJECTION, SOLUTION EPIDURAL; INFILTRATION; INTRACAUDAL; PERINEURAL
Status: DISCONTINUED | OUTPATIENT
Start: 2025-05-16 | End: 2025-05-16 | Stop reason: SDUPTHER

## 2025-05-16 RX ORDER — SODIUM CHLORIDE 9 MG/ML
INJECTION, SOLUTION INTRAVENOUS PRN
Status: DISCONTINUED | OUTPATIENT
Start: 2025-05-16 | End: 2025-05-16 | Stop reason: HOSPADM

## 2025-05-16 RX ORDER — SODIUM CHLORIDE 0.9 % (FLUSH) 0.9 %
5-40 SYRINGE (ML) INJECTION EVERY 12 HOURS SCHEDULED
Status: DISCONTINUED | OUTPATIENT
Start: 2025-05-16 | End: 2025-05-16 | Stop reason: HOSPADM

## 2025-05-16 RX ORDER — MEPERIDINE HYDROCHLORIDE 50 MG/ML
12.5 INJECTION INTRAMUSCULAR; INTRAVENOUS; SUBCUTANEOUS EVERY 5 MIN PRN
Status: DISCONTINUED | OUTPATIENT
Start: 2025-05-16 | End: 2025-05-16 | Stop reason: HOSPADM

## 2025-05-16 RX ORDER — KETOROLAC TROMETHAMINE 30 MG/ML
30 INJECTION, SOLUTION INTRAMUSCULAR; INTRAVENOUS ONCE
Status: COMPLETED | OUTPATIENT
Start: 2025-05-16 | End: 2025-05-16

## 2025-05-16 RX ORDER — PROPOFOL 10 MG/ML
INJECTION, EMULSION INTRAVENOUS
Status: DISCONTINUED | OUTPATIENT
Start: 2025-05-16 | End: 2025-05-16 | Stop reason: SDUPTHER

## 2025-05-16 RX ORDER — SODIUM CHLORIDE 9 MG/ML
INJECTION, SOLUTION INTRAVENOUS CONTINUOUS
Status: DISCONTINUED | OUTPATIENT
Start: 2025-05-16 | End: 2025-05-16

## 2025-05-16 RX ORDER — BUPIVACAINE HYDROCHLORIDE 2.5 MG/ML
INJECTION, SOLUTION EPIDURAL; INFILTRATION; INTRACAUDAL; PERINEURAL
Status: DISCONTINUED | OUTPATIENT
Start: 2025-05-16 | End: 2025-05-16 | Stop reason: SDUPTHER

## 2025-05-16 RX ORDER — SODIUM CHLORIDE, SODIUM LACTATE, POTASSIUM CHLORIDE, CALCIUM CHLORIDE 600; 310; 30; 20 MG/100ML; MG/100ML; MG/100ML; MG/100ML
INJECTION, SOLUTION INTRAVENOUS
Status: DISCONTINUED | OUTPATIENT
Start: 2025-05-16 | End: 2025-05-16 | Stop reason: SDUPTHER

## 2025-05-16 RX ORDER — SODIUM CHLORIDE, SODIUM LACTATE, POTASSIUM CHLORIDE, CALCIUM CHLORIDE 600; 310; 30; 20 MG/100ML; MG/100ML; MG/100ML; MG/100ML
INJECTION, SOLUTION INTRAVENOUS CONTINUOUS
Status: DISCONTINUED | OUTPATIENT
Start: 2025-05-16 | End: 2025-05-16 | Stop reason: HOSPADM

## 2025-05-16 RX ORDER — MIDAZOLAM HYDROCHLORIDE 2 MG/2ML
2 INJECTION, SOLUTION INTRAMUSCULAR; INTRAVENOUS ONCE
Status: COMPLETED | OUTPATIENT
Start: 2025-05-16 | End: 2025-05-16

## 2025-05-16 RX ORDER — LIDOCAINE HYDROCHLORIDE 10 MG/ML
1 INJECTION, SOLUTION EPIDURAL; INFILTRATION; INTRACAUDAL; PERINEURAL
Status: DISCONTINUED | OUTPATIENT
Start: 2025-05-16 | End: 2025-05-16 | Stop reason: HOSPADM

## 2025-05-16 RX ORDER — ASPIRIN 325 MG
325 TABLET ORAL DAILY
Qty: 7 TABLET | Refills: 0 | Status: SHIPPED | OUTPATIENT
Start: 2025-05-16 | End: 2025-05-23

## 2025-05-16 RX ORDER — SODIUM CHLORIDE 0.9 % (FLUSH) 0.9 %
5-40 SYRINGE (ML) INJECTION PRN
Status: DISCONTINUED | OUTPATIENT
Start: 2025-05-16 | End: 2025-05-16 | Stop reason: HOSPADM

## 2025-05-16 RX ORDER — ROCURONIUM BROMIDE 10 MG/ML
INJECTION, SOLUTION INTRAVENOUS
Status: DISCONTINUED | OUTPATIENT
Start: 2025-05-16 | End: 2025-05-16 | Stop reason: SDUPTHER

## 2025-05-16 RX ORDER — FENTANYL CITRATE 50 UG/ML
25 INJECTION, SOLUTION INTRAMUSCULAR; INTRAVENOUS EVERY 5 MIN PRN
Status: DISCONTINUED | OUTPATIENT
Start: 2025-05-16 | End: 2025-05-16 | Stop reason: HOSPADM

## 2025-05-16 RX ORDER — HYDROMORPHONE HYDROCHLORIDE 1 MG/ML
1 INJECTION, SOLUTION INTRAMUSCULAR; INTRAVENOUS; SUBCUTANEOUS ONCE
Status: COMPLETED | OUTPATIENT
Start: 2025-05-16 | End: 2025-05-16

## 2025-05-16 RX ORDER — HYDROMORPHONE HYDROCHLORIDE 1 MG/ML
0.5 INJECTION, SOLUTION INTRAMUSCULAR; INTRAVENOUS; SUBCUTANEOUS EVERY 5 MIN PRN
Status: DISCONTINUED | OUTPATIENT
Start: 2025-05-16 | End: 2025-05-16 | Stop reason: HOSPADM

## 2025-05-16 RX ORDER — CEFAZOLIN SODIUM/WATER 2 G/20 ML
2000 SYRINGE (ML) INTRAVENOUS ONCE
Status: COMPLETED | OUTPATIENT
Start: 2025-05-16 | End: 2025-05-16

## 2025-05-16 RX ORDER — METOCLOPRAMIDE HYDROCHLORIDE 5 MG/ML
10 INJECTION INTRAMUSCULAR; INTRAVENOUS
Status: DISCONTINUED | OUTPATIENT
Start: 2025-05-16 | End: 2025-05-16 | Stop reason: HOSPADM

## 2025-05-16 RX ORDER — LIDOCAINE HYDROCHLORIDE AND EPINEPHRINE 10; 10 MG/ML; UG/ML
INJECTION, SOLUTION INFILTRATION; PERINEURAL PRN
Status: DISCONTINUED | OUTPATIENT
Start: 2025-05-16 | End: 2025-05-16 | Stop reason: ALTCHOICE

## 2025-05-16 RX ORDER — FENTANYL CITRATE 50 UG/ML
INJECTION, SOLUTION INTRAMUSCULAR; INTRAVENOUS
Status: DISCONTINUED | OUTPATIENT
Start: 2025-05-16 | End: 2025-05-16 | Stop reason: SDUPTHER

## 2025-05-16 RX ADMIN — ONDANSETRON 4 MG: 2 INJECTION, SOLUTION INTRAMUSCULAR; INTRAVENOUS at 15:08

## 2025-05-16 RX ADMIN — LIDOCAINE HYDROCHLORIDE 60 MG: 20 INJECTION, SOLUTION EPIDURAL; INFILTRATION; INTRACAUDAL; PERINEURAL at 12:37

## 2025-05-16 RX ADMIN — FENTANYL CITRATE 100 MCG: 50 INJECTION INTRAMUSCULAR; INTRAVENOUS at 12:37

## 2025-05-16 RX ADMIN — DEXAMETHASONE SODIUM PHOSPHATE 4 MG: 4 INJECTION, SOLUTION INTRAMUSCULAR; INTRAVENOUS at 11:27

## 2025-05-16 RX ADMIN — SUGAMMADEX 140 MG: 100 INJECTION, SOLUTION INTRAVENOUS at 15:09

## 2025-05-16 RX ADMIN — PROPOFOL 150 MG: 10 INJECTION, EMULSION INTRAVENOUS at 12:37

## 2025-05-16 RX ADMIN — MIDAZOLAM 2 MG: 1 INJECTION INTRAMUSCULAR; INTRAVENOUS at 17:08

## 2025-05-16 RX ADMIN — LIDOCAINE HYDROCHLORIDE 3 ML: 10 INJECTION, SOLUTION INFILTRATION; PERINEURAL at 11:27

## 2025-05-16 RX ADMIN — BUPIVACAINE HYDROCHLORIDE 30 ML: 2.5 INJECTION, SOLUTION EPIDURAL; INFILTRATION; INTRACAUDAL; PERINEURAL at 11:27

## 2025-05-16 RX ADMIN — FENTANYL CITRATE 100 MCG: 50 INJECTION INTRAMUSCULAR; INTRAVENOUS at 16:10

## 2025-05-16 RX ADMIN — HYDROMORPHONE HYDROCHLORIDE 1 MG: 1 INJECTION, SOLUTION INTRAMUSCULAR; INTRAVENOUS; SUBCUTANEOUS at 16:48

## 2025-05-16 RX ADMIN — FENTANYL CITRATE 50 MCG: 50 INJECTION INTRAMUSCULAR; INTRAVENOUS at 13:30

## 2025-05-16 RX ADMIN — MIDAZOLAM 2 MG: 1 INJECTION INTRAMUSCULAR; INTRAVENOUS at 12:32

## 2025-05-16 RX ADMIN — FENTANYL CITRATE 50 MCG: 50 INJECTION INTRAMUSCULAR; INTRAVENOUS at 15:29

## 2025-05-16 RX ADMIN — KETOROLAC TROMETHAMINE 30 MG: 30 INJECTION, SOLUTION INTRAMUSCULAR at 17:42

## 2025-05-16 RX ADMIN — MIDAZOLAM 2 MG: 1 INJECTION INTRAMUSCULAR; INTRAVENOUS at 11:24

## 2025-05-16 RX ADMIN — Medication 2000 MG: at 12:50

## 2025-05-16 RX ADMIN — OXYCODONE HYDROCHLORIDE 5 MG: 5 TABLET ORAL at 17:59

## 2025-05-16 RX ADMIN — ROCURONIUM BROMIDE 50 MG: 10 INJECTION, SOLUTION INTRAVENOUS at 12:37

## 2025-05-16 RX ADMIN — SODIUM CHLORIDE, POTASSIUM CHLORIDE, SODIUM LACTATE AND CALCIUM CHLORIDE: 600; 310; 30; 20 INJECTION, SOLUTION INTRAVENOUS at 12:32

## 2025-05-16 RX ADMIN — DEXAMETHASONE SODIUM PHOSPHATE 10 MG: 10 INJECTION INTRAMUSCULAR; INTRAVENOUS at 12:45

## 2025-05-16 RX ADMIN — FENTANYL CITRATE 50 MCG: 50 INJECTION INTRAMUSCULAR; INTRAVENOUS at 15:05

## 2025-05-16 RX ADMIN — FENTANYL CITRATE 50 MCG: 50 INJECTION INTRAMUSCULAR; INTRAVENOUS at 14:34

## 2025-05-16 ASSESSMENT — PAIN DESCRIPTION - LOCATION
LOCATION: KNEE
LOCATION: KNEE
LOCATION: LEG
LOCATION: KNEE

## 2025-05-16 ASSESSMENT — PAIN SCALES - GENERAL
PAINLEVEL_OUTOF10: 10
PAINLEVEL_OUTOF10: 6

## 2025-05-16 ASSESSMENT — PAIN DESCRIPTION - ORIENTATION
ORIENTATION: RIGHT;POSTERIOR
ORIENTATION: RIGHT

## 2025-05-16 ASSESSMENT — ENCOUNTER SYMPTOMS
RHINORRHEA: 0
ABDOMINAL DISTENTION: 0
DIARRHEA: 0
CHEST TIGHTNESS: 0
VOMITING: 0
WHEEZING: 0
SORE THROAT: 0
CONSTIPATION: 0
COUGH: 0
EYES NEGATIVE: 1
SHORTNESS OF BREATH: 0
ABDOMINAL PAIN: 0
NAUSEA: 0

## 2025-05-16 ASSESSMENT — PAIN DESCRIPTION - DESCRIPTORS
DESCRIPTORS: STABBING
DESCRIPTORS: OTHER (COMMENT)

## 2025-05-16 ASSESSMENT — LIFESTYLE VARIABLES: SMOKING_STATUS: 1

## 2025-05-16 ASSESSMENT — PAIN DESCRIPTION - PAIN TYPE: TYPE: ACUTE PAIN

## 2025-05-16 ASSESSMENT — PAIN - FUNCTIONAL ASSESSMENT: PAIN_FUNCTIONAL_ASSESSMENT: FACE, LEGS, ACTIVITY, CRY, AND CONSOLABILITY (FLACC)

## 2025-05-16 ASSESSMENT — PAIN DESCRIPTION - FREQUENCY: FREQUENCY: CONTINUOUS

## 2025-05-16 NOTE — ANESTHESIA PROCEDURE NOTES
Peripheral Block    Patient location during procedure: pre-op  Reason for block: post-op pain management and at surgeon's request  Start time: 5/16/2025 11:23 AM  End time: 5/16/2025 11:28 AM  Staffing  Performed: anesthesiologist   Anesthesiologist: Brock Cho DO  Performed by: Brock Cho DO  Authorized by: Brock Cho DO    Preanesthetic Checklist  Completed: patient identified, IV checked, site marked, risks and benefits discussed, surgical/procedural consents, equipment checked, pre-op evaluation, timeout performed, anesthesia consent given, oxygen available, monitors applied/VS acknowledged, fire risk safety assessment completed and verbalized and blood product R/B/A discussed and consented  Peripheral Block   Patient position: supine  Prep: ChloraPrep  Provider prep: mask and sterile gown  Patient monitoring: cardiac monitor, continuous pulse ox, frequent blood pressure checks, IV access, oxygen and responsive to questions  Block type: Femoral  Adductor canal  Laterality: right  Injection technique: single-shot  Guidance: ultrasound guided  Local infiltration: lidocaine  Infiltration strength: 1 %  Local infiltration: lidocaine  Dose: 3 mL    Needle   Needle type: insulated echogenic nerve stimulator needle   Needle gauge: 21 G  Needle localization: ultrasound guidance  Needle length: 10 cm  Assessment   Injection assessment: negative aspiration for heme, no paresthesia on injection, local visualized surrounding nerve on ultrasound and no intravascular symptoms  Paresthesia pain: none  Slow fractionated injection: yes  Hemodynamics: stable  Outcomes: uncomplicated    Additional Notes  Right adductor canal single shot  30ml 0.25% bupivacaine + 4mg decadron

## 2025-05-16 NOTE — OP NOTE
Operative Note      Patient: Livia Ferrer  YOB: 1995  MRN: [id number]    Date of Procedure: 5/16/2025    Pre-Op Diagnosis Codes:      * Complete tear of anterior cruciate ligament of right knee [S83.511A]     * Right knee injury, initial encounter [S89.91XA]     * Posterior subluxation of proximal end of right tibia, initial encounter [S83.121A]     * Posterolateral complex injury, right, initial encounter [S89.91XA]    Post-Op Diagnosis: Post-Op Diagnosis Codes:     * Complete tear of anterior cruciate ligament of right knee [S83.511A]     * Right knee injury, initial encounter [S89.91XA]     * Posterior subluxation of proximal end of right tibia, initial encounter [S83.121A]     * Posterolateral complex injury, right, initial encounter [S89.91XA]  Right knee lateral collateral ligament injury  Right knee PCL injury       Procedure(s):  RIGHT KNEE ARTHROSCOPY ACL, PCL, LCL, POSTEROLATERAL RECONSTRUCTION WITH ALLOGRAFT (ARTHREX, FEMORAL NERVE, SUPINE, C-ARM)    Surgeon(s):  Thee Matthews MD    Assistant:   Resident: Amanuel Tam MD    Anesthesia: General    Estimated Blood Loss (mL): Minimal    Complications: None    Specimens:   * No specimens in log *    Implants:  Implant Name Type Inv. Item Serial No.  Lot No. LRB No. Used Action   TIGHTROPE II RT RECON IB - [id number]  TIGHTROPE II RT RECON IB  ARTHREX [facility] [id number] Right 1 Implanted   GRAFT HUM TISS SEMITENDINOSUS TEND NONIRRADIATED ZN - [id number]  GRAFT HUM TISS SEMITENDINOSUS TEND NONIRRADIATED ZN [id number] [employer] [facility] [facility] [id number] Right 1 Implanted   TIGHTROPE II RT RECON IB - [id number]  TIGHTROPE II RT RECON IB  [employer]X [facility] [id number] Right 1 Implanted   GRAFT HUM TISS SEMITENDINOSUS TEND NONIRRADIATED FRZN - [id number]  GRAFT HUM TISS SEMITENDINOSUS TEND NONIRRADIATED ZN [id number] [employer]GICAL [facility] [id number] Right 1 Implanted   GRAFT HUM TISS SEMITENDINOSUS TEND NONIRRADIATED FRZN - [id number]  GRAFT HUM TISS

## 2025-05-16 NOTE — H&P
History and Physical Service   [facility]    HISTORY AND PHYSICAL EXAMINATION            Date of Evaluation: 5/16/2025  Patient name:  Livia Ferrer  MRN:   [id number]  YOB: 1995  PCP:    Maryann Sandy MD    History Obtained From:     Patient, medical records    History of Present Illness:     This is Livia Ferrer a 30 y.o. female who presents today for a RIGHT KNEE ARTHROSCOPY ACL, PCL, LCL, POSTEROLATERAL RECONSTRUCTION WITH ALLOGRAFT (ARTHREX, FEMORAL NERVE, SUPINE, C-ARM) by Thee Matthews MD for Complete tear of anterior cruciate ligament of right knee; Right knee inju*. Patient reports she tripped and fell in March 2025 bending the knee backward and twisting causing immediate pain. She had an MRI completed 03/20/2025 demonstrating completer ACL, PCL, LCL, and posterior lateral corner injuries. Full report listed below. Denies any pain today. Denies fever, chills, shortness of breath, cough, congestion, wheezing, chest pain, open sores or wounds. Denies hx of diabetes. Denies any current blood thinning medications.     Past Medical History:     Past Medical History:   Diagnosis Date    Anxiety     no treatment    Asthma 07/12/2019    inhaler as needed, no pulmonary doctor    Depression 02/25/2022    no treatment. sees a counselor    GERD (gastroesophageal reflux disease)     on prilosec    Seizures (HCC)     absent,tonic clonic \"looking for a new Neurologist\"  PCP is ordering her Keppra. last episode was in 1/2025 per patient        Past Surgical History:     Past Surgical History:   Procedure Laterality Date    BRAIN SURGERY      tumor  age 19 no chemo and no radiation after surgery        Medications Prior to Admission:     Prior to Admission medications    Medication Sig Start Date End Date Taking? Authorizing Provider   omeprazole (PRILOSEC) 20 MG delayed release capsule TAKE 1 CAPSULE BY MOUTH TWICE A DAY BEFORE MEALS 1/29/25  Yes Maryann Sandy MD   folic acid (FOLVITE)  General: Skin is warm and dry.      Findings: No bruising, lesion or rash.   Neurological:      General: No focal deficit present.      Mental Status: She is alert and oriented to person, place, and time. Mental status is at baseline.      Motor: No weakness.      Gait: Gait normal.   Psychiatric:         Mood and Affect: Mood normal.         Behavior: Behavior normal.         Thought Content: Thought content normal.         Judgment: Judgment normal.        Investigations:      Laboratory Testing:  No results found for this or any previous visit (from the past 24 hours).    Recent Labs     05/05/25  1245   HGB 12.9   HCT 40.7   WBC 7.4   MCV 98.8      K 4.0      CO2 26   BUN 9   CREATININE 0.9   GLUCOSE 76   AST 27   ALT 44*       No results for input(s): \"COVID19\" in the last 720 hours.  Imaging/Diagnostics:    MRI KNEE RIGHT WO CONTRAST 03/20/2025  IMPRESSION:  1. Complete tear of the anterior cruciate ligament.  2. Complete versus near complete tear of the proximal aspect of the posterior  cruciate ligament.  3. Avulsion of the conjoint fibular collateral ligament-biceps femoris tendon  from its fibular head attachment with approximately 1.7 cm retraction.  4. No meniscus tear identified.  5. Bone marrow edema in the medial and lateral femoral condyles compatible  with contusions. No fracture identified.  6. Large joint effusion.    Diagnosis:      Complete tear of anterior cruciate ligament of right knee; Right knee inju*    Plans:     1. RIGHT KNEE ARTHROSCOPY ACL, PCL, LCL, POSTEROLATERAL RECONSTRUCTION WITH ALLOGRAFT (ARTHREX, FEMORAL NERVE, SUPINE, C-ARM)      GIANNA Mon - CNP  5/16/2025  10:19 AM

## 2025-05-16 NOTE — ANESTHESIA POSTPROCEDURE EVALUATION
Department of Anesthesiology  Postprocedure Note    Patient: Livia Ferrer  MRN: [id number]  YOB: 1995  Date of evaluation: 5/16/2025    Procedure Summary       Date: 05/16/25 Room / Location: [address]    Anesthesia Start: 1232 Anesthesia Stop: 1628    Procedure: RIGHT KNEE ARTHROSCOPY ACL, PCL, LCL, POSTEROLATERAL RECONSTRUCTION WITH ALLOGRAFT (ARTHREX, FEMORAL NERVE, SUPINE, C-ARM) (Right: Knee) Diagnosis:       Complete tear of anterior cruciate ligament of right knee      Right knee injury, initial encounter      Posterior subluxation of proximal end of right tibia, initial encounter      Posterolateral complex injury, right, initial encounter      (Complete tear of anterior cruciate ligament of right knee [S83.511A])      (Right knee injury, initial encounter [S89.91XA])      (Posterior subluxation of proximal end of right tibia, initial encounter [S83.121A])      (Posterolateral complex injury, right, initial encounter [S89.91XA])    Surgeons: Thee Matthews MD Responsible Provider: rBock Cho DO    Anesthesia Type: general ASA Status: 3            Anesthesia Type: No value filed.    Felipe Phase I: Felipe Score: 10    Felipe Phase II:      Anesthesia Post Evaluation    Patient location during evaluation: PACU  Patient participation: complete - patient participated  Level of consciousness: awake and alert  Airway patency: patent  Nausea & Vomiting: no nausea and no vomiting  Cardiovascular status: hemodynamically stable  Respiratory status: acceptable  Hydration status: euvolemic  Pain management: adequate    No notable events documented.

## 2025-05-16 NOTE — DISCHARGE INSTRUCTIONS
Orthopaedic Instructions:  -Weight bearing status: Weight bearing as tolerated with the right leg with hinged knee brace locked in extension.  -Leave hinged knee brace on at all times.   -Starting three days after surgery, okay for daily dressing changes until wound/surgical incision site is dry. Dressing changes can be performed with simple Band-aids or gauze pads secured with tape/ace bandages. Once you no longer see drainage from your wounds on your dressings, it is okay to shower. Do not scrub vigorously, just let water run over wound/surgical sites. Additionally, one no longer needs to change dressings daily. It is important that you do not soak the wound/incision site underwater, though. This includes baths, hot tubs, swimming pools, etc.  -Always look for signs of compartment syndrome: pain out of proportion to the injury, pain not controlled with pain medication, numbness in digits, changing of color of digits (paleness). If these signs occur return to ED immediately for reassessment.  -Always work on toe motion (to non-injured toes) while in hinged knee brace to decrease swelling.  -Ice (20 minutes on and off 1 hour) and elevate above the level of the heart to reduce swelling and throbbing pain.  -Should urinate within 8 hours of surgery.  -Call the office or come to Emergency Room if signs of infection appear (hot, swollen, red, draining pus, fever).  -Take medications as prescribed.  -Wean off narcotics (percocet/norco) as soon as possible. Do not take tylenol if still taking narcotics.  -Follow up with Dr. Matthews in his office on  6/2/25 at 3:10 PM . Call [phone] to schedule/confirm or with any questions/concerns.              -- The nerve block is intended to help control pain anywhere from 1-3 days depending on how your body absorbs the medication   -- The nerve block does not always take away all the pain and it is done to help keep you comfortable   -- Typically, the neve block starts to wear  off around the 32-36 hour kayla   -- During the nerve block, it is normal for the appendage to feel heavy or dead weight like as well as having numbness, decreased sensation and some tingling   -- If prescribed oral pain medication, take it when you start to notice pain with the nerve block wearing off to help prevent playing catch up to control pain   -- Watch for pale, cold, white digits.  Call the doctor if that happens.

## 2025-05-16 NOTE — ANESTHESIA PRE PROCEDURE
Department of Anesthesiology  Preprocedure Note       Name:  Livia Ferrer   Age:  30 y.o.  :  1995                                          MRN:  [id number]         Date:  2025      Surgeon: Surgeon(s):  Thee Matthews MD    Procedure: Procedure(s):  RIGHT KNEE ARTHROSCOPY ACL, PCL, LCL, POSTEROLATERAL RECONSTRUCTION WITH ALLOGRAFT (ARTHREX, FEMORAL NERVE, SUPINE, C-ARM)    Medications prior to admission:   Prior to Admission medications    Medication Sig Start Date End Date Taking? Authorizing Provider   omeprazole (PRILOSEC) 20 MG delayed release capsule TAKE 1 CAPSULE BY MOUTH TWICE A DAY BEFORE MEALS 25  Yes Maryann Sandy MD   folic acid (FOLVITE) 1 MG tablet Take 1 tablet by mouth daily 24  Yes Juliocesar Brumfield MD   nicotine (NICODERM CQ) 7 MG/24HR Place 1 patch onto the skin every 24 hours  Patient taking differently: Place 1 patch onto the skin as needed 24 Yes Juliocesar Brumfield MD   levETIRAcetam (KEPPRA) 750 MG tablet Take 2 tablets by mouth 2 times daily  Patient taking differently: Take 4 tablets by mouth daily 24  Yes Mohamud Doshi MD   Prenatal Vit-Fe Fumarate-FA (PRENATAL VITAMIN PLUS LOW IRON) 27-1 MG TABS Take 1 tablet by mouth daily 24  Yes Hope Hernandez MD   albuterol sulfate HFA (VENTOLIN HFA) 108 (90 Base) MCG/ACT inhaler Inhale 2 puffs into the lungs every 6 hours as needed for Wheezing 24  Yes Hope Hernandez MD       Current medications:    Current Facility-Administered Medications   Medication Dose Route Frequency Provider Last Rate Last Admin   • lidocaine PF 1 % injection 1 mL  1 mL IntraDERmal Once PRN Myke Gandara MD       • lactated ringers infusion   IntraVENous Continuous Myke Gandara MD       • sodium chloride flush 0.9 % injection 5-40 mL  5-40 mL IntraVENous 2 times per day Myke Gandara MD       • sodium chloride flush 0.9 % injection 5-40 mL  5-40 mL IntraVENous PRN Myke Gandara MD       • 0.9 % sodium chloride infusion

## 2025-05-19 LAB — HCG, PREGNANCY URINE (POC): NEGATIVE

## 2025-06-02 ENCOUNTER — OFFICE VISIT (OUTPATIENT)
Dept: ORTHOPEDIC SURGERY | Age: 30
End: 2025-06-02

## 2025-06-02 VITALS — WEIGHT: 150 LBS | BODY MASS INDEX: 27.6 KG/M2 | HEIGHT: 62 IN | RESPIRATION RATE: 14 BRPM

## 2025-06-02 DIAGNOSIS — S83.511A RUPTURE OF ANTERIOR CRUCIATE LIGAMENT OF RIGHT KNEE, INITIAL ENCOUNTER: ICD-10-CM

## 2025-06-02 DIAGNOSIS — S83.521A RUPTURE OF POSTERIOR CRUCIATE LIGAMENT OF RIGHT KNEE, INITIAL ENCOUNTER: ICD-10-CM

## 2025-06-02 DIAGNOSIS — S83.421A TEAR OF LATERAL COLLATERAL LIGAMENT OF RIGHT KNEE, INITIAL ENCOUNTER: Primary | ICD-10-CM

## 2025-06-02 PROCEDURE — 99024 POSTOP FOLLOW-UP VISIT: CPT | Performed by: ORTHOPAEDIC SURGERY

## 2025-06-02 RX ORDER — OXYCODONE AND ACETAMINOPHEN 5; 325 MG/1; MG/1
1 TABLET ORAL EVERY 6 HOURS PRN
Qty: 28 TABLET | Refills: 0 | Status: SHIPPED | OUTPATIENT
Start: 2025-06-02 | End: 2025-06-09

## 2025-06-02 NOTE — PROGRESS NOTES
[facility] ORTHOPEDICS AND SPORTS MEDICINE  [address]  [facility] [address]     Surgery:  5/16/2025  Right Knee Arthroscopy Acl, Pcl, Lcl, Posterolateral Reconstruction With Allograft (arthrex, Femoral Nerve, Supine, C-arm) - Right    History of Present Illness:    06/02/25  This is a 30 y.o. female who presents to the clinic today for post op follow up for Right Knee Arthroscopy Acl, Pcl, Lcl, Posterolateral Reconstruction With Allograft (arthrex, Femoral Nerve, Supine, C-arm) - Right on 5/16/2025 .  Patient returns today for her first postoperative visit after a multiligament knee reconstruction.  The knee itself looks great.  All of her incision incisions have healed nicely.  There is no evidence of swelling or infection.  She is having the normal amount of postoperative pain given the magnitude of her surgery.  At this point I would start physical therapy.  She will need to continue the use of her brace.  We can unlock this from 0 to 90 degrees once her strength returns in the quadriceps.  I will refill her pain medications.  I anticipate she can return to work in 1 month      Physical Exam:  Pain is well-controlled.  she is doing well postoperatively.  The operative extremity has a well-healed incision.  It is clean, dry, and intact.    Swelling is well-controlled.    We discussed the expected postoperative course, including the relevant weightbearing restrictions/immobilization.     1. Tear of lateral collateral ligament of right knee, initial encounter    2. Rupture of posterior cruciate ligament of right knee, initial encounter    3. Rupture of anterior cruciate ligament of right knee, initial encounter

## 2025-06-05 ENCOUNTER — HOSPITAL ENCOUNTER (EMERGENCY)
Age: 30
Discharge: HOME | End: 2025-06-05
Attending: EMERGENCY MEDICINE
Payer: MEDICAID

## 2025-06-05 ENCOUNTER — HOSPITAL ENCOUNTER (OUTPATIENT)
Age: 30
Setting detail: THERAPIES SERIES
Discharge: HOME | End: 2025-06-05
Attending: ORTHOPAEDIC SURGERY
Payer: MEDICAID

## 2025-06-05 VITALS
HEART RATE: 94 BPM | RESPIRATION RATE: 18 BRPM | SYSTOLIC BLOOD PRESSURE: 124 MMHG | TEMPERATURE: 98.6 F | DIASTOLIC BLOOD PRESSURE: 76 MMHG | OXYGEN SATURATION: 99 %

## 2025-06-05 DIAGNOSIS — N76.0 BACTERIAL VAGINOSIS: Primary | ICD-10-CM

## 2025-06-05 DIAGNOSIS — R30.0 DYSURIA: ICD-10-CM

## 2025-06-05 DIAGNOSIS — B37.31 CANDIDAL VULVOVAGINITIS: ICD-10-CM

## 2025-06-05 DIAGNOSIS — B96.89 BACTERIAL VAGINOSIS: Primary | ICD-10-CM

## 2025-06-05 DIAGNOSIS — N89.8 VAGINAL DISCHARGE: ICD-10-CM

## 2025-06-05 LAB
BACTERIA URNS QL MICRO: ABNORMAL
BILIRUB UR QL STRIP: NEGATIVE
CANDIDA SPECIES: POSITIVE
CASTS #/AREA URNS LPF: ABNORMAL /LPF (ref 0–8)
CLARITY UR: ABNORMAL
COLOR UR: YELLOW
EPI CELLS #/AREA URNS HPF: ABNORMAL /HPF (ref 0–5)
GARDNERELLA VAGINALIS: POSITIVE
GLUCOSE UR STRIP-MCNC: NEGATIVE MG/DL
HCG SERPL QL: NEGATIVE
HGB UR QL STRIP.AUTO: NEGATIVE
HIV 1+2 AB+HIV1 P24 AG SERPL QL IA: NONREACTIVE
KETONES UR STRIP-MCNC: NEGATIVE MG/DL
LEUKOCYTE ESTERASE UR QL STRIP: ABNORMAL
NITRITE UR QL STRIP: NEGATIVE
PH UR STRIP: 6 [PH] (ref 5–8)
PROT UR STRIP-MCNC: ABNORMAL MG/DL
RBC #/AREA URNS HPF: ABNORMAL /HPF (ref 0–4)
SOURCE: ABNORMAL
SP GR UR STRIP: 1.03 (ref 1–1.03)
T PALLIDUM AB SER QL IA: NONREACTIVE
TRICHOMONAS: NEGATIVE
UROBILINOGEN UR STRIP-ACNC: NORMAL EU/DL (ref 0–1)
WBC #/AREA URNS HPF: ABNORMAL /HPF (ref 0–5)

## 2025-06-05 PROCEDURE — 87389 HIV-1 AG W/HIV-1&-2 AB AG IA: CPT

## 2025-06-05 PROCEDURE — 87510 GARDNER VAG DNA DIR PROBE: CPT

## 2025-06-05 PROCEDURE — 87660 TRICHOMONAS VAGIN DIR PROBE: CPT

## 2025-06-05 PROCEDURE — 97161 PT EVAL LOW COMPLEX 20 MIN: CPT

## 2025-06-05 PROCEDURE — 99283 EMERGENCY DEPT VISIT LOW MDM: CPT

## 2025-06-05 PROCEDURE — 97110 THERAPEUTIC EXERCISES: CPT

## 2025-06-05 PROCEDURE — 84703 CHORIONIC GONADOTROPIN ASSAY: CPT

## 2025-06-05 PROCEDURE — 6370000000 HC RX 637 (ALT 250 FOR IP)

## 2025-06-05 PROCEDURE — 86780 TREPONEMA PALLIDUM: CPT

## 2025-06-05 PROCEDURE — 81001 URINALYSIS AUTO W/SCOPE: CPT

## 2025-06-05 PROCEDURE — 87480 CANDIDA DNA DIR PROBE: CPT

## 2025-06-05 PROCEDURE — 87591 N.GONORRHOEAE DNA AMP PROB: CPT

## 2025-06-05 PROCEDURE — 87491 CHLMYD TRACH DNA AMP PROBE: CPT

## 2025-06-05 PROCEDURE — 87086 URINE CULTURE/COLONY COUNT: CPT

## 2025-06-05 RX ORDER — FLUCONAZOLE 150 MG/1
150 TABLET ORAL ONCE
Qty: 1 TABLET | Refills: 0 | Status: SHIPPED | OUTPATIENT
Start: 2025-06-05 | End: 2025-06-05

## 2025-06-05 RX ORDER — METRONIDAZOLE 500 MG/1
500 TABLET ORAL 2 TIMES DAILY
Qty: 14 TABLET | Refills: 0 | Status: SHIPPED | OUTPATIENT
Start: 2025-06-05 | End: 2025-06-12

## 2025-06-05 RX ORDER — METRONIDAZOLE 500 MG/1
500 TABLET ORAL ONCE
Status: COMPLETED | OUTPATIENT
Start: 2025-06-05 | End: 2025-06-05

## 2025-06-05 RX ORDER — FLUCONAZOLE 50 MG/1
150 TABLET ORAL ONCE
Status: COMPLETED | OUTPATIENT
Start: 2025-06-05 | End: 2025-06-05

## 2025-06-05 RX ADMIN — METRONIDAZOLE 500 MG: 500 TABLET ORAL at 19:43

## 2025-06-05 RX ADMIN — FLUCONAZOLE 150 MG: 50 TABLET ORAL at 19:43

## 2025-06-05 ASSESSMENT — LIFESTYLE VARIABLES: HOW OFTEN DO YOU HAVE A DRINK CONTAINING ALCOHOL: NEVER

## 2025-06-05 NOTE — ED PROVIDER NOTES
[facility]     Emergency Department     Faculty Attestation    I performed a history and physical examination of the patient and discussed management with the resident. I reviewed the resident’s note and agree with the documented findings and plan of care. Any areas of disagreement are noted on the chart. I was personally present for the key portions of any procedures. I have documented in the chart those procedures where I was not present during the key portions. I have reviewed the emergency nurses triage note. I agree with the chief complaint, past medical history, past surgical history, allergies, medications, social and family history as documented unless otherwise noted below. Documentation of the HPI, Physical Exam and Medical Decision Making performed by medical students or scribes is based on my personal performance of the HPI, PE and MDM. For Physician Assistant/ Nurse Practitioner cases/documentation I have personally evaluated this patient and have completed at least one if not all key elements of the E/M (history, physical exam, and MDM). Additional findings are as noted.    Vital signs:   Vitals:    06/05/25 1646   BP: 124/76   Pulse: 94   Resp: 18   Temp: 98.6 °F (37 °C)   SpO2: 99%      30-year-old female here with vaginal itching and discharge. No vaginal bleeding. No abdominal pain. No fever. On exam, her abdomen is soft and non-tender. Plan for a UA, pregnancy test, pelvic exam.            Karley Warner M.D,  Attending Emergency  Physician            Karley Warner MD  06/05/25 0644

## 2025-06-05 NOTE — DISCHARGE INSTRUCTIONS
You were seen in the emergency department for vaginal discharge and pain with urination.  Pelvic exam was done and swabs were collected to evaluate for vaginitis and gonorrhea/chlamydia.  Your urine was tested as well.  Your laboratory results were not back, but you wanted to be discharged.  You were advised to stay and wait for your results to return, however you requested to be discharged.  Follow-up with your primary care doctor to be treated if your labs are positive.  Follow your lab results on [facility] as well.      Call today or tomorrow to follow up with Maryann Sandy MD  in 3 days.     Return to the Emergency Department for fever > 101.5, inability to urinate, burning when you urinate, increase in the number of times or if you have an urgency to urinate, any other care or concern.

## 2025-06-05 NOTE — ED PROVIDER NOTES
[facility] EMERGENCY DEPARTMENT  Emergency Department Encounter  Emergency Medicine Resident     Pt Name:Livia Ferrer  MRN: [id number]  Birthdate 1995  Date of evaluation: 6/5/25  PCP:  Maryann Sandy MD  Note Started: 5:17 PM EDT      CHIEF COMPLAINT       Chief Complaint   Patient presents with    Vaginal Discharge    Dysuria     X2 days now       HISTORY OF PRESENT ILLNESS  (Location/Symptom, Timing/Onset, Context/Setting, Quality, Duration, Modifying Factors, Severity.)      Livia Ferrer is a 30 y.o. female who presents with complaint of dysuria, vaginal discharge, vaginal itching which have been intermittent for the last 2 weeks, however have been worsening over the last 2 days.  Patient reports having some white vaginal discharge and reports some swelling of her vulva.  She denies any rashes at this time.  Patient denies any abdominal pain, pelvic pain, nausea, vomiting, fever, chills, or other concerning symptoms.  Patient is currently sexually active with 1 male partner.  Patient states she would like to be tested for all STIs.  Patient does not believe that she is pregnant at this time.  All other review of systems are negative.    PAST MEDICAL / SURGICAL / SOCIAL / FAMILY HISTORY      has a past medical history of Anxiety, Asthma, Depression, GERD (gastroesophageal reflux disease), and Seizures (HCC).     has a past surgical history that includes brain surgery and knee surgery (Right, 5/16/2025).    Social History     Socioeconomic History    Marital status: Single     Spouse name: Not on file    Number of children: Not on file    Years of education: Not on file    Highest education level: Not on file   Occupational History    Not on file   Tobacco Use    Smoking status: Some Days     Current packs/day: 0.25     Average packs/day: 0.3 packs/day for 4.0 years (1.0 ttl pk-yrs)     Types: Cigarettes    Smokeless tobacco: Never    Tobacco comments:     2-3 per day   Vaping Use    Vaping    Bacteria, UA FEW(!) [EF]   2334 Vaginitis DNA Probe(!):    SOURCE, [id number] .VAGINAL SWAB   Trichomonas NEGATIVE   GARDNERELLA VAGINALIS POSITIVE(!)   Candida Species POSITIVE(!) [EF]   2334 T. pallidum Ab:    T. pallidum, IgG NONREACTIVE [EF]   2334 HIV Screen:    HIV Ag/Ab NONREACTIVE [EF]   2334 HCG Qualitative, Serum:    Preg, Serum NEGATIVE [EF]   2334 Vaginitis probe was positive for BV and Candida.  This is consistent with the discharge noted on pelvic exam.  Patient was treated with a dose of fluconazole and Flagyl for BV and her yeast infection. [EF]   2337 Was discharged home with prescription for Flagyl for her BV infection and a second dose of fluconazole for her yeast infection to be taken in 2 days.    Return precautions and close PCP follow-up was recommended.   [EF]      ED Course User Index  [EF] Gilberto Villalpando DO       FINAL IMPRESSION      1. Bacterial vaginosis    2. Dysuria    3. Vaginal discharge    4. Candidal vulvovaginitis          DISPOSITION / PLAN     DISPOSITION Decision To Discharge 06/05/2025 07:28:46 PM   DISPOSITION CONDITION Stable       PATIENT REFERRED TO:  Maryann Sandy MD  [address] [facility] [id number] [phone]          [facility] Emergency Department  [address] [address]  [phone]  Go to   As needed      DISCHARGE MEDICATIONS:  Discharge Medication List as of 6/5/2025  7:41 PM        START taking these medications    Details   metroNIDAZOLE (FLAGYL) 500 MG tablet Take 1 tablet by mouth 2 times daily for 7 days, Disp-14 tablet, R-0Normal      fluconazole (DIFLUCAN) 150 MG tablet Take 1 tablet by mouth once for 1 dose, Disp-1 tablet, R-0Normal             Gilberto Villalpando DO  Emergency Medicine Resident    (Please note that portions of this note were completed with a voice recognition program.  Efforts were made to edit the dictations but occasionally words are mis-transcribed.)

## 2025-06-05 NOTE — ED PROVIDER NOTES
[facility]  FACULTY HANDOFF       Handoff taken on the following patient from prior Attending Physician:Dr. Warner  Pt Name: Livia Ferrer  PCP:  Maryann Sandy MD  7:06 PM EDT    Attestation  I was available and discussed any additional care issues that arose and coordinated the management plans with the resident(s) caring for the patient during my duty period. Any areas of disagreement with resident's documentation of care or procedures are noted on the chart. I was personally present for the key portions of any/all procedures during my duty period. I have documented in the chart those procedures where I was not present during the key portions.         CHIEF COMPLAINT       Chief Complaint   Patient presents with    Vaginal Discharge    Dysuria     X2 days now         CURRENT MEDICATIONS     Previous Medications  Previous Medications    ALBUTEROL SULFATE HFA (VENTOLIN HFA) 108 (90 BASE) MCG/ACT INHALER    Inhale 2 puffs into the lungs every 6 hours as needed for Wheezing    ASPIRIN (ROSENDO ASPIRIN) 325 MG TABLET    Take 1 tablet by mouth daily for 7 days    FOLIC ACID (FOLVITE) 1 MG TABLET    Take 1 tablet by mouth daily    IBUPROFEN (ADVIL;MOTRIN) 800 MG TABLET    Take 1 tablet by mouth 3 times daily (with meals)    LEVETIRACETAM (KEPPRA) 750 MG TABLET    Take 2 tablets by mouth 2 times daily    NICOTINE (NICODERM CQ) 7 MG/24HR    Place 1 patch onto the skin every 24 hours    OMEPRAZOLE (PRILOSEC) 20 MG DELAYED RELEASE CAPSULE    TAKE 1 CAPSULE BY MOUTH TWICE A DAY BEFORE MEALS    OXYCODONE-ACETAMINOPHEN (PERCOCET) 5-325 MG PER TABLET    Take 1 tablet by mouth every 6 hours as needed for Pain for up to 7 days. Intended supply: 7 days. Take lowest dose possible to manage pain Max Daily Amount: 4 tablets    PRENATAL VIT-FE FUMARATE-FA (PRENATAL VITAMIN PLUS LOW IRON) 27-1 MG TABS    Take 1 tablet by mouth daily       Encounter Medications  No orders of the defined types were placed in this  encounter.      ALLERGIES     has no known allergies.      RECENT VITALS:   Temp: 98.6 °F (37 °C),  Pulse: 94, Respirations: 18, BP: 124/76    RADIOLOGY:   No orders to display       LABS:  Labs Reviewed   URINALYSIS WITH REFLEX TO CULTURE - Abnormal; Notable for the following components:       Result Value    Turbidity UA Cloudy (*)     Specific Gravity, UA 1.031 (*)     Protein, UA TRACE (*)     Leukocyte Esterase, Urine MODERATE (*)     All other components within normal limits   MICROSCOPIC URINALYSIS - Abnormal; Notable for the following components:    Bacteria, UA FEW (*)     All other components within normal limits   C.TRACHOMATIS N.GONORRHOEAE DNA   VAGINITIS DNA PROBE   CULTURE, URINE   HIV SCREEN   T. PALLIDUM AB   HCG, SERUM, QUALITATIVE           PLAN/ TASKS OUTSTANDING       Vaginal discharge with Burning. No abdominal pain. Pt also feels swollen in her genital region. Pt wants STI testing including HIV and syphilis. Pt had recent Knee surgery. WIll treat appropriately and discharge.     Pt wants to leave. Before treatment is completed. WIll have patient ama.     (Please note that portions of this note were completed with a voice recognition program.  Efforts were made to edit the dictations but occasionally words are mis-transcribed.)    Darius Magallanes MD, MD,   Attending Emergency Physician       Darius Magallanes MD  06/05/25 1907       Darius Magallanes MD  06/05/25 1920

## 2025-06-05 NOTE — ED NOTES
Labeled blood specimens sent to lab via tube system.    [x] Green/yellow  [x] Lavender   [] On ice   [] Blue   [] Green/black [] On ice  [x] Yellow  [] On ice  [] Red  [] Pink  [] Blood Cultures  [] x1 [] x2    [] Ped Green  [] On ice  [] Ped Lavender  [] On ice    [] Ped Yellow  [] On ice  [] Ped Red

## 2025-06-05 NOTE — ED NOTES
Pt arrives alert and oriented x4 and ambulatory from triage  Pt complains of vaginal discharge with itching and burning for 3 days   Pt reports she was placed on abx for her recent knee surgery and she believes she has a yeast infection   RR even and unlabored.   NAD noted.   Whiteboard updated.  Will continue with plan of care.

## 2025-06-06 LAB
C TRACH DNA SPEC QL PROBE+SIG AMP: NEGATIVE
MICROORGANISM SPEC CULT: NORMAL
N GONORRHOEA DNA SPEC QL PROBE+SIG AMP: NEGATIVE
SPECIMEN DESCRIPTION: NORMAL
SPECIMEN DESCRIPTION: NORMAL

## 2025-06-09 ENCOUNTER — HOSPITAL ENCOUNTER (OUTPATIENT)
Age: 30
Setting detail: THERAPIES SERIES
Discharge: HOME | End: 2025-06-09
Attending: ORTHOPAEDIC SURGERY
Payer: MEDICAID

## 2025-06-09 PROCEDURE — 97110 THERAPEUTIC EXERCISES: CPT | Performed by: PHYSICAL THERAPIST

## 2025-06-09 NOTE — FLOWSHEET NOTE
[x] [facility]  Outpatient Rehabilitation &  Therapy  [address]  [phone]  [phone] [] [facility]  Outpatient Rehabilitation &  Therapy  [address]  P: [phone]  F: [phone] [] [facility]  Outpatient Rehabilitation &  Therapy  [address]  P: [phone]  F: [phone] [] [facility]  Outpatient Rehabilitation &  Therapy  [address]  [phone]  [phone] [] [facility]  Outpatient Rehabilitation &  Therapy  [address]   P: [phone]  F: [phone]  [] [facility]  Outpatient Rehabilitation &  Therapy  [address]  P: [phone]  F: [phone] [] [facility]  Outpatient Rehabilitation &  Therapy  [address]  P: [phone]  F: [phone] [] [facility]  Outpatient Rehabilitation &  Therapy  [address]  P: [phone]  F: [phone] [] [facility]  Outpatient Rehabilitation &  Therapy  [address]  P: [phone]  F: [phone]  [] [facility] Outpatient Rehabilitation &  Therapy  [address]  P: [phone]  F: [phone]     Physical Therapy Daily Treatment Note    Date:  2025  Patient Name:  Livia Ferrer    :  1995  MRN: [id number]  Physician: Thee Matthews MD                                      Insurance: [facility] MEDICAID AUTH REQUIRED AFTER 30TH VISIT   Medical Diagnosis:   S83.421A (ICD-10-CM) - Tear of lateral collateral ligament of right knee, initial encounter   S83.521A (ICD-10-CM) - Rupture of posterior cruciate ligament of right knee, initial encounter   S83.511A (ICD-10-CM) - Rupture of anterior cruciate ligament of right knee, initial encounter                                Rehab Codes: M 25.571, M 25.561, M 25.661, M 79.661, R 26.89,

## 2025-06-11 ENCOUNTER — HOSPITAL ENCOUNTER (OUTPATIENT)
Age: 30
Setting detail: THERAPIES SERIES
Discharge: HOME | End: 2025-06-11
Attending: ORTHOPAEDIC SURGERY
Payer: MEDICAID

## 2025-06-11 ENCOUNTER — TELEPHONE (OUTPATIENT)
Dept: ORTHOPEDIC SURGERY | Age: 30
End: 2025-06-11

## 2025-06-11 PROCEDURE — 97016 VASOPNEUMATIC DEVICE THERAPY: CPT

## 2025-06-11 PROCEDURE — 97110 THERAPEUTIC EXERCISES: CPT

## 2025-06-11 NOTE — FLOWSHEET NOTE
without cues.  Demonstrate Knowledge of fall prevention  Pt to walk independently w/o AD   LTG: (to be met in 24 treatments)  ? Pain: pt to report no greater than 5/10 pain at max  ? ROM: pt to increase AROM knee flexion to 0- 120 degrees for improved functional mobility  ? Strength: pt to perform 5x STS in no greater than 10 seconds for improved tolerance to functional activity  ? Function: pt to improve score on KOOS jr to no greater than 30% functionally impaired  Pt to walk up and down stairs independently w/o AD  Pt to perform SLS for 30 seconds on R leg for improved LE stability and reduced risk of falls      Pt. Education:  [x] Yes  [] No  [x] Reviewed Prior HEP/Ed  Method of Education: [x] Verbal  [x] Demo  [] Written  Comprehension of Education:  [x] Verbalizes understanding.  [x] Demonstrates understanding.  [x] Needs review.  [] Demonstrates/verbalizes HEP/Ed previously given.     Plan: [x] Continue current frequency toward long and short term goals.    [x] Specific Instructions for subsequent treatments: NMES for quad/DF, progress knee ext/quad      Time In: 2:00 pm            Time Out: 3:00 pm    Electronically signed by:  KATIE LUX PTA

## 2025-06-11 NOTE — TELEPHONE ENCOUNTER
Patient also wanting to know if she can have any more pain medication or what she needs to be taking for pain.  The pain is worse after her PT which she goes 3 days a week.

## 2025-06-11 NOTE — TELEPHONE ENCOUNTER
Patient calling in reference to her order for a donjoy knee brace.  She has been getting the run around and does not know where to go get it.  PT told her they don't have one and ortho said they can't give her one.  Please contact her after 3pm at [phone]

## 2025-06-12 ENCOUNTER — HOSPITAL ENCOUNTER (OUTPATIENT)
Age: 30
Setting detail: THERAPIES SERIES
Discharge: HOME | End: 2025-06-12
Attending: ORTHOPAEDIC SURGERY
Payer: MEDICAID

## 2025-06-12 DIAGNOSIS — S83.511A RUPTURE OF ANTERIOR CRUCIATE LIGAMENT OF RIGHT KNEE, INITIAL ENCOUNTER: ICD-10-CM

## 2025-06-12 DIAGNOSIS — S83.521A RUPTURE OF POSTERIOR CRUCIATE LIGAMENT OF RIGHT KNEE, INITIAL ENCOUNTER: ICD-10-CM

## 2025-06-12 DIAGNOSIS — S83.421A TEAR OF LATERAL COLLATERAL LIGAMENT OF RIGHT KNEE, INITIAL ENCOUNTER: Primary | ICD-10-CM

## 2025-06-12 PROCEDURE — 97112 NEUROMUSCULAR REEDUCATION: CPT | Performed by: PHYSICAL THERAPIST

## 2025-06-12 PROCEDURE — 97110 THERAPEUTIC EXERCISES: CPT | Performed by: PHYSICAL THERAPIST

## 2025-06-12 PROCEDURE — 97016 VASOPNEUMATIC DEVICE THERAPY: CPT | Performed by: PHYSICAL THERAPIST

## 2025-06-12 NOTE — TELEPHONE ENCOUNTER
Patient called in to the [place] location. She stated that she had called yesterday but no one returned her call. She started talking about multiple things:     Paperwork: She asked where she could e-mail her paperwork to so it could get completed. She states that the plan is to return beginning of July in hopes Dr. Matthews clears her to do so. I gave her my e-mail and she stated she would send it so I could complete it for her (she had no way of faxing).     Appointment: She wasn't told when she needed to come back, so I advised that she comes in right before she plans to return to work so Dr. Matthews can give her the OK. If at that time she has any issues or concerns and doesn't feel ready, we can adjust paperwork for her. She is scheduled for 7/3/25 @ 1:00 pm in .     Brace: Patient stated she has not gotten any information on where she can go to her brace that Dr. Matthews recommended for her to have. I told her that my recommendation would be [facility] or [facility] off of [address]. I told her we use them a lot and they help assist making sure things fit correctly. She stated she had the information of these two places and will call them.     Medication: She wanted to know if Dr. Matthews would refill her pain medication or let her know what else she can take. I asked if she had any more ibuprofen from surgery, and she stated no. I told her that Dr. Matthews only fills narcotics at appointments. She wanted to know if he would refill the ibuprofen for her. I told her I could send in a request. She stated she uses [facility] as pharmacy. Refill request was sent to Dr. Matthews.     Patient had no further questions and I told her I would let her know when everything was completed for her. She stated understanding.

## 2025-06-12 NOTE — TELEPHONE ENCOUNTER
I called and spoke with patient informing her I still had not received her paperwork in my e-mail. She stated she meant to call me back. She said she is just going to wait until her follow up to bring everything in to be filled out. They just sent her a whole bunch of paperwork, and she said she needs to know all of her restrictions and wants Dr. Matthews to write them out for her and discuss everything.   I stated that we could do that for her now and we could speak with him instead of her waiting until her follow up, she is choosing to wait.   I told her if she changes her mind to just reach out and let us know how we can help.     She understood and had no further questions.

## 2025-06-12 NOTE — FLOWSHEET NOTE
[x] [facility]  Outpatient Rehabilitation &  Therapy  [address]  [phone]  [phone]     Physical Therapy Daily Treatment Note    Date:  2025  Patient Name:  Livia Ferrer    :  1995  MRN: [id number]  Physician: Thee Matthews MD                                      Insurance: [facility] MEDICAID AUTH REQUIRED AFTER 30TH VISIT   Medical Diagnosis:   S83.421A (ICD-10-CM) - Tear of lateral collateral ligament of right knee, initial encounter   S83.521A (ICD-10-CM) - Rupture of posterior cruciate ligament of right knee, initial encounter   S83.511A (ICD-10-CM) - Rupture of anterior cruciate ligament of right knee, initial encounter                                Rehab Codes: M 25.571, M 25.561, M 25.661, M 79.661, R 26.89, R 20.1, R 20.2  Onset date: 25 (surgery date)              Next Dr's appt.: none     Visit# / total visits:     Cancels/No Shows: 0    Subjective:    Pain:  [x] Yes  [] No Location: R knee Pain Rating: (0-10 scale) 6-7/10  Pain altered Tx:  [] No  [x] Yes  Action: Slow progression through therex  Comments: Pt notes she felt sore after last session but okay, states no other falls since most recent one with dog.     Objective:  R knee ROM 25    Knee extension 7A/6P    Knee flexion           Modalities: Vasocompression to right knee supine wedge under legs, 36 degrees, low pressure for 10 minutes after treatment for pain control, swelling  Precautions [] No  [x] Yes:   Exercises: Exercises completed marked with \"x\":  Exercise Reps/ Time Weight/ Level Comments    Nustep 5 mins L1 Collecting subjective  Limited ROM with brace donned x          Brace donned       Hip 3 way   Flex, hip abd (L sidelying), hip ext (prone)                  Brace doffed       Seated        Knee ext stretch 2x10 5\" Knee extended, seated on edge x   Heel slide under table 2x10  Slow, cues to keep knee neutral vs IR x          Long sit       Knee ext ovp stretch 1x10 5\"

## 2025-06-12 NOTE — TELEPHONE ENCOUNTER
DOS: 5/16/25 RIGHT KNEE ARTHROSCOPY ACL, PCL, LCL, POSTEROLATERAL RECONSTRUCTION WITH ALLOGRAFT   Last Fill: 5/16/25  Last OV: 6/2/25  Next OV: 7/3/25    Patient requesting a refill on ibuprofen    Patient verified pharmacy     NKDA

## 2025-06-13 RX ORDER — IBUPROFEN 800 MG/1
800 TABLET, FILM COATED ORAL
Qty: 90 TABLET | Refills: 0 | Status: SHIPPED | OUTPATIENT
Start: 2025-06-13

## 2025-06-18 ENCOUNTER — HOSPITAL ENCOUNTER (OUTPATIENT)
Age: 30
Setting detail: THERAPIES SERIES
Discharge: HOME | End: 2025-06-18
Attending: ORTHOPAEDIC SURGERY
Payer: MEDICAID

## 2025-06-18 PROCEDURE — 97016 VASOPNEUMATIC DEVICE THERAPY: CPT

## 2025-06-18 PROCEDURE — 97110 THERAPEUTIC EXERCISES: CPT

## 2025-06-18 PROCEDURE — 97112 NEUROMUSCULAR REEDUCATION: CPT

## 2025-06-18 NOTE — FLOWSHEET NOTE
[x] [place]  Outpatient Rehabilitation &  Therapy  [address]  [phone]  [phone]     Physical Therapy Daily Treatment Note    Date:  2025  Patient Name:  Livia Ferrer    :  1995  MRN: [id number]  Physician: Thee Matthews MD                                      Insurance: [facility] MEDICAID AUTH REQUIRED AFTER 30TH VISIT   Medical Diagnosis:   S83.421A (ICD-10-CM) - Tear of lateral collateral ligament of right knee, initial encounter   S83.521A (ICD-10-CM) - Rupture of posterior cruciate ligament of right knee, initial encounter   S83.511A (ICD-10-CM) - Rupture of anterior cruciate ligament of right knee, initial encounter                                Rehab Codes: M 25.571, M 25.561, M 25.661, M 79.661, R 26.89, R 20.1, R 20.2  Onset date: 25 (surgery date)              Next Dr's appt.: 25     Visit# / total visits:     Cancels/No Shows: 0/1    Subjective:    Pain:  [x] Yes  [] No Location: R knee Pain Rating: (0-10 scale) 4/10  Pain altered Tx:  [] No  [x] Yes  Action: Slow progression through therex  Comments: Patient notes pain is 4/10 today. Feels like she is able to move her toes a little more.      Objective:  R knee ROM 25   Knee extension 7A/6P 9 A  7 A   Knee flexion  78 A         Modalities: Vasocompression to right knee supine wedge under legs, 36 degrees, low pressure for 10 minutes after treatment for pain control, swelling  Precautions [] No  [x] Yes:   Exercises: Exercises completed marked with \"x\":  Exercise Reps/ Time Weight/ Level Comments    Nustep 5 mins L1 Limited ROM with brace donned x          Brace donned       Hip 3 way   Flex, hip abd (L sidelying), hip ext (prone)                  Brace doffed       Seated        Knee ext stretch 2x10 5\" Knee extended, seated on edge x   Heel slide under table 2x10  Slow, cues to keep knee neutral vs IR x          Long sit       Knee ext ovp stretch 1x10 5\" By therapist,  Patient left via transport to [facility] in stable condition. Patients vitals were assessed before discharge and were stable. Patient verbalized understanding of discharge instructions, follow up and medications. RN explained information in discharge packet and patient verbalized they have no questions at this time. Patient left ER with all paperwork and belongings that RN is aware of.

## 2025-06-20 ENCOUNTER — HOSPITAL ENCOUNTER (OUTPATIENT)
Age: 30
Setting detail: THERAPIES SERIES
Discharge: HOME | End: 2025-06-20
Attending: ORTHOPAEDIC SURGERY
Payer: MEDICAID

## 2025-06-20 PROCEDURE — 97016 VASOPNEUMATIC DEVICE THERAPY: CPT

## 2025-06-20 PROCEDURE — 97110 THERAPEUTIC EXERCISES: CPT

## 2025-06-20 PROCEDURE — 97112 NEUROMUSCULAR REEDUCATION: CPT

## 2025-06-20 NOTE — FLOWSHEET NOTE
[x] [facility]  Outpatient Rehabilitation &  Therapy  [address]  [phone]  [phone]     Physical Therapy Daily Treatment Note    Date:  2025  Patient Name:  Livia Ferrer    :  1995  MRN: [id number]  Physician: Thee Matthews MD                                      Insurance: [facility] MEDICAID AUTH REQUIRED AFTER 30TH VISIT   Medical Diagnosis:   S83.421A (ICD-10-CM) - Tear of lateral collateral ligament of right knee, initial encounter   S83.521A (ICD-10-CM) - Rupture of posterior cruciate ligament of right knee, initial encounter   S83.511A (ICD-10-CM) - Rupture of anterior cruciate ligament of right knee, initial encounter                                Rehab Codes: M 25.571, M 25.561, M 25.661, M 79.661, R 26.89, R 20.1, R 20.2  Onset date: 25 (surgery date)              Next Dr's appt.: 25     Visit# / total visits:     Cancels/No Shows: 0/1    Subjective:    Pain:  [x] Yes  [] No Location: R knee Pain Rating: (0-10 scale) 5/10  Pain altered Tx:  [] No  [x] Yes  Action: Slow progression through therex  Comments: Patient states she was cooking yesterday intermittently sitting/standing for 3 hours. Woke up this morning with pain 8/10. Ice and elevated knee and pain is better upon arrival to PT, rating 5/10.     Objective:  R knee ROM 25   Knee extension 7A/6P 9 A  7 A   Knee flexion  78 A         Modalities: Vasocompression to right knee supine wedge under legs, 36 degrees, low pressure for 10 minutes after treatment for pain control, swelling  Precautions [] No  [x] Yes:   Exercises: Exercises completed marked with \"x\":  Exercise Reps/ Time Weight/ Level Comments    Nustep 5 mins L1 Limited ROM with brace donned x          Brace donned       Hip 3 way   Flex, hip abd (L sidelying), hip ext (prone)                  Brace doffed       Seated        Knee ext stretch 2x10 5\" Knee extended, seated on edge x   Heel slide under table 2x10   Anesthesia Transfer of Care Note    Patient: Jaymie Crespo    Procedure(s) Performed: Procedure(s) (LRB):  ENDARTERECTOMY-CAROTID (Left)    Patient location: PACU    Anesthesia Type: general    Transport from OR: Transported from OR on room air with adequate spontaneous ventilation    Post pain: adequate analgesia    Post assessment: no apparent anesthetic complications    Post vital signs: stable    Level of consciousness: awake    Nausea/Vomiting: no nausea/vomiting    Complications: none    Transfer of care protocol was followed      Last vitals: Visit Vitals  /69   Pulse 70   Temp 36.4 °C (97.6 °F) (Oral)   Resp 16   Ht 5' (1.524 m)   Wt 67.4 kg (148 lb 9.4 oz)   LMP  (LMP Unknown)   SpO2 97%   Breastfeeding No   BMI 29.02 kg/m²

## 2025-06-24 ENCOUNTER — HOSPITAL ENCOUNTER (OUTPATIENT)
Age: 30
Setting detail: THERAPIES SERIES
Discharge: HOME | End: 2025-06-24
Attending: ORTHOPAEDIC SURGERY
Payer: MEDICAID

## 2025-06-24 PROCEDURE — 97110 THERAPEUTIC EXERCISES: CPT

## 2025-06-24 PROCEDURE — 97112 NEUROMUSCULAR REEDUCATION: CPT

## 2025-06-24 NOTE — FLOWSHEET NOTE
[x] [facility]  Outpatient Rehabilitation &  Therapy  [address]  [phone]  [phone]     Physical Therapy Daily Treatment Note    Date:  2025  Patient Name:  Livia Ferrer    :  1995  MRN: [id number]  Physician: Thee Matthews MD                                      Insurance: [facility] MEDICAID AUTH REQUIRED AFTER 30TH VISIT   Medical Diagnosis:   S83.421A (ICD-10-CM) - Tear of lateral collateral ligament of right knee, initial encounter   S83.521A (ICD-10-CM) - Rupture of posterior cruciate ligament of right knee, initial encounter   S83.511A (ICD-10-CM) - Rupture of anterior cruciate ligament of right knee, initial encounter                                Rehab Codes: M 25.571, M 25.561, M 25.661, M 79.661, R 26.89, R 20.1, R 20.2  Onset date: 25 (surgery date)              Next Dr's appt.: 25     Visit# / total visits:     Cancels/No Shows: 0/1    Subjective:    Pain:  [x] Yes  [] No Location: R knee Pain Rating: (0-10 scale) 2/10  Pain altered Tx:  [] No  [x] Yes  Action: Slow progression through therex  Comments: States she did ice knee this morning. Woke up with stiffness in knee. Pain today is 2/10.    Objective:  R knee ROM 25   Knee extension 7A/6P 9 A  7 A 7A   Knee flexion  78 A 81 A         Modalities: Vasocompression to right knee supine wedge under legs, 36 degrees, low pressure for 5 minutes after treatment for pain control, swelling  Precautions [] No  [x] Yes:   Exercises: Exercises completed marked with \"x\":  Exercise Reps/ Time Weight/ Level Comments    Nustep 5 mins L1 Limited ROM with brace donned x          Brace donned       Hip 3 way 10x2  SLR x                 Brace doffed       Seated        Knee ext stretch 2x10 5\" Knee extended, seated on edge x   Heel slide under table 2x10  Slow, cues to keep knee neutral vs IR x          Long sit       Knee ext ovp stretch 1x10 5\" By therapist, towel prop under ankle

## 2025-06-25 ENCOUNTER — HOSPITAL ENCOUNTER (OUTPATIENT)
Age: 30
Setting detail: THERAPIES SERIES
Discharge: HOME | End: 2025-06-25
Attending: ORTHOPAEDIC SURGERY
Payer: MEDICAID

## 2025-06-25 PROCEDURE — 97016 VASOPNEUMATIC DEVICE THERAPY: CPT

## 2025-06-25 PROCEDURE — 97110 THERAPEUTIC EXERCISES: CPT

## 2025-06-25 NOTE — FLOWSHEET NOTE
[x] [facility]  Outpatient Rehabilitation &  Therapy  [address]  [phone]  [phone]     Physical Therapy Daily Treatment Note    Date:  2025  Patient Name:  Livia Ferrer    :  1995  MRN: [id number]  Physician: Thee Matthews MD                                      Insurance: [facility] MEDICAID AUTH REQUIRED AFTER 30TH VISIT   Medical Diagnosis:   S83.421A (ICD-10-CM) - Tear of lateral collateral ligament of right knee, initial encounter   S83.521A (ICD-10-CM) - Rupture of posterior cruciate ligament of right knee, initial encounter   S83.511A (ICD-10-CM) - Rupture of anterior cruciate ligament of right knee, initial encounter                                Rehab Codes: M 25.571, M 25.561, M 25.661, M 79.661, R 26.89, R 20.1, R 20.2  Onset date: 25 (surgery date)              Next Dr's appt.: 25     Visit# / total visits:     Cancels/No Shows: 0/1    Subjective:    Pain:  [x] Yes  [] No Location: R knee Pain Rating: (0-10 scale) 7/10  Pain altered Tx:  [] No  [x] Yes  Action: Slow progression through therex  Comments: States she was making food, exercises and stretching last night. Did not sleep much as she was watching a TV show.     Objective:  R knee ROM 25   Knee extension 7A/6P 9 A  7 A 7A   Knee flexion  78 A 81 A         Modalities: Vasocompression to right knee supine wedge under legs, 36 degrees, low pressure for 15 minutes after treatment for pain control, swelling  Precautions [] No  [x] Yes:   Exercises: Exercises completed marked with \"x\":  Exercise Reps/ Time Weight/ Level Comments    Nustep 5 mins L1 Limited ROM with brace donned x          Brace donned       Hip 3 way 10x2  SLR x                 Brace doffed       Seated        Knee ext stretch 2x10 5\" Knee extended, seated on edge x   Heel slide under table 2x10  Slow, cues to keep knee neutral vs IR x          Long sit       Knee ext ovp stretch 1x10 5\" By therapist,

## 2025-06-27 ENCOUNTER — HOSPITAL ENCOUNTER (OUTPATIENT)
Age: 30
Setting detail: THERAPIES SERIES
Discharge: HOME | End: 2025-06-27
Attending: ORTHOPAEDIC SURGERY
Payer: MEDICAID

## 2025-06-27 NOTE — FLOWSHEET NOTE
[x] [facility]  Outpatient Rehabilitation &  Therapy  [address]  [phone]  [phone]     Therapy Cancel/No Show note    Date: 2025  Patient: Livia Ferrer  : 1995  MRN: [id number]    Cancels/No Shows to date:     For today's appointment patient:    [x]  Cancelled    [] Rescheduled appointment    [] No-show     Reason given by patient:    []  Patient ill    []  Conflicting appointment    [] No transportation      [] Conflict with work    [] No reason given    [] Weather related    [] COVID-19    [x] Other: Has a lot going on at home   Comments:        [x] Next appointment was confirmed    Electronically signed by: KATIE LUX PTA

## 2025-06-30 ENCOUNTER — TELEPHONE (OUTPATIENT)
Dept: ORTHOPEDIC SURGERY | Age: 30
End: 2025-06-30

## 2025-06-30 ENCOUNTER — HOSPITAL ENCOUNTER (OUTPATIENT)
Age: 30
Setting detail: THERAPIES SERIES
Discharge: HOME | End: 2025-06-30
Attending: ORTHOPAEDIC SURGERY
Payer: MEDICAID

## 2025-06-30 PROCEDURE — 97016 VASOPNEUMATIC DEVICE THERAPY: CPT

## 2025-06-30 PROCEDURE — 97110 THERAPEUTIC EXERCISES: CPT

## 2025-06-30 NOTE — TELEPHONE ENCOUNTER
Leilani Benitez is needing you to addend your note for this patient's ACL functional brace you ordered.     They state that your note needs to say \" Patient is transitioning from current post-op brace to a a functional ACL brace for support and/or protection from injury during ADL's\"     Once you addend your note, [facility] staff, can you print and fax to [facility] (fax is scanned in to media). If you can't, please let me know so I can fax it in for the patient. Thank you.

## 2025-06-30 NOTE — FLOWSHEET NOTE
[x] [facility]  Outpatient Rehabilitation &  Therapy  [address]  [phone]  [phone]     Physical Therapy Daily Treatment Note    Date:  2025  Patient Name:  Livia Ferrer    :  1995  MRN: [id number]  Physician: Thee Matthews MD                                      Insurance: [facility] MEDICAID AUTH REQUIRED AFTER 30TH VISIT   Medical Diagnosis:   S83.421A (ICD-10-CM) - Tear of lateral collateral ligament of right knee, initial encounter   S83.521A (ICD-10-CM) - Rupture of posterior cruciate ligament of right knee, initial encounter   S83.511A (ICD-10-CM) - Rupture of anterior cruciate ligament of right knee, initial encounter                                Rehab Codes: M 25.571, M 25.561, M 25.661, M 79.661, R 26.89, R 20.1, R 20.2  Onset date: 25 (surgery date)              Next Dr's appt.: 25     Visit# / total visits:     Cancels/No Shows:     Subjective:    Pain:  [x] Yes  [] No Location: R knee Pain Rating: (0-10 scale) 5/10  Pain altered Tx:  [] No  [x] Yes  Action: Slow progression through therex  Comments: Patient arrives 11 minutes late this date. Patient states pain is 5/10 today. Over the weekend swimming and it felt good. Notes she has some swelling from over the weekend.    Objective:  R knee ROM 25   Knee extension 7A/6P 9 A  7 A 7A 7A   Knee flexion  78 A 81 A 80A         Modalities: Vasocompression to right knee supine wedge under legs, 36 degrees, low pressure for 10 minutes after treatment for pain control, swelling  Precautions [] No  [x] Yes:   Exercises: Exercises completed marked with \"x\":  Exercise Reps/ Time Weight/ Level Comments    Nustep 5 mins L1 Limited ROM with brace donned x          Brace doffed       Seated        Knee ext stretch 2x10 5\" Knee extended, seated on edge x   Heel slide under table 2x10  Slow, cues to keep knee neutral vs IR x          Long sit       Knee ext ovp stretch 1x10

## 2025-07-01 ENCOUNTER — OFFICE VISIT (OUTPATIENT)
Dept: ORTHOPEDIC SURGERY | Age: 30
End: 2025-07-01
Payer: MEDICAID

## 2025-07-01 ENCOUNTER — HOSPITAL ENCOUNTER (OUTPATIENT)
Age: 30
Setting detail: THERAPIES SERIES
Discharge: HOME | End: 2025-07-01
Attending: ORTHOPAEDIC SURGERY
Payer: MEDICAID

## 2025-07-01 VITALS — HEIGHT: 62 IN | RESPIRATION RATE: 14 BRPM | BODY MASS INDEX: 27.6 KG/M2 | WEIGHT: 150 LBS

## 2025-07-01 DIAGNOSIS — S83.511A RUPTURE OF ANTERIOR CRUCIATE LIGAMENT OF RIGHT KNEE, INITIAL ENCOUNTER: Primary | ICD-10-CM

## 2025-07-01 DIAGNOSIS — S83.421A TEAR OF LCL (LATERAL COLLATERAL LIGAMENT) OF KNEE, RIGHT, INITIAL ENCOUNTER: ICD-10-CM

## 2025-07-01 DIAGNOSIS — S89.91XA PCL INJURY, RIGHT, INITIAL ENCOUNTER: ICD-10-CM

## 2025-07-01 DIAGNOSIS — S83.521A RUPTURE OF POSTERIOR CRUCIATE LIGAMENT OF RIGHT KNEE, INITIAL ENCOUNTER: ICD-10-CM

## 2025-07-01 DIAGNOSIS — S83.421A TEAR OF LATERAL COLLATERAL LIGAMENT OF RIGHT KNEE, INITIAL ENCOUNTER: ICD-10-CM

## 2025-07-01 PROCEDURE — 99213 OFFICE O/P EST LOW 20 MIN: CPT | Performed by: ORTHOPAEDIC SURGERY

## 2025-07-01 RX ORDER — IBUPROFEN 800 MG/1
800 TABLET, FILM COATED ORAL
Qty: 90 TABLET | Refills: 0 | Status: SHIPPED | OUTPATIENT
Start: 2025-07-01

## 2025-07-01 NOTE — FLOWSHEET NOTE
[x] [facility]  Outpatient Rehabilitation &  Therapy  [address]  [phone]  [phone]     Therapy Cancel/No Show note    Date: 2025  Patient: Livia Ferrer  : 1995  MRN: [id number]    Cancels/No Shows to date:     For today's appointment patient:    [x]  Cancelled    [] Rescheduled appointment    [] No-show     Reason given by patient:    []  Patient ill    []  Conflicting appointment    [] No transportation      [] Conflict with work    [] No reason given    [] Weather related    [] COVID-19    [x] Other: Patient states she is not going to make it today.   Comments:        [x] Next appointment was confirmed    Electronically signed by: KATIE LUX PTA

## 2025-07-01 NOTE — PROGRESS NOTES
[facility] ORTHOPEDICS AND SPORTS MEDICINE  [address]  [place] [address]     Surgery:  5/16/2025  Right Knee Arthroscopy Acl, Pcl, Lcl, Posterolateral Reconstruction With Allograft (arthrex, Femoral Nerve, Supine, C-arm) - Right    History of Present Illness:    7/1/2025  Patient returns today for follow-up.  She is doing fantastic.  She has no pain.  Her quadricep strength is much improved after 6 weeks of physical therapy.  She wishes to return to work.  Since she has full motion and excellent strength and no pain, I will allow her to return to work without restrictions except that I want her wearing a brace.  She will need to use a brace while weightbearing up until the 9-month kayla.  Follow-up in 6 weeks    06/02/25  This is a 30 y.o. female who presents to the clinic today for post op follow up for Right Knee Arthroscopy Acl, Pcl, Lcl, Posterolateral Reconstruction With Allograft (arthrex, Femoral Nerve, Supine, C-arm) - Right on 5/16/2025 .  Patient returns today for her first postoperative visit after a multiligament knee reconstruction.  The knee itself looks great.  All of her incision incisions have healed nicely.  There is no evidence of swelling or infection.  She is having the normal amount of postoperative pain given the magnitude of her surgery.  At this point I would start physical therapy.  She will need to continue the use of her brace.  We can unlock this from 0 to 90 degrees once her strength returns in the quadriceps.  I will refill her pain medications.  I anticipate she can return to work in 1 month      Physical Exam:  Pain is well-controlled.  she is doing well postoperatively.  The operative extremity has a well-healed incision.  It is clean, dry, and intact.    Swelling is well-controlled.    We discussed the expected postoperative course, including the relevant weightbearing restrictions/immobilization.     1.

## 2025-07-03 ENCOUNTER — HOSPITAL ENCOUNTER (OUTPATIENT)
Age: 30
Setting detail: THERAPIES SERIES
Discharge: HOME | End: 2025-07-03
Attending: ORTHOPAEDIC SURGERY
Payer: MEDICAID

## 2025-07-03 PROCEDURE — 97110 THERAPEUTIC EXERCISES: CPT

## 2025-07-03 PROCEDURE — 97016 VASOPNEUMATIC DEVICE THERAPY: CPT

## 2025-07-03 NOTE — FLOWSHEET NOTE
2x10  Slow, cues to keep knee neutral vs IR x   LAQs 2x10   x   Long sit       Knee ext ovp stretch 1x10 5\" By therapist, towel prop under ankle                         Supine       NMES   Xcite log in  Patient [id number] , PIN [id number]    Xcite set up/muscle testing x      Quad sets    3x10  3\" No estim needed, quad strength fairly good x   Heel slides 10x2  Orange slide tube x   SLR 10x AAROM Able to complete AROM with quad lag x   Gastroc stretch 2x 20 sec Green belt between quad sets with Xcite           Sidelying       Hip abduction 10x   x          Prone       Hip extension 10x      Knee flexion 10x   x          Standing   Brace donned  Bilateral in // bars    Hip abduction 10x2   x   Hip extension 10x2   x   Hip flexion   10x2   x   Heel raises   10x2   x   Gastroc stretch 3x 20 sec  x   Other: 6 weeks post op as of 6/27/25, full protocol in soft chart    Phase 1 (weeks 1-6)  Goals:  Protect graft fixation  Minimize effects of immobilization  Control inflammation  Full extension range of motion  Educate patient on rehabilitation progression  Flexion to 90-degrees  Normalize gait mechanics in pool.  Flexion to 90-degrees only   Therapeutic Exercises:  Immediate leg curls only if non-painful.  Prefer AAROM  Heel slides  Quad sets  Patellar mobilization  Non-weight bearing gastroc/soleus stretching, begin hamstring stretches at 2 weeks  SLR, all planes, with brace in full extension until quadriceps strength is sufficient to prevent extension lag.  Without brace, when has minimal Quad Lag  Quadriceps isometrics at 60-degrees and 90-degrees  Pool immediately once sutures are removed (to work on gait machines)  At 4-weeks post-op add biking, deep well pool running with aqua vest, leg press, quadriceps stretching.  Partial weight bearing closed chain knee extension [id number]  Theraband  Leg press  Pool mini-squats  Prone knee flexion  Gentle hamstring stretching       Treatment Charges: Mins Units Time   []  Modalities

## 2025-07-07 ENCOUNTER — TELEPHONE (OUTPATIENT)
Dept: ORTHOPEDIC SURGERY | Age: 30
End: 2025-07-07

## 2025-07-07 ENCOUNTER — HOSPITAL ENCOUNTER (OUTPATIENT)
Age: 30
Setting detail: THERAPIES SERIES
Discharge: HOME | End: 2025-07-07
Attending: ORTHOPAEDIC SURGERY
Payer: MEDICAID

## 2025-07-07 NOTE — FLOWSHEET NOTE
[x] [facility]  Outpatient Rehabilitation &  Therapy  [address]  [phone]  [phone]     Therapy Cancel/No Show note    Date: 2025  Patient: Livia Ferrer  : 1995  MRN: [id number]    Cancels/No Shows to date: 3/1    For today's appointment patient:    [x]  Cancelled    [] Rescheduled appointment    [] No-show     Reason given by patient:    []  Patient ill    []  Conflicting appointment    [x] No transportation      [] Conflict with work    [] No reason given    [] Weather related    [] COVID-19    [] Other:    Comments:        [x] Next appointment was confirmed    Electronically signed by: Tara Acosta PT

## 2025-07-07 NOTE — TELEPHONE ENCOUNTER
Letter created.     Spoke with patient and informed her of this. She has access to [employer] and had no further questions.

## 2025-07-07 NOTE — TELEPHONE ENCOUNTER
DOS: 5/16/25 RIGHT KNEE ARTHROSCOPY ACL, PCL, LCL, POSTEROLATERAL RECONSTRUCTION WITH ALLOGRAFT   Last OV: 7/1/25     You released patient to go back to work on 7/9/25 full duty no restrictions. Patient called in stating that she spoke with her physical therapist and they don't feel she should go back without restrictions. I didn't see anything stated like this in the PT note, but did see that they feel she would benefit from continued therapy.     Patient states she works 4 10 hours days and doesn't feel like she can be on her feet this long. I asked if she spoke with you about this at her appointment and she said \"no\". She wants to know if you would write her a note to return part time working only 4 5 hour days for the rest of July and returning with no restrictions starting in August.     Please advise if this is OK to create letter. Thank you.

## 2025-07-07 NOTE — TELEPHONE ENCOUNTER
RIGHT KNEE ARTHROSCOPY ACL, PCL, LCL, POSTEROLATERAL RECONSTRUCTION WITH ALLOGRAFT dos 5/16/25. Patient is following up on a notification she says was sent to us on Friday from physical therapy. She states she was told by the physical therapist that she will  need to have restrictions upon her return to work, one of them including only going back part time. Please advise. Her call back number is [phone]. Thank you.

## 2025-07-09 ENCOUNTER — APPOINTMENT (OUTPATIENT)
Age: 30
End: 2025-07-09
Attending: ORTHOPAEDIC SURGERY
Payer: MEDICAID

## 2025-07-10 ENCOUNTER — PATIENT MESSAGE (OUTPATIENT)
Dept: ORTHOPEDIC SURGERY | Age: 30
End: 2025-07-10

## 2025-07-10 ENCOUNTER — HOSPITAL ENCOUNTER (OUTPATIENT)
Age: 30
Setting detail: THERAPIES SERIES
Discharge: HOME | End: 2025-07-10
Attending: ORTHOPAEDIC SURGERY
Payer: MEDICAID

## 2025-07-10 NOTE — FLOWSHEET NOTE
[x] [facility]  Outpatient Rehabilitation &  Therapy  [address]  [phone]  [phone]     Therapy Cancel/No Show note    Date: 7/10/2025  Patient: Livia Ferrer  : 1995  MRN: [id number]    Cancels/No Shows to date: 3/2    For today's appointment patient:    []  Cancelled    [] Rescheduled appointment    [x] No-show     Reason given by patient:    []  Patient ill    []  Conflicting appointment    [] No transportation      [] Conflict with work    [x] No reason given    [] Weather related    [] COVID-19    [] Other:    Comments:        [] Next appointment was confirmed    Electronically signed by: Lorne Madrid PT

## 2025-07-11 ENCOUNTER — APPOINTMENT (OUTPATIENT)
Age: 30
End: 2025-07-11
Attending: ORTHOPAEDIC SURGERY
Payer: MEDICAID

## 2025-07-11 NOTE — TELEPHONE ENCOUNTER
Once signed, I will fax and notify patient. Currently in clinic and he has not signed anything in his folder yet.

## 2025-07-15 ENCOUNTER — HOSPITAL ENCOUNTER (OUTPATIENT)
Age: 30
Setting detail: THERAPIES SERIES
Discharge: HOME | End: 2025-07-15
Attending: ORTHOPAEDIC SURGERY
Payer: MEDICAID

## 2025-07-15 PROCEDURE — 97016 VASOPNEUMATIC DEVICE THERAPY: CPT

## 2025-07-15 PROCEDURE — 97110 THERAPEUTIC EXERCISES: CPT

## 2025-07-15 NOTE — FLOWSHEET NOTE
[x] [facility]  Outpatient Rehabilitation &  Therapy  [address]  [phone]  [phone]     Physical Therapy Daily Treatment Note    Date:  7/15/2025  Patient Name:  Livia Ferrer    :  1995  MRN: [id number]  Physician: Thee Matthews MD                                      Insurance: [facility] MEDICAID AUTH REQUIRED AFTER 30TH VISIT   Medical Diagnosis:   S83.421A (ICD-10-CM) - Tear of lateral collateral ligament of right knee, initial encounter   S83.521A (ICD-10-CM) - Rupture of posterior cruciate ligament of right knee, initial encounter   S83.511A (ICD-10-CM) - Rupture of anterior cruciate ligament of right knee, initial encounter                                Rehab Codes: M 25.571, M 25.561, M 25.661, M 79.661, R 26.89, R 20.1, R 20.2  Onset date: 25 (surgery date)              Next Dr's appt.: 25     Visit# / total visits:     Cancels/No Shows: 3/3    Subjective:    Pain:  [x] Yes  [] No Location: R knee Pain Rating: (0-10 scale) 3/10  Pain altered Tx:  [] No  [x] Yes  Action: Slow progression through therex  Comments: Patient arrives to PT with virgen knee brace and single crutch. Pain today is 3/10.    Objective:  R knee ROM 6/12/25 6/18/25 6/24/25 6/30/25 7/3/25 7/15/25   Knee extension 7A/6P 9 A  7 A 7A 7A 5 A 5A   Knee flexion  78 A 81 A 80A 90A 90 A         Modalities: Vasocompression to right knee supine wedge under legs, 36 degrees, low pressure for 10 minutes after treatment for pain control, swelling  Precautions [] No  [x] Yes:   Exercises: Exercises completed marked with \"x\":  Exercise Reps/ Time Weight/ Level Comments    Nustep 8 mins L1 brace donned x   Stationary bike 5 mins L1            Brace doffed       Seated        Knee ext stretch   2x10 5\" Knee extended, seated on edge x   Heel slide under table  2x10  Slow, cues to keep knee neutral vs IR x   LAQs   2x10   x   Long sit       Knee ext ovp stretch 1x10 5\" By therapist, towel prop under

## 2025-07-17 ENCOUNTER — HOSPITAL ENCOUNTER (OUTPATIENT)
Age: 30
Setting detail: THERAPIES SERIES
Discharge: HOME | End: 2025-07-17
Attending: ORTHOPAEDIC SURGERY
Payer: MEDICAID

## 2025-07-18 ENCOUNTER — APPOINTMENT (OUTPATIENT)
Age: 30
End: 2025-07-18
Attending: ORTHOPAEDIC SURGERY
Payer: MEDICAID

## 2025-08-09 ENCOUNTER — OFFICE VISIT (OUTPATIENT)
Age: 30
End: 2025-08-09

## 2025-08-09 VITALS
TEMPERATURE: 98.3 F | DIASTOLIC BLOOD PRESSURE: 79 MMHG | SYSTOLIC BLOOD PRESSURE: 117 MMHG | HEIGHT: 62 IN | OXYGEN SATURATION: 98 % | WEIGHT: 150 LBS | RESPIRATION RATE: 18 BRPM | HEART RATE: 76 BPM | BODY MASS INDEX: 27.6 KG/M2

## 2025-08-09 DIAGNOSIS — G89.29 CHRONIC PAIN OF RIGHT KNEE: Primary | ICD-10-CM

## 2025-08-09 DIAGNOSIS — M25.561 CHRONIC PAIN OF RIGHT KNEE: Primary | ICD-10-CM

## 2025-08-09 ASSESSMENT — ENCOUNTER SYMPTOMS: SHORTNESS OF BREATH: 0

## 2025-08-11 ENCOUNTER — OFFICE VISIT (OUTPATIENT)
Dept: ORTHOPEDIC SURGERY | Age: 30
End: 2025-08-11

## 2025-08-11 VITALS — HEIGHT: 62 IN | WEIGHT: 150 LBS | BODY MASS INDEX: 27.6 KG/M2

## 2025-08-11 DIAGNOSIS — S83.421A TEAR OF LATERAL COLLATERAL LIGAMENT OF RIGHT KNEE, INITIAL ENCOUNTER: Primary | ICD-10-CM

## 2025-08-11 DIAGNOSIS — S83.521A RUPTURE OF POSTERIOR CRUCIATE LIGAMENT OF RIGHT KNEE, INITIAL ENCOUNTER: ICD-10-CM

## 2025-08-11 DIAGNOSIS — S83.421A TEAR OF LCL (LATERAL COLLATERAL LIGAMENT) OF KNEE, RIGHT, INITIAL ENCOUNTER: ICD-10-CM

## 2025-08-11 DIAGNOSIS — S83.511A RUPTURE OF ANTERIOR CRUCIATE LIGAMENT OF RIGHT KNEE, INITIAL ENCOUNTER: ICD-10-CM

## 2025-08-11 PROCEDURE — 99024 POSTOP FOLLOW-UP VISIT: CPT | Performed by: ORTHOPAEDIC SURGERY

## 2025-08-12 ENCOUNTER — TELEPHONE (OUTPATIENT)
Dept: ORTHOPEDIC SURGERY | Age: 30
End: 2025-08-12

## 2025-09-05 DIAGNOSIS — Z00.00 ROUTINE ADULT HEALTH MAINTENANCE: ICD-10-CM

## 2025-09-05 DIAGNOSIS — K21.9 GASTROESOPHAGEAL REFLUX DISEASE WITHOUT ESOPHAGITIS: ICD-10-CM

## 2025-09-05 RX ORDER — VITAMIN A, VITAMIN C, VITAMIN D-3, VITAMIN E, VITAMIN B-1, VITAMIN B-2, NIACIN, VITAMIN B-6, CALCIUM, IRON, ZINC, COPPER 4000; 120; 400; 22; 1.84; 3; 20; 10; 1; 12; 200; 27; 25; 2 [IU]/1; MG/1; [IU]/1; MG/1; MG/1; MG/1; MG/1; MG/1; MG/1; UG/1; MG/1; MG/1; MG/1; MG/1
1 TABLET ORAL DAILY
Qty: 30 TABLET | Refills: 5 | Status: SHIPPED | OUTPATIENT
Start: 2025-09-05

## 2025-09-05 RX ORDER — OMEPRAZOLE 20 MG/1
CAPSULE, DELAYED RELEASE ORAL
Qty: 90 CAPSULE | Refills: 1 | Status: SHIPPED | OUTPATIENT
Start: 2025-09-05

## 2025-09-06 ENCOUNTER — OFFICE VISIT (OUTPATIENT)
Age: 30
End: 2025-09-06

## 2025-09-06 VITALS
DIASTOLIC BLOOD PRESSURE: 85 MMHG | OXYGEN SATURATION: 98 % | HEIGHT: 62 IN | TEMPERATURE: 97.9 F | SYSTOLIC BLOOD PRESSURE: 121 MMHG | RESPIRATION RATE: 18 BRPM | HEART RATE: 73 BPM | WEIGHT: 149 LBS | BODY MASS INDEX: 27.42 KG/M2

## 2025-09-06 DIAGNOSIS — G89.29 CHRONIC PAIN OF RIGHT KNEE: Primary | ICD-10-CM

## 2025-09-06 DIAGNOSIS — M25.561 CHRONIC PAIN OF RIGHT KNEE: Primary | ICD-10-CM

## 2025-09-06 RX ORDER — KETOROLAC TROMETHAMINE 30 MG/ML
30 INJECTION, SOLUTION INTRAMUSCULAR; INTRAVENOUS ONCE
Status: COMPLETED | OUTPATIENT
Start: 2025-09-06 | End: 2025-09-06

## 2025-09-06 RX ADMIN — KETOROLAC TROMETHAMINE 30 MG: 30 INJECTION, SOLUTION INTRAMUSCULAR; INTRAVENOUS at 09:52

## (undated) DEVICE — [AGGRESSIVE PLUS CUTTER, ARTHROSCOPIC SHAVER BLADE,  DO NOT RESTERILIZE,  DO NOT USE IF PACKAGE IS DAMAGED,  KEEP DRY,  KEEP AWAY FROM SUNLIGHT]: Brand: FORMULA

## (undated) DEVICE — DISC SUCT FLR DLX QUICKSUITE

## (undated) DEVICE — DRAPE C ARM W/ POLY STRP W42XL72IN FOR MOB XR

## (undated) DEVICE — CANN 5.5 WO/HOLES LTX FREE ORANGE

## (undated) DEVICE — STRIP,CLOSURE,WOUND,MEDI-STRIP,1/2X4: Brand: MEDLINE

## (undated) DEVICE — PAD,ABDOMINAL,5"X9",ST,LF,25/BX: Brand: MEDLINE INDUSTRIES, INC.

## (undated) DEVICE — GLOVE SURG SZ 8 L12IN FNGR THK79MIL GRN LTX FREE

## (undated) DEVICE — KIT INSTR TRANSTIBIAL CRUCE W/O SAW BLDE DISP

## (undated) DEVICE — DRILL SURG FLIPCUTTER III

## (undated) DEVICE — NEPTUNE E-SEP SMOKE EVACUATION PENCIL, COATED, 70MM BLADE, PUSH BUTTON SWITCH: Brand: NEPTUNE E-SEP

## (undated) DEVICE — SUTURE N ABSRB 2 26 IN 2 IN CLOSED LOOP BLK WHT FIBERSNARE

## (undated) DEVICE — 4-PORT MANIFOLD: Brand: NEPTUNE 2

## (undated) DEVICE — SUTURE TIGERLOOP SZ 2-0 L20IN NONABSORBABLE WHT GRN BRAID AR7234T

## (undated) DEVICE — SUTURE VICRYL + SZ 2-0 L36IN ABSRB UD L36MM CT-1 1/2 CIR VCP945H

## (undated) DEVICE — HYPODERMIC SAFETY NEEDLE: Brand: MAGELLAN

## (undated) DEVICE — SUTURE FIBERSNARE 2 L26IN NONABSORBABLE GRN L12IN FIBERWIRE AR7209SN

## (undated) DEVICE — TUBING PMP L16FT MAIN DISP FOR AR-6400 AR-6475 Â€“ ORDER MULTIPLES OF 10 EACH

## (undated) DEVICE — C-ARMOR C-ARM EQUIPMENT COVERS CLEAR STERILE UNIVERSAL FIT 12 PER CASE: Brand: C-ARMOR

## (undated) DEVICE — SOLUTION IV 500ML 0.9% SOD BOTTLE CHL LTWT DURABLE SHATTERPROOF

## (undated) DEVICE — SYRINGE IRRIG 60ML SFT PLIABLE BLB EZ TO GRP 1 HND USE W/

## (undated) DEVICE — ELECTRODE PT RET AD L9FT HI MOIST COND ADH HYDRGEL CORDED

## (undated) DEVICE — GOWN,SIRUS,NONRNF,SETINSLV,XL,20/CS: Brand: MEDLINE

## (undated) DEVICE — BLADE,CARBON-STEEL,15,STRL,DISPOSABLE,TB: Brand: MEDLINE

## (undated) DEVICE — Device

## (undated) DEVICE — SUTURE FIBERLOOP SZ 2-0 L20IN NONABSORBABLE BLU STR NDL AR7234

## (undated) DEVICE — BANDAGE COMPR W6INXL10YD ST M E WHITE/BEIGE

## (undated) DEVICE — SOL IRR SOD CHL 0.9% TITAN XL CNTNR 3000ML

## (undated) DEVICE — COVER,TABLE,HEAVY DUTY,50"X90",STRL: Brand: MEDLINE

## (undated) DEVICE — YANKAUER,FLEXIBLE HANDLE,REGLR CAPACITY: Brand: MEDLINE INDUSTRIES, INC.

## (undated) DEVICE — AMBIENT SUPER TURBOVAC 90: Brand: COBLATION

## (undated) DEVICE — SPONGE LAP W18XL18IN WHT COT 4 PLY FLD STRUNG RADPQ DISP ST 2 PER PACK

## (undated) DEVICE — MARKER,SKIN,WI/RULER AND LABELS: Brand: MEDLINE

## (undated) DEVICE — GLOVE SURG SZ 75 CRM LTX FREE POLYISOPRENE POLYMER BEAD ANTI

## (undated) DEVICE — SUTURE MONOCRYL SZ 4-0 L27IN ABSRB UD L19MM PS-2 1/2 CIR PRIM Y426H

## (undated) DEVICE — BRACE ORTH HINGED POST OPERATIVE DONJOY XACT ROM LT

## (undated) DEVICE — PAD COOL W10.9XL11.3IN UNIV REG HOSE WRP ON THER CLD

## (undated) DEVICE — DRESSING,GAUZE,XEROFORM,CURAD,1"X8",ST: Brand: CURAD